# Patient Record
Sex: FEMALE | Race: ASIAN | NOT HISPANIC OR LATINO
[De-identification: names, ages, dates, MRNs, and addresses within clinical notes are randomized per-mention and may not be internally consistent; named-entity substitution may affect disease eponyms.]

---

## 2020-01-03 ENCOUNTER — LABORATORY RESULT (OUTPATIENT)
Age: 65
End: 2020-01-03

## 2020-01-03 ENCOUNTER — APPOINTMENT (OUTPATIENT)
Dept: INTERNAL MEDICINE | Facility: CLINIC | Age: 65
End: 2020-01-03
Payer: MEDICAID

## 2020-01-03 VITALS
DIASTOLIC BLOOD PRESSURE: 60 MMHG | TEMPERATURE: 97.5 F | SYSTOLIC BLOOD PRESSURE: 110 MMHG | WEIGHT: 119 LBS | HEIGHT: 57 IN | BODY MASS INDEX: 25.67 KG/M2

## 2020-01-03 DIAGNOSIS — Z82.49 FAMILY HISTORY OF ISCHEMIC HEART DISEASE AND OTHER DISEASES OF THE CIRCULATORY SYSTEM: ICD-10-CM

## 2020-01-03 DIAGNOSIS — Z83.3 FAMILY HISTORY OF DIABETES MELLITUS: ICD-10-CM

## 2020-01-03 DIAGNOSIS — Z78.9 OTHER SPECIFIED HEALTH STATUS: ICD-10-CM

## 2020-01-03 PROCEDURE — 93000 ELECTROCARDIOGRAM COMPLETE: CPT

## 2020-01-03 PROCEDURE — 36415 COLL VENOUS BLD VENIPUNCTURE: CPT

## 2020-01-03 PROCEDURE — 99386 PREV VISIT NEW AGE 40-64: CPT | Mod: 25

## 2020-01-04 ENCOUNTER — RESULT REVIEW (OUTPATIENT)
Age: 65
End: 2020-01-04

## 2020-01-04 LAB
ALBUMIN SERPL ELPH-MCNC: 4.3 G/DL
ALP BLD-CCNC: 88 U/L
ALT SERPL-CCNC: 15 U/L
ANION GAP SERPL CALC-SCNC: 13 MMOL/L
APPEARANCE: CLEAR
AST SERPL-CCNC: 15 U/L
BASOPHILS # BLD AUTO: 0.03 K/UL
BASOPHILS NFR BLD AUTO: 0.7 %
BILIRUB SERPL-MCNC: 0.2 MG/DL
BILIRUBIN URINE: NEGATIVE
BLOOD URINE: NEGATIVE
BUN SERPL-MCNC: 10 MG/DL
CALCIUM SERPL-MCNC: 10.8 MG/DL
CHLORIDE SERPL-SCNC: 101 MMOL/L
CHOLEST SERPL-MCNC: 160 MG/DL
CHOLEST/HDLC SERPL: 4 RATIO
CO2 SERPL-SCNC: 25 MMOL/L
COLOR: YELLOW
CREAT SERPL-MCNC: 0.58 MG/DL
EOSINOPHIL # BLD AUTO: 0.09 K/UL
EOSINOPHIL NFR BLD AUTO: 2 %
ESTIMATED AVERAGE GLUCOSE: 263 MG/DL
GLUCOSE QUALITATIVE U: NEGATIVE
GLUCOSE SERPL-MCNC: 209 MG/DL
HBA1C MFR BLD HPLC: 10.8 %
HCT VFR BLD CALC: 43.6 %
HDLC SERPL-MCNC: 40 MG/DL
HGB BLD-MCNC: 13.6 G/DL
IMM GRANULOCYTES NFR BLD AUTO: 0.2 %
KETONES URINE: NEGATIVE
LDLC SERPL CALC-MCNC: 104 MG/DL
LEUKOCYTE ESTERASE URINE: ABNORMAL
LYMPHOCYTES # BLD AUTO: 1.63 K/UL
LYMPHOCYTES NFR BLD AUTO: 35.4 %
MAN DIFF?: NORMAL
MCHC RBC-ENTMCNC: 26.6 PG
MCHC RBC-ENTMCNC: 31.2 GM/DL
MCV RBC AUTO: 85.3 FL
MONOCYTES # BLD AUTO: 0.37 K/UL
MONOCYTES NFR BLD AUTO: 8 %
NEUTROPHILS # BLD AUTO: 2.48 K/UL
NEUTROPHILS NFR BLD AUTO: 53.7 %
NITRITE URINE: NEGATIVE
PH URINE: 6
PLATELET # BLD AUTO: 282 K/UL
POTASSIUM SERPL-SCNC: 4.2 MMOL/L
PROT SERPL-MCNC: 6.9 G/DL
PROTEIN URINE: NEGATIVE
RBC # BLD: 5.11 M/UL
RBC # FLD: 12.3 %
SAVE SPECIMEN: NORMAL
SODIUM SERPL-SCNC: 139 MMOL/L
SPECIFIC GRAVITY URINE: 1.02
TRIGL SERPL-MCNC: 80 MG/DL
TROPONIN I SERPL-MCNC: <0.01 NG/ML
TSH SERPL-ACNC: 1.45 UIU/ML
UROBILINOGEN URINE: NORMAL
WBC # FLD AUTO: 4.61 K/UL

## 2020-01-06 LAB
CALCIUM SERPL-MCNC: 10.6 MG/DL
PARATHYROID HORMONE INTACT: 60 PG/ML

## 2020-01-10 ENCOUNTER — APPOINTMENT (OUTPATIENT)
Dept: INTERNAL MEDICINE | Facility: CLINIC | Age: 65
End: 2020-01-10
Payer: MEDICAID

## 2020-01-10 ENCOUNTER — INPATIENT (INPATIENT)
Facility: HOSPITAL | Age: 65
LOS: 2 days | Discharge: HOME | DRG: 311 | End: 2020-01-13
Attending: HOSPITALIST | Admitting: STUDENT IN AN ORGANIZED HEALTH CARE EDUCATION/TRAINING PROGRAM
Payer: MEDICAID

## 2020-01-10 VITALS
WEIGHT: 121 LBS | TEMPERATURE: 97 F | HEIGHT: 57 IN | SYSTOLIC BLOOD PRESSURE: 118 MMHG | HEART RATE: 60 BPM | DIASTOLIC BLOOD PRESSURE: 70 MMHG | BODY MASS INDEX: 26.1 KG/M2 | OXYGEN SATURATION: 98 %

## 2020-01-10 VITALS
SYSTOLIC BLOOD PRESSURE: 163 MMHG | DIASTOLIC BLOOD PRESSURE: 87 MMHG | WEIGHT: 134.92 LBS | TEMPERATURE: 98 F | RESPIRATION RATE: 20 BRPM | OXYGEN SATURATION: 98 % | HEART RATE: 67 BPM

## 2020-01-10 DIAGNOSIS — R87.619 UNSPECIFIED ABNORMAL CYTOLOGICAL FINDINGS IN SPECIMENS FROM CERVIX UTERI: ICD-10-CM

## 2020-01-10 DIAGNOSIS — I20.9 ANGINA PECTORIS, UNSPECIFIED: ICD-10-CM

## 2020-01-10 LAB
ALBUMIN SERPL ELPH-MCNC: 4.4 G/DL — SIGNIFICANT CHANGE UP (ref 3.3–5)
ALP SERPL-CCNC: 97 U/L — SIGNIFICANT CHANGE UP (ref 40–120)
ALT FLD-CCNC: 19 U/L — SIGNIFICANT CHANGE UP (ref 10–45)
ANION GAP SERPL CALC-SCNC: 13 MMOL/L — SIGNIFICANT CHANGE UP (ref 5–17)
APTT BLD: 27.5 SEC — SIGNIFICANT CHANGE UP (ref 27.5–36.3)
AST SERPL-CCNC: 14 U/L — SIGNIFICANT CHANGE UP (ref 10–40)
BASE EXCESS BLDV CALC-SCNC: 3 MMOL/L — HIGH (ref -2–2)
BASOPHILS # BLD AUTO: 0.03 K/UL — SIGNIFICANT CHANGE UP (ref 0–0.2)
BASOPHILS NFR BLD AUTO: 0.4 % — SIGNIFICANT CHANGE UP (ref 0–2)
BILIRUB SERPL-MCNC: 0.2 MG/DL — SIGNIFICANT CHANGE UP (ref 0.2–1.2)
BUN SERPL-MCNC: 12 MG/DL — SIGNIFICANT CHANGE UP (ref 7–23)
CA-I SERPL-SCNC: 1.31 MMOL/L — HIGH (ref 1.12–1.3)
CALCIUM SERPL-MCNC: 10.6 MG/DL — HIGH (ref 8.4–10.5)
CHLORIDE BLDV-SCNC: 103 MMOL/L — SIGNIFICANT CHANGE UP (ref 96–108)
CHLORIDE SERPL-SCNC: 97 MMOL/L — SIGNIFICANT CHANGE UP (ref 96–108)
CO2 BLDV-SCNC: 30 MMOL/L — SIGNIFICANT CHANGE UP (ref 22–30)
CO2 SERPL-SCNC: 25 MMOL/L — SIGNIFICANT CHANGE UP (ref 22–31)
CREAT SERPL-MCNC: 0.58 MG/DL — SIGNIFICANT CHANGE UP (ref 0.5–1.3)
EOSINOPHIL # BLD AUTO: 0.08 K/UL — SIGNIFICANT CHANGE UP (ref 0–0.5)
EOSINOPHIL NFR BLD AUTO: 1.1 % — SIGNIFICANT CHANGE UP (ref 0–6)
GAS PNL BLDV: 133 MMOL/L — LOW (ref 135–145)
GAS PNL BLDV: SIGNIFICANT CHANGE UP
GLUCOSE BLDV-MCNC: 267 MG/DL — HIGH (ref 70–99)
GLUCOSE SERPL-MCNC: 278 MG/DL — HIGH (ref 70–99)
HCO3 BLDV-SCNC: 28 MMOL/L — SIGNIFICANT CHANGE UP (ref 21–29)
HCT VFR BLD CALC: 42.7 % — SIGNIFICANT CHANGE UP (ref 34.5–45)
HCT VFR BLDA CALC: 43 % — SIGNIFICANT CHANGE UP (ref 39–50)
HGB BLD CALC-MCNC: 14.1 G/DL — SIGNIFICANT CHANGE UP (ref 11.5–15.5)
HGB BLD-MCNC: 13.7 G/DL — SIGNIFICANT CHANGE UP (ref 11.5–15.5)
IMM GRANULOCYTES NFR BLD AUTO: 0.3 % — SIGNIFICANT CHANGE UP (ref 0–1.5)
INR BLD: 0.89 RATIO — SIGNIFICANT CHANGE UP (ref 0.88–1.16)
LACTATE BLDV-MCNC: 1.7 MMOL/L — SIGNIFICANT CHANGE UP (ref 0.7–2)
LYMPHOCYTES # BLD AUTO: 2.63 K/UL — SIGNIFICANT CHANGE UP (ref 1–3.3)
LYMPHOCYTES # BLD AUTO: 34.9 % — SIGNIFICANT CHANGE UP (ref 13–44)
MAGNESIUM SERPL-MCNC: 2.2 MG/DL — SIGNIFICANT CHANGE UP (ref 1.6–2.6)
MCHC RBC-ENTMCNC: 26.8 PG — LOW (ref 27–34)
MCHC RBC-ENTMCNC: 32.1 GM/DL — SIGNIFICANT CHANGE UP (ref 32–36)
MCV RBC AUTO: 83.6 FL — SIGNIFICANT CHANGE UP (ref 80–100)
MONOCYTES # BLD AUTO: 0.49 K/UL — SIGNIFICANT CHANGE UP (ref 0–0.9)
MONOCYTES NFR BLD AUTO: 6.5 % — SIGNIFICANT CHANGE UP (ref 2–14)
NEUTROPHILS # BLD AUTO: 4.29 K/UL — SIGNIFICANT CHANGE UP (ref 1.8–7.4)
NEUTROPHILS NFR BLD AUTO: 56.8 % — SIGNIFICANT CHANGE UP (ref 43–77)
NRBC # BLD: 0 /100 WBCS — SIGNIFICANT CHANGE UP (ref 0–0)
NT-PROBNP SERPL-SCNC: 17 PG/ML — SIGNIFICANT CHANGE UP (ref 0–300)
PCO2 BLDV: 48 MMHG — SIGNIFICANT CHANGE UP (ref 35–50)
PH BLDV: 7.38 — SIGNIFICANT CHANGE UP (ref 7.35–7.45)
PLATELET # BLD AUTO: 304 K/UL — SIGNIFICANT CHANGE UP (ref 150–400)
PO2 BLDV: 29 MMHG — SIGNIFICANT CHANGE UP (ref 25–45)
POTASSIUM BLDV-SCNC: 3.6 MMOL/L — SIGNIFICANT CHANGE UP (ref 3.5–5.3)
POTASSIUM SERPL-MCNC: 3.9 MMOL/L — SIGNIFICANT CHANGE UP (ref 3.5–5.3)
POTASSIUM SERPL-SCNC: 3.9 MMOL/L — SIGNIFICANT CHANGE UP (ref 3.5–5.3)
PROT SERPL-MCNC: 7.2 G/DL — SIGNIFICANT CHANGE UP (ref 6–8.3)
PROTHROM AB SERPL-ACNC: 10.2 SEC — SIGNIFICANT CHANGE UP (ref 10–12.9)
RBC # BLD: 5.11 M/UL — SIGNIFICANT CHANGE UP (ref 3.8–5.2)
RBC # FLD: 12.3 % — SIGNIFICANT CHANGE UP (ref 10.3–14.5)
SAO2 % BLDV: 50 % — LOW (ref 67–88)
SODIUM SERPL-SCNC: 135 MMOL/L — SIGNIFICANT CHANGE UP (ref 135–145)
TROPONIN T, HIGH SENSITIVITY RESULT: <6 NG/L — SIGNIFICANT CHANGE UP (ref 0–51)
WBC # BLD: 7.54 K/UL — SIGNIFICANT CHANGE UP (ref 3.8–10.5)
WBC # FLD AUTO: 7.54 K/UL — SIGNIFICANT CHANGE UP (ref 3.8–10.5)

## 2020-01-10 PROCEDURE — 99285 EMERGENCY DEPT VISIT HI MDM: CPT

## 2020-01-10 PROCEDURE — 93010 ELECTROCARDIOGRAM REPORT: CPT

## 2020-01-10 PROCEDURE — 99214 OFFICE O/P EST MOD 30 MIN: CPT | Mod: 25

## 2020-01-10 PROCEDURE — 99223 1ST HOSP IP/OBS HIGH 75: CPT

## 2020-01-10 PROCEDURE — 71046 X-RAY EXAM CHEST 2 VIEWS: CPT | Mod: 26

## 2020-01-10 RX ORDER — SODIUM CHLORIDE 9 MG/ML
1000 INJECTION INTRAMUSCULAR; INTRAVENOUS; SUBCUTANEOUS ONCE
Refills: 0 | Status: COMPLETED | OUTPATIENT
Start: 2020-01-10 | End: 2020-01-10

## 2020-01-10 RX ORDER — ASPIRIN/CALCIUM CARB/MAGNESIUM 324 MG
243 TABLET ORAL ONCE
Refills: 0 | Status: COMPLETED | OUTPATIENT
Start: 2020-01-10 | End: 2020-01-10

## 2020-01-10 RX ADMIN — Medication 243 MILLIGRAM(S): at 22:52

## 2020-01-10 RX ADMIN — SODIUM CHLORIDE 1000 MILLILITER(S): 9 INJECTION INTRAMUSCULAR; INTRAVENOUS; SUBCUTANEOUS at 22:40

## 2020-01-10 NOTE — H&P ADULT - PROBLEM SELECTOR PLAN 2
check A1c  home regimen insulin 70/30 40u in AM (last received this morning)  will start patient on 15u lantus and 5u TIDAC for now   needs endocrinology appointment

## 2020-01-10 NOTE — H&P ADULT - ATTENDING COMMENTS
Patient assigned to me by night hospitalist in charge for management and care for patient for this evening only. Care to be resumed by day hospitalist in the morning and thereafter.     Patient's care rendered on 1/10/202 at 11:40PM.      I was physically present for the key portions of the evaluation and management (E/M) service provided.  I agree with the above history, physical, and plan which I have reviewed and edited where appropriate.     Plan discussed with Patient, RN, daughter.

## 2020-01-10 NOTE — ED ADULT NURSE NOTE - OBJECTIVE STATEMENT
65 y/o female PMH diabetes and HTN presents to ED reporting SOB and CP. Per family member pt has had SOB and CP for the last few days. On exam, AOx3, speaking in complete sentences. Lung sounds CTA, NAD, O2 sat 98%. Pt denies n/v/d, fever/chills at this time. CM in place sinus bradycardia HR 57. EKG completed and given to attending MD. Heplock placed, labs sent. Awaiting evaluation by MD.

## 2020-01-10 NOTE — H&P ADULT - NSHPREVIEWOFSYSTEMS_GEN_ALL_CORE
CONSTITUTIONAL: No weakness, fevers or chills  EYES/ENT: No visual changes;  No dysphagia  NECK: No pain or stiffness  RESPIRATORY: No cough, wheezing, hemoptysis; dyspnea on exertion   CARDIOVASCULAR: No chest pain or palpitations; No lower extremity edema  EXTREMITIES: no le edema, cyanosis, clubbing  MUSCULOSKELETAL: right knee pain (hx of arthritis)  GASTROINTESTINAL: No abdominal or epigastric pain. No nausea, vomiting, or hematemesis; No diarrhea or constipation. No melena or hematochezia.  BACK: No back pain  GENITOURINARY: No dysuria, frequency or hematuria  NEUROLOGICAL: No numbness or weakness  SKIN: No itching, burning, rashes, or lesions   PSYCH: no agitation  All other review of systems is negative unless indicated above.

## 2020-01-10 NOTE — ED PROVIDER NOTE - NS ED ROS FT
Gen: No fever, normal appetite  Eyes: No eye irritation or discharge  ENT: No earpain, congestion, sore throat  Resp: +SOB  Cardiovascular: +CP  Gastroenteric: No nausea/vomiting, diarrhea, constipation  : No dysuria  MS: No joint or muscle pain  Skin: No rashes  Neuro: No headache  Remainder negative, except as per the HPI

## 2020-01-10 NOTE — H&P ADULT - NSICDXPASTMEDICALHX_GEN_ALL_CORE_FT
PAST MEDICAL HISTORY:  Arthritis     CAD (coronary artery disease)     Cataract     Diabetes mellitus     HLD (hyperlipidemia)     HTN (hypertension)

## 2020-01-10 NOTE — ED PROVIDER NOTE - OBJECTIVE STATEMENT
65yo F h/o uncontrolled DM, HTN, HLD p/w chest pressure x 2 wks, worse with exertion. also complaining of sob worse with exertion x 2 wks. aspirin helps with the pain. denies f/c URI symptoms. reports a heart problem, but does not know exactly what the heart problem is. travel from [facility] last year. does not take any hormonal therapy. no calf swelling, no family history or h/o dvt.

## 2020-01-10 NOTE — ED ADULT NURSE NOTE - NSIMPLEMENTINTERV_GEN_ALL_ED
Implemented All Universal Safety Interventions:  [place] to call system. Call bell, personal items and telephone within reach. Instruct patient to call for assistance. Room bathroom lighting operational. Non-slip footwear when patient is off stretcher. Physically safe environment: no spills, clutter or unnecessary equipment. Stretcher in lowest position, wheels locked, appropriate side rails in place.

## 2020-01-10 NOTE — ED PROVIDER NOTE - RAPID ASSESSMENT
pt seen as Rapid Access in waiting room prior to ED team evaluation -- pt w/ daughter (easily translating) c/o 2 weeks of gradually increasing SOB, dyspnea on exertion, fatigue, poorly controlled DM with increased thirst/urination, feels intermittent off balance/vertigo at times, no current chest pain/discomfort or sob/dyspnea, no recent fevers.  [place] Medications -->  Mixtard 30 mg, Nebicard 2.5 mg, Ecosprin 75mg, Prosan HZ 50 mg, Avaton 10 mg   - Jasmeet Arboleda MD

## 2020-01-10 NOTE — H&P ADULT - NSHPPHYSICALEXAM_GEN_ALL_CORE
PHYSICAL EXAM:  Vital Signs Last 24 Hrs  T(C): 36.7 (01-11-20 @ 01:40)  T(F): 98.1 (01-11-20 @ 01:40), Max: 98.2 (01-10-20 @ 19:34)  HR: 58 (01-11-20 @ 01:40) (57 - 67)  BP: 103/56 (01-11-20 @ 01:40)  BP(mean): 67 (01-11-20 @ 01:40) (67 - 67)  RR: 16 (01-11-20 @ 01:40) (16 - 20)  SpO2: 100% (01-11-20 @ 01:40) (98% - 100%)  Wt(kg): --    Constitutional: NAD, awake and alert  EYES: EOMI  ENT:  Normal Hearing, no tonsillar exudates   Neck: Soft and supple, No JVD  Lungs: Breath sounds are clear bilaterally, No wheezing, rales or rhonchi  Heart: S1 and S2, regular rate and rhythm, no Murmurs, gallops or rubs  Abdomen: Bowel Sounds present, soft, nontender, nondistended, no guarding, no rebound  Extremities: No cyanosis or clubbing; warm to touch  Vascular: 2+ peripheral pulses lower ex  Neurological: A/O x 3, no focal deficits  Musculoskeletal: 5/5 strength b/l upper and lower extremities  Skin: No rashes  Psych: no depression or anhedonia  HEME: no bruises, no nose bleeds

## 2020-01-10 NOTE — ED PROVIDER NOTE - PROGRESS NOTE DETAILS
Irasema Boston MD: pt with s/s consistent with angina. HEART 5. will admit for further w/u. cardiology consulted Irasema Boston MD: pt with s/s consistent with angina. HEART 5. will admit for further w/u. hosp requesting cards c/s Irasema Boston MD: attempted to contact unattached cardiologist Dr Awan - did not answer and mailbox full

## 2020-01-10 NOTE — H&P ADULT - PROBLEM SELECTOR PLAN 1
Patient with dyspnea on exertion likely 2/2 CHF vs. valvular disease, no fever, no leukocytosis, no cough to suggest pneumonia; no recent travel, unlikely to be PE  never had chest pain, unlikely to be ACS; hstrop <6  check echocardiogram  currently does not appear to be fluid overloaded  monitor on telemetry

## 2020-01-10 NOTE — ED PROVIDER NOTE - CLINICAL SUMMARY MEDICAL DECISION MAKING FREE TEXT BOX
Estefany: high risk diabetic with previous heart problems sent for chest pain and elevated glucose, no primary care doctor in this country.  Will check labs and could need admission due to high risk. will get labs and discuss.

## 2020-01-10 NOTE — H&P ADULT - HISTORY OF PRESENT ILLNESS
63 yo female with PMH of HTN, HLD, ?CAD, DM, cataract, arthritis, presents here with SOB.  Patient states that for last few months she has been having SOB with exertion, which is progressively getting worse.  She now gets SOB with walking up one flight of stairs, though she can walk on level ground without any issues.  She also has trouble lying flat due to SOB, but denies PND, LE swelling or weight gain.  She denies any chest pain or palpitation.  Denies any dizziness or lightheadedness.  [place] came to [place] ago from [facility].  She never had any chest pain in past, never had stress test or angiogram done.  She had echo in the past and was told everything was OK.  She came from [facility] one year ago and has not been able to see any doctor, also has been taking medication that she brought from her country,. 63 yo female with PMH of HTN, HLD, ?CAD, DM, cataract, arthritis, presents here with SOB.  Patient states that for last few months she has been having SOB with exertion, which is progressively getting worse.  She now gets SOB with walking up one flight of stairs, though she can walk on level ground without any issues.  She also has trouble lying flat due to SOB, but denies PND, LE swelling or weight gain.  She denies any chest pain or palpitation.  Denies any dizziness or lightheadedness.  She came to [place] ago from [facility].  She never had any chest pain in past, never had stress test or angiogram done.  She had echo in the past and was told everything was OK.  She came from [facility] one year ago and has not been able to see any doctor, also has been taking medication that she brought from her country,.      Home medication:  Mixtard 30 (insulin 70/30) 40u qAM  Nebicard (Nebivolol) 2.5mg daily  Ecosprin (aspirin) 75mg daily  Prosan Hz (losartan/HCTZ) 50mg daily  Avator 10mg bedtime

## 2020-01-10 NOTE — H&P ADULT - PROBLEM SELECTOR PLAN 7
lovenox 1.  Name of PCP: No PMD  2.  PCP Contacted on Admission: [ ] Y    [ ] N    3.  PCP contacted at Discharge: [ ] Y    [ ] N    [ ] N/A  4.  Post-Discharge Appointment Date and Location:  5.  Summary of Handoff given to PCP:

## 2020-01-10 NOTE — H&P ADULT - NSHPLABSRESULTS_GEN_ALL_CORE
Labs personally reviewed:                          13.7   7.54  )-----------( 304      ( 10 Jason 2020 22:14 )             42.7     01-10    135  |  97  |  12  ----------------------------<  278<H>  3.9   |  25  |  0.58    Ca    10.6<H>      10 Jason 2020 22:14  Mg     2.2     10    TPro  7.2  /  Alb  4.4  /  TBili  0.2  /  DBili  x   /  AST  14  /  ALT  19  /  AlkPhos  97  01-10        LIVER FUNCTIONS - ( 10 Jason 2020 22:14 )  Alb: 4.4 g/dL / Pro: 7.2 g/dL / ALK PHOS: 97 U/L / ALT: 19 U/L / AST: 14 U/L / GGT: x           PT/INR - ( 10 Jason 2020 22:14 )   PT: 10.2 sec;   INR: 0.89 ratio         PTT - ( 10 Jason 2020 22:14 )  PTT:27.5 sec  Urinalysis Basic - ( 2020 00:30 )    Color: Colorless / Appearance: Clear / S.004 / pH: x  Gluc: x / Ketone: Negative  / Bili: Negative / Urobili: Negative   Blood: x / Protein: Negative / Nitrite: Negative   Leuk Esterase: Large / RBC: 0 /hpf / WBC 51 /HPF   Sq Epi: x / Non Sq Epi: 1 /hpf / Bacteria: Negative      CAPILLARY BLOOD GLUCOSE      POCT Blood Glucose.: 132 mg/dL (2020 00:38)  POCT Blood Glucose.: 325 mg/dL (10 Jason 2020 19:40)      Imaging:  CXR personally reviewed: no focal opacity    EKG personally reviewed: incomplete RBBB, TWI III, AVF v3-v6

## 2020-01-10 NOTE — ED PROVIDER NOTE - PHYSICAL EXAMINATION
Vitals: HTN remainder wnl  Gen: laying comfortably in NAD  Head: NCAT  ENT: sclerae white, anicterus, moist mucous membranes. No exudates.   CV: RRR. Audible S1 and S2. No murmurs, rubs, gallops, S3, nor S4, 2+ radial and DP pulses   Pulm: Clear to auscultation bilaterally. No wheezes, rales, or rhonchi  Abd: soft, normoactive BS x4, NTND, no rebound, no guarding, no rashes  Musculoskeletal:  No peripheral edema  Skin: no lesions or scars noted  Neurologic: AAOx3  : no CVA tenderness  Psych: normal affect

## 2020-01-10 NOTE — ED ADULT NURSE REASSESSMENT NOTE - NS ED NURSE REASSESS COMMENT FT1
Pt admitted to medicine, telemetry diagnosis of chest pain. CM in place NSR HR 60s. Awaiting bed placement at this time. Pt provided food per MD Boston.

## 2020-01-11 DIAGNOSIS — R06.02 SHORTNESS OF BREATH: ICD-10-CM

## 2020-01-11 DIAGNOSIS — Z29.9 ENCOUNTER FOR PROPHYLACTIC MEASURES, UNSPECIFIED: ICD-10-CM

## 2020-01-11 DIAGNOSIS — Z90.49 ACQUIRED ABSENCE OF OTHER SPECIFIED PARTS OF DIGESTIVE TRACT: Chronic | ICD-10-CM

## 2020-01-11 DIAGNOSIS — Z90.710 ACQUIRED ABSENCE OF BOTH CERVIX AND UTERUS: Chronic | ICD-10-CM

## 2020-01-11 DIAGNOSIS — I25.10 ATHEROSCLEROTIC HEART DISEASE OF NATIVE CORONARY ARTERY WITHOUT ANGINA PECTORIS: ICD-10-CM

## 2020-01-11 DIAGNOSIS — R00.2 PALPITATIONS: ICD-10-CM

## 2020-01-11 DIAGNOSIS — E11.65 TYPE 2 DIABETES MELLITUS WITH HYPERGLYCEMIA: ICD-10-CM

## 2020-01-11 DIAGNOSIS — E78.5 HYPERLIPIDEMIA, UNSPECIFIED: ICD-10-CM

## 2020-01-11 DIAGNOSIS — Z02.9 ENCOUNTER FOR ADMINISTRATIVE EXAMINATIONS, UNSPECIFIED: ICD-10-CM

## 2020-01-11 DIAGNOSIS — R82.90 UNSPECIFIED ABNORMAL FINDINGS IN URINE: ICD-10-CM

## 2020-01-11 DIAGNOSIS — I10 ESSENTIAL (PRIMARY) HYPERTENSION: ICD-10-CM

## 2020-01-11 DIAGNOSIS — E11.9 TYPE 2 DIABETES MELLITUS WITHOUT COMPLICATIONS: ICD-10-CM

## 2020-01-11 DIAGNOSIS — E78.00 PURE HYPERCHOLESTEROLEMIA, UNSPECIFIED: ICD-10-CM

## 2020-01-11 LAB
ALBUMIN SERPL ELPH-MCNC: 3.8 G/DL — SIGNIFICANT CHANGE UP (ref 3.3–5)
ALP SERPL-CCNC: 73 U/L — SIGNIFICANT CHANGE UP (ref 40–120)
ALT FLD-CCNC: 16 U/L — SIGNIFICANT CHANGE UP (ref 10–45)
ANION GAP SERPL CALC-SCNC: 11 MMOL/L — SIGNIFICANT CHANGE UP (ref 5–17)
APPEARANCE UR: CLEAR — SIGNIFICANT CHANGE UP
AST SERPL-CCNC: 12 U/L — SIGNIFICANT CHANGE UP (ref 10–40)
BACTERIA # UR AUTO: NEGATIVE — SIGNIFICANT CHANGE UP
BILIRUB SERPL-MCNC: 0.2 MG/DL — SIGNIFICANT CHANGE UP (ref 0.2–1.2)
BILIRUB UR-MCNC: NEGATIVE — SIGNIFICANT CHANGE UP
BUN SERPL-MCNC: 12 MG/DL — SIGNIFICANT CHANGE UP (ref 7–23)
CALCIUM SERPL-MCNC: 9.6 MG/DL — SIGNIFICANT CHANGE UP (ref 8.4–10.5)
CHLORIDE SERPL-SCNC: 106 MMOL/L — SIGNIFICANT CHANGE UP (ref 96–108)
CHOLEST SERPL-MCNC: 169 MG/DL — SIGNIFICANT CHANGE UP (ref 10–199)
CO2 SERPL-SCNC: 26 MMOL/L — SIGNIFICANT CHANGE UP (ref 22–31)
COLOR SPEC: COLORLESS — SIGNIFICANT CHANGE UP
CREAT SERPL-MCNC: 0.59 MG/DL — SIGNIFICANT CHANGE UP (ref 0.5–1.3)
DIFF PNL FLD: NEGATIVE — SIGNIFICANT CHANGE UP
EPI CELLS # UR: 1 /HPF — SIGNIFICANT CHANGE UP
GLUCOSE BLDC GLUCOMTR-MCNC: 129 MG/DL — HIGH (ref 70–99)
GLUCOSE BLDC GLUCOMTR-MCNC: 132 MG/DL — HIGH (ref 70–99)
GLUCOSE BLDC GLUCOMTR-MCNC: 195 MG/DL — HIGH (ref 70–99)
GLUCOSE BLDC GLUCOMTR-MCNC: 203 MG/DL — HIGH (ref 70–99)
GLUCOSE BLDC GLUCOMTR-MCNC: 219 MG/DL — HIGH (ref 70–99)
GLUCOSE BLDC GLUCOMTR-MCNC: 353 MG/DL — HIGH (ref 70–99)
GLUCOSE BLDC GLUCOMTR-MCNC: 416 MG/DL — HIGH (ref 70–99)
GLUCOSE SERPL-MCNC: 142 MG/DL — HIGH (ref 70–99)
GLUCOSE UR QL: ABNORMAL
HBA1C BLD-MCNC: 11 % — HIGH (ref 4–5.6)
HCV AB S/CO SERPL IA: 0.07 S/CO — SIGNIFICANT CHANGE UP (ref 0–0.99)
HCV AB SERPL-IMP: SIGNIFICANT CHANGE UP
HDLC SERPL-MCNC: 42 MG/DL — LOW
HYALINE CASTS # UR AUTO: 2 /LPF — SIGNIFICANT CHANGE UP (ref 0–2)
KETONES UR-MCNC: NEGATIVE — SIGNIFICANT CHANGE UP
LEUKOCYTE ESTERASE UR-ACNC: ABNORMAL
LIPID PNL WITH DIRECT LDL SERPL: 113 MG/DL — HIGH
MAGNESIUM SERPL-MCNC: 2.1 MG/DL — SIGNIFICANT CHANGE UP (ref 1.6–2.6)
NITRITE UR-MCNC: NEGATIVE — SIGNIFICANT CHANGE UP
PH UR: 6 — SIGNIFICANT CHANGE UP (ref 5–8)
PHOSPHATE SERPL-MCNC: 3.6 MG/DL — SIGNIFICANT CHANGE UP (ref 2.5–4.5)
POTASSIUM SERPL-MCNC: 4.3 MMOL/L — SIGNIFICANT CHANGE UP (ref 3.5–5.3)
POTASSIUM SERPL-SCNC: 4.3 MMOL/L — SIGNIFICANT CHANGE UP (ref 3.5–5.3)
PROT SERPL-MCNC: 6.4 G/DL — SIGNIFICANT CHANGE UP (ref 6–8.3)
PROT UR-MCNC: NEGATIVE — SIGNIFICANT CHANGE UP
RBC CASTS # UR COMP ASSIST: 0 /HPF — SIGNIFICANT CHANGE UP (ref 0–4)
SODIUM SERPL-SCNC: 143 MMOL/L — SIGNIFICANT CHANGE UP (ref 135–145)
SP GR SPEC: 1 — LOW (ref 1.01–1.02)
TOTAL CHOLESTEROL/HDL RATIO MEASUREMENT: 4 RATIO — SIGNIFICANT CHANGE UP (ref 3.3–7.1)
TRIGL SERPL-MCNC: 71 MG/DL — SIGNIFICANT CHANGE UP (ref 10–149)
TSH SERPL-MCNC: 1.83 UIU/ML — SIGNIFICANT CHANGE UP (ref 0.27–4.2)
TSH SERPL-MCNC: 2.28 UIU/ML — SIGNIFICANT CHANGE UP (ref 0.27–4.2)
UROBILINOGEN FLD QL: NEGATIVE — SIGNIFICANT CHANGE UP
WBC UR QL: 51 /HPF — HIGH (ref 0–5)

## 2020-01-11 PROCEDURE — 99223 1ST HOSP IP/OBS HIGH 75: CPT

## 2020-01-11 PROCEDURE — 99233 SBSQ HOSP IP/OBS HIGH 50: CPT

## 2020-01-11 RX ORDER — INFLUENZA VIRUS VACCINE 15; 15; 15; 15 UG/.5ML; UG/.5ML; UG/.5ML; UG/.5ML
0.5 SUSPENSION INTRAMUSCULAR ONCE
Refills: 0 | Status: DISCONTINUED | OUTPATIENT
Start: 2020-01-11 | End: 2020-01-13

## 2020-01-11 RX ORDER — INSULIN LISPRO 100/ML
VIAL (ML) SUBCUTANEOUS AT BEDTIME
Refills: 0 | Status: DISCONTINUED | OUTPATIENT
Start: 2020-01-11 | End: 2020-01-12

## 2020-01-11 RX ORDER — DEXTROSE 50 % IN WATER 50 %
15 SYRINGE (ML) INTRAVENOUS ONCE
Refills: 0 | Status: DISCONTINUED | OUTPATIENT
Start: 2020-01-11 | End: 2020-01-13

## 2020-01-11 RX ORDER — GLUCAGON INJECTION, SOLUTION 0.5 MG/.1ML
1 INJECTION, SOLUTION SUBCUTANEOUS ONCE
Refills: 0 | Status: DISCONTINUED | OUTPATIENT
Start: 2020-01-11 | End: 2020-01-13

## 2020-01-11 RX ORDER — ATORVASTATIN CALCIUM 80 MG/1
20 TABLET, FILM COATED ORAL AT BEDTIME
Refills: 0 | Status: DISCONTINUED | OUTPATIENT
Start: 2020-01-11 | End: 2020-01-13

## 2020-01-11 RX ORDER — ASPIRIN/CALCIUM CARB/MAGNESIUM 324 MG
81 TABLET ORAL DAILY
Refills: 0 | Status: DISCONTINUED | OUTPATIENT
Start: 2020-01-11 | End: 2020-01-13

## 2020-01-11 RX ORDER — ENOXAPARIN SODIUM 100 MG/ML
40 INJECTION SUBCUTANEOUS DAILY
Refills: 0 | Status: DISCONTINUED | OUTPATIENT
Start: 2020-01-11 | End: 2020-01-13

## 2020-01-11 RX ORDER — INSULIN LISPRO 100/ML
VIAL (ML) SUBCUTANEOUS
Refills: 0 | Status: DISCONTINUED | OUTPATIENT
Start: 2020-01-11 | End: 2020-01-12

## 2020-01-11 RX ORDER — SODIUM CHLORIDE 9 MG/ML
1000 INJECTION, SOLUTION INTRAVENOUS
Refills: 0 | Status: DISCONTINUED | OUTPATIENT
Start: 2020-01-11 | End: 2020-01-13

## 2020-01-11 RX ORDER — INSULIN GLARGINE 100 [IU]/ML
15 INJECTION, SOLUTION SUBCUTANEOUS EVERY MORNING
Refills: 0 | Status: DISCONTINUED | OUTPATIENT
Start: 2020-01-11 | End: 2020-01-12

## 2020-01-11 RX ORDER — LOSARTAN POTASSIUM 100 MG/1
50 TABLET, FILM COATED ORAL DAILY
Refills: 0 | Status: DISCONTINUED | OUTPATIENT
Start: 2020-01-11 | End: 2020-01-13

## 2020-01-11 RX ORDER — DEXTROSE 50 % IN WATER 50 %
25 SYRINGE (ML) INTRAVENOUS ONCE
Refills: 0 | Status: DISCONTINUED | OUTPATIENT
Start: 2020-01-11 | End: 2020-01-13

## 2020-01-11 RX ORDER — INSULIN LISPRO 100/ML
5 VIAL (ML) SUBCUTANEOUS
Refills: 0 | Status: DISCONTINUED | OUTPATIENT
Start: 2020-01-11 | End: 2020-01-12

## 2020-01-11 RX ORDER — DEXTROSE 50 % IN WATER 50 %
12.5 SYRINGE (ML) INTRAVENOUS ONCE
Refills: 0 | Status: DISCONTINUED | OUTPATIENT
Start: 2020-01-11 | End: 2020-01-13

## 2020-01-11 RX ORDER — ATORVASTATIN CALCIUM 80 MG/1
10 TABLET, FILM COATED ORAL AT BEDTIME
Refills: 0 | Status: DISCONTINUED | OUTPATIENT
Start: 2020-01-11 | End: 2020-01-11

## 2020-01-11 RX ADMIN — ATORVASTATIN CALCIUM 20 MILLIGRAM(S): 80 TABLET, FILM COATED ORAL at 20:40

## 2020-01-11 RX ADMIN — Medication 3: at 22:17

## 2020-01-11 RX ADMIN — LOSARTAN POTASSIUM 50 MILLIGRAM(S): 100 TABLET, FILM COATED ORAL at 05:09

## 2020-01-11 RX ADMIN — Medication 5 UNIT(S): at 12:50

## 2020-01-11 RX ADMIN — Medication 81 MILLIGRAM(S): at 11:26

## 2020-01-11 RX ADMIN — Medication 5 UNIT(S): at 09:55

## 2020-01-11 RX ADMIN — Medication 1: at 17:57

## 2020-01-11 RX ADMIN — Medication 5 UNIT(S): at 17:57

## 2020-01-11 RX ADMIN — INSULIN GLARGINE 15 UNIT(S): 100 INJECTION, SOLUTION SUBCUTANEOUS at 09:55

## 2020-01-11 RX ADMIN — Medication 2: at 12:50

## 2020-01-11 NOTE — PROGRESS NOTE ADULT - SUBJECTIVE AND OBJECTIVE BOX
Patient is a 64y old  Female who presents with a chief complaint of shortness of breath (2020 10:44)      INTERVAL History of Present Illness/OVERNIGHT EVENTS: [place] no. [id number]  asymptomatic currently  report of intermittent palpitations ?exertional  not a good historian  does not have PCP or known cardiologist  reports uncontrolled DM and HLD    MEDICATIONS  (STANDING):  aspirin  chewable 81 milliGRAM(s) Oral daily  atorvastatin 10 milliGRAM(s) Oral at bedtime  dextrose 5%. 1000 milliLiter(s) (50 mL/Hr) IV Continuous <Continuous>  dextrose 50% Injectable 12.5 Gram(s) IV Push once  dextrose 50% Injectable 25 Gram(s) IV Push once  dextrose 50% Injectable 25 Gram(s) IV Push once  enoxaparin Injectable 40 milliGRAM(s) SubCutaneous daily  hydrochlorothiazide 12.5 milliGRAM(s) Oral daily  insulin glargine Injectable (LANTUS) 15 Unit(s) SubCutaneous every morning  insulin lispro (HumaLOG) corrective regimen sliding scale   SubCutaneous three times a day before meals  insulin lispro (HumaLOG) corrective regimen sliding scale   SubCutaneous at bedtime  insulin lispro Injectable (HumaLOG) 5 Unit(s) SubCutaneous three times a day before meals  losartan 50 milliGRAM(s) Oral daily    MEDICATIONS  (PRN):  dextrose 40% Gel 15 Gram(s) Oral once PRN Blood Glucose LESS THAN 70 milliGRAM(s)/deciliter  glucagon  Injectable 1 milliGRAM(s) IntraMuscular once PRN Glucose LESS THAN 70 milligrams/deciliter      Allergies    No Known Allergies    Intolerances        REVIEW OF SYSTEMS:  Negative unless otherwise specified above.    Vital Signs Last 24 Hrs  T(C): 36.6 (2020 07:19), Max: 36.8 (10 Jason 2020 19:34)  T(F): 97.8 (2020 07:19), Max: 98.2 (10 Jason 2020 19:34)  HR: 61 (2020 07:19) (57 - 67)  BP: 113/74 (2020 07:19) (103/56 - 163/87)  BP(mean): 67 (2020 01:40) (67 - 67)  RR: 18 (2020 07:19) (16 - 20)  SpO2: 97% (2020 07:19) (97% - 100%)      Weight (kg): 61.2 (01-10 @ 19:34)    PHYSICAL EXAM:  GENERAL: No apparent distress, appears stated age  HEAD:  Atraumatic, Normocephalic  EYES: Conjunctiva and sclera clear, no discharge  ENMT: Moist mucous membranes, no nasal discharge  NECK: Supple, no JVD  CHEST/LUNG: Clear to auscultation bilaterally, no wheeze or rales  HEART: Regular rate and rhythm, no murmurs, rubs or gallops  ABDOMEN: Soft, Nontender, Nondistended; Bowel sounds present  EXTREMITIES:  No clubbing, cyanosis or edema  SKIN: No rash or new discoloration  NERVOUS SYSTEM:  Alert & Oriented; Bilateral Lower extremity mobile, sensation to light touch intact      LABS:                        13.7   7.54  )-----------( 304      ( 10 Jason 2020 22:14 )             42.7     2020 05:37    143    |  106    |  12     ----------------------------<  142    4.3     |  26     |  0.59     Ca    9.6        2020 05:37  Phos  3.6       2020 05:37  Mg     2.1       2020 05:37    TPro  6.4    /  Alb  3.8    /  TBili  0.2    /  DBili  x      /  AST  12     /  ALT  16     /  AlkPhos  73     2020 05:37    PT/INR - ( 10 Jason 2020 22:14 )   PT: 10.2 sec;   INR: 0.89 ratio         PTT - ( 10 Jason 2020 22:14 )  PTT:27.5 sec  Urinalysis Basic - ( 2020 00:30 )    Color: Colorless / Appearance: Clear / S.004 / pH: x  Gluc: x / Ketone: Negative  / Bili: Negative / Urobili: Negative   Blood: x / Protein: Negative / Nitrite: Negative   Leuk Esterase: Large / RBC: 0 /hpf / WBC 51 /HPF   Sq Epi: x / Non Sq Epi: 1 /hpf / Bacteria: Negative      CAPILLARY BLOOD GLUCOSE      POCT Blood Glucose.: 219 mg/dL (2020 12:29)  POCT Blood Glucose.: 203 mg/dL (2020 09:49)  POCT Blood Glucose.: 129 mg/dL (2020 08:39)  POCT Blood Glucose.: 132 mg/dL (2020 00:38)  POCT Blood Glucose.: 325 mg/dL (10 Jason 2020 19:40)      RADIOLOGY & ADDITIONAL TESTS:    Images reviewed personally    Consultant Notes Reviewed and Care Discussed with relevant Consultants/Other Providers.

## 2020-01-11 NOTE — CONSULT NOTE ADULT - SUBJECTIVE AND OBJECTIVE BOX
CHIEF COMPLAINT:  Palpitations     HISTORY OF PRESENT ILLNESS:  63 yo female with PMH of HTN, HLD, ?CAD, DM, cataract, arthritis, presents here with SOB.  Patient states that for last few months she has been having SOB with exertion, which is progressively getting worse.  She now gets SOB with walking up one flight of stairs, though she can walk on level ground without any issues.  She also has trouble lying flat due to SOB, but denies PND, LE swelling or weight gain.  She denies any chest pain or palpitation.  Denies any dizziness or lightheadedness.  She came to [place] ago from [facility].  She never had any chest pain in past, never had stress test or angiogram done.  She had echo in the past and was told everything was OK.  She came from [facility] one year ago and has not been able to see any doctor, also has been taking medication that she brought from her country,.          PAST MEDICAL & SURGICAL HISTORY:  Arthritis  Cataract  Diabetes mellitus  CAD (coronary artery disease)  HLD (hyperlipidemia)  HTN (hypertension)  S/P hysterectomy  S/P cholecystectomy          MEDICATIONS:  aspirin  chewable 81 milliGRAM(s) Oral daily  enoxaparin Injectable 40 milliGRAM(s) SubCutaneous daily  hydrochlorothiazide 12.5 milliGRAM(s) Oral daily  losartan 50 milliGRAM(s) Oral daily            atorvastatin 20 milliGRAM(s) Oral at bedtime  dextrose 40% Gel 15 Gram(s) Oral once PRN  dextrose 50% Injectable 12.5 Gram(s) IV Push once  dextrose 50% Injectable 25 Gram(s) IV Push once  dextrose 50% Injectable 25 Gram(s) IV Push once  glucagon  Injectable 1 milliGRAM(s) IntraMuscular once PRN  insulin glargine Injectable (LANTUS) 15 Unit(s) SubCutaneous every morning  insulin lispro (HumaLOG) corrective regimen sliding scale   SubCutaneous three times a day before meals  insulin lispro (HumaLOG) corrective regimen sliding scale   SubCutaneous at bedtime  insulin lispro Injectable (HumaLOG) 5 Unit(s) SubCutaneous three times a day before meals    dextrose 5%. 1000 milliLiter(s) IV Continuous <Continuous>  influenza   Vaccine 0.5 milliLiter(s) IntraMuscular once      FAMILY HISTORY:  FH: diabetes mellitus      SOCIAL HISTORY:    [ ] Non-smoker  [ ] Smoker  [ ] Alcohol    Allergies    No Known Allergies    Intolerances    	    REVIEW OF SYSTEMS:  CONSTITUTIONAL: No fever, weight loss, or fatigue  EYES: No eye pain, visual disturbances, or discharge  ENMT:  No difficulty hearing, tinnitus, vertigo; No sinus or throat pain  NECK: No pain or stiffness  RESPIRATORY: No cough, wheezing, chills or hemoptysis; No Shortness of Breath  CARDIOVASCULAR: No chest pain, palpitations, passing out, dizziness, or leg swelling  GASTROINTESTINAL: No abdominal or epigastric pain. No nausea, vomiting, or hematemesis; No diarrhea or constipation. No melena or hematochezia.  GENITOURINARY: No dysuria, frequency, hematuria, or incontinence  NEUROLOGICAL: No headaches, memory loss, loss of strength, numbness, or tremors  SKIN: No itching, burning, rashes, or lesions   LYMPH Nodes: No enlarged glands  ENDOCRINE: No heat or cold intolerance; No hair loss  MUSCULOSKELETAL: No joint pain or swelling; No muscle, back, or extremity pain  PSYCHIATRIC: No depression, anxiety, mood swings, or difficulty sleeping  HEME/LYMPH: No easy bruising, or bleeding gums  ALLERY AND IMMUNOLOGIC: No hives or eczema	    [ ] All others negative	  [ ] Unable to obtain    PHYSICAL EXAM:  T(C): 36.7 (01-11-20 @ 16:46), Max: 37 (01-11-20 @ 15:49)  HR: 64 (01-11-20 @ 16:46) (57 - 64)  BP: 146/77 (01-11-20 @ 16:46) (103/56 - 152/80)  RR: 16 (01-11-20 @ 16:46) (16 - 19)  SpO2: 97% (01-11-20 @ 16:46) (97% - 100%)  Wt(kg): --  I&O's Summary      Appearance: NAD  HEENT:   Normal oral mucosa, PERRL, EOMI	  Lymphatic: No lymphadenopathy  Cardiovascular: Normal S1 S2, No JVD, No murmurs, No edema  Respiratory: Lungs clear to auscultation	  Psychiatry: A & O x 3, Mood & affect appropriate  Gastrointestinal:  Soft, Non-tender, + BS	  Skin: No rashes, No ecchymoses, No cyanosis	  Neurologic: Non-focal  Extremities: Normal range of motion, No clubbing, cyanosis or edema  Vascular: Peripheral pulses palpable 2+ bilaterally    TELEMETRY: 	SR    ECG:  	  RADIOLOGY:  < from: Xray Chest 2 Views PA/Lat (01.10.20 @ 23:13) >    EXAM:  XR CHEST PA LAT 2V                            PROCEDURE DATE:  01/10/2020            INTERPRETATION:  CLINICAL INFORMATION: Shortness of breath.    EXAM: PA and lateral chest    COMPARISON: None    FINDINGS:    Clear lungs.   There is no pleural effusion or pneumothorax.   The heart is normal in size.    IMPRESSION:    Clear lungs.                JAY BENAVIDEZ M.D., RADIOLOGIST RESIDENT  This document has been electronically signed.  JENNI JOLLY M.D., ATTENDING RADIOLOGIST  This document has been electronically signed. Jan 11 2020  8:32AM                < end of copied text >    OTHER: 	  	  LABS:	 	    CARDIAC MARKERS:        Serum Pro-Brain Natriuretic Peptide (01.10.20 @ 22:14)    Serum Pro-Brain Natriuretic Peptide: 17 pg/mL      Troponin T, High Sensitivity (01.10.20 @ 22:14)    Troponin T, High Sensitivity Result: <6: Rapid upward or downward changes in high-sensitivity troponin levels  suggest acute myocardial injury. Renal impairment may cause sustained  troponin elevations.  Normal: <6 - 14 ng/L  Indeterminate: 15-51 ng/L  Elevated: > 51 ng/L  See http://labs/test/[email or web address] on the [facility] for more  information ng/L                            13.7   7.54  )-----------( 304      ( 10 Jason 2020 22:14 )             42.7     01-11    143  |  106  |  12  ----------------------------<  142<H>  4.3   |  26  |  0.59    Ca    9.6      11 Jan 2020 05:37  Phos  3.6     01-11  Mg     2.1     01-11    TPro  6.4  /  Alb  3.8  /  TBili  0.2  /  DBili  x   /  AST  12  /  ALT  16  /  AlkPhos  73  01-11    proBNP: Serum Pro-Brain Natriuretic Peptide: 17 pg/mL (01-10 @ 22:14)    Lipid Profile:   HgA1c: Hemoglobin A1C, Whole Blood: 11.0 % (01-11 @ 02:33)    TSH: Thyroid Stimulating Hormone, Serum: 2.28 uIU/mL (01-11 @ 10:52)  Thyroid Stimulating Hormone, Serum: 1.83 uIU/mL (01-11 @ 03:12)

## 2020-01-11 NOTE — CONSULT NOTE ADULT - SUBJECTIVE AND OBJECTIVE BOX
HPI:  65 yo female with PMH of HTN, HLD, ?CAD, DM, cataract, arthritis, presents here with SOB.  Patient states that for last few months she has been having SOB with exertion, which is progressively getting worse.  She now gets SOB with walking up one flight of stairs, though she can walk on level ground without any issues.  She also has trouble lying flat due to SOB, but denies PND, LE swelling or weight gain.  She denies any chest pain or palpitation.  Denies any dizziness or lightheadedness.  [place] came [place] ago from [facility].  She never had any chest pain in past, never had stress test or angiogram done.  She had echo in the past and was told everything was OK.  She came from [facility] one year ago and has not been able to see any doctor, also has been taking medication [place].     11.0%  >100    Home medication:  Mixtard 30 (insulin 70/30) 40u qAM  Nebicard (Nebivolol) 2.5mg daily  Ecosprin (aspirin) 75mg daily  Prosan Hz (losartan/HCTZ) 50mg daily  Avator 10mg bedtime (10 Jason 2020 23:59)      PAST MEDICAL & SURGICAL HISTORY:  Arthritis  Cataract  Diabetes mellitus  CAD (coronary artery disease)  HLD (hyperlipidemia)  HTN (hypertension)  S/P hysterectomy  S/P cholecystectomy      FAMILY HISTORY:  FH: diabetes mellitus      Social History:    Home Medications:        MEDICATIONS  (STANDING):  atorvastatin 10 milliGRAM(s) Oral at bedtime  dextrose 5%. 1000 milliLiter(s) (50 mL/Hr) IV Continuous <Continuous>  dextrose 50% Injectable 12.5 Gram(s) IV Push once  dextrose 50% Injectable 25 Gram(s) IV Push once  dextrose 50% Injectable 25 Gram(s) IV Push once  enoxaparin Injectable 40 milliGRAM(s) SubCutaneous daily  hydrochlorothiazide 12.5 milliGRAM(s) Oral daily  insulin glargine Injectable (LANTUS) 15 Unit(s) SubCutaneous every morning  insulin lispro (HumaLOG) corrective regimen sliding scale   SubCutaneous three times a day before meals  insulin lispro (HumaLOG) corrective regimen sliding scale   SubCutaneous at bedtime  insulin lispro Injectable (HumaLOG) 5 Unit(s) SubCutaneous three times a day before meals  losartan 50 milliGRAM(s) Oral daily    MEDICATIONS  (PRN):  dextrose 40% Gel 15 Gram(s) Oral once PRN Blood Glucose LESS THAN 70 milliGRAM(s)/deciliter  glucagon  Injectable 1 milliGRAM(s) IntraMuscular once PRN Glucose LESS THAN 70 milligrams/deciliter      Allergies    No Known Allergies    Intolerances      Review of Systems:  Constitutional: No fever  Eyes: No blurry vision  Neuro: No tremors  HEENT: No pain  Cardiovascular: No chest pain, palpitations  Respiratory: No SOB, no cough  GI: No nausea, vomiting, abdominal pain  : No dysuria  Skin: no rash  Psych: no depression  Endocrine: no polyuria, polydipsia  Hem/lymph: no swelling  Osteoporosis: no fractures    ALL OTHER SYSTEMS REVIEWED AND NEGATIVE    UNABLE TO OBTAIN    PHYSICAL EXAM:  -----------------------------  VITALS: T(C): 36.6 (01-11-20 @ 07:19)  T(F): 97.8 (01-11-20 @ 07:19), Max: 98.2 (01-10-20 @ 19:34)  HR: 61 (01-11-20 @ 07:19) (57 - 67)  BP: 113/74 (01-11-20 @ 07:19) (103/56 - 163/87)  RR:  (16 - 20)  SpO2:  (97% - 100%)  Wt(kg): --  GENERAL: NAD, well-groomed, well-developed  EYES: No proptosis, no lid lag, anicteric  HEENT:  Atraumatic, Normocephalic, moist mucous membranes  THYROID: Normal size, no palpable nodules  RESPIRATORY: Clear to auscultation bilaterally; No rales, rhonchi, wheezing, or rubs  CARDIOVASCULAR: Regular rate and rhythm; No murmurs; no peripheral edema  GI: Soft, nontender, non distended, normal bowel sounds  SKIN: Dry, intact, No rashes or lesions  MUSCULOSKELETAL: Full range of motion, normal strength  NEURO: sensation intact, extraocular movements intact, no tremor, normal reflexes  PSYCH: Alert and oriented x 3, normal affect, normal mood  CUSHING'S SIGNS: no striae    POCT Blood Glucose.: 203 mg/dL (01-11-20 @ 09:49)  POCT Blood Glucose.: 129 mg/dL (01-11-20 @ 08:39)  POCT Blood Glucose.: 132 mg/dL (01-11-20 @ 00:38)  POCT Blood Glucose.: 325 mg/dL (01-10-20 @ 19:40)                        13.7   7.54  )-----------( 304      ( 10 Jason 2020 22:14 )             42.7     01-11    143  |  106  |  12  ----------------------------<  142<H>  4.3   |  26  |  0.59    EGFR if : 114  EGFR if non : 98    Ca    9.6      01-11  Mg     2.1     01-11  Phos  3.6     01-11    TPro  6.4  /  Alb  3.8  /  TBili  0.2  /  DBili  x   /  AST  12  /  ALT  16  /  AlkPhos  73  01-11      Thyroid Function Tests:  01-11 @ 03:12 TSH 1.83 FreeT4 -- T3 -- Anti TPO -- Anti Thyroglobulin Ab -- TSI --      Hemoglobin A1C, Whole Blood: 11.0 % <H> [4.0 - 5.6] (01-11-20 @ 02:33)    Radiology:   ------------------------    < from: Xray Chest 2 Views PA/Lat (01.10.20 @ 23:13) >    EXAM:  XR CHEST PA LAT 2V                            PROCEDURE DATE:  01/10/2020            INTERPRETATION:  CLINICAL INFORMATION: Shortness of breath.    EXAM: PA and lateral chest    COMPARISON: None    FINDINGS:    Clear lungs.   There is no pleural effusion or pneumothorax.   The heart is normal in size.    IMPRESSION:    Clear lungs.      < end of copied text > HPI:  63 yo female with PMH of HTN, HLD, ?CAD, DM, cataract, arthritis, presents here with SOB.  Patient states that for last few months she has been having SOB with exertion, which is progressively getting worse.  She now gets SOB with walking up one flight of stairs, though she can walk on level ground without any issues.  She also has trouble lying flat due to SOB, but denies PND, LE swelling or weight gain.  She denies any chest pain or palpitation.  Denies any dizziness or lightheadedness.  She came to [place] 1 [place] ago from [facility].  She never had any chest pain in past, never had stress test or angiogram done.  She had echo in the past and was told everything was OK.  She came from [facility] one year ago and has not been able to see any doctor, also has been taking medication that she brought from [place].     Endocrine history obtain via [facility]  #[id number]  Patient was diagnosed with Type 2 DM for more than 25 years.  She takes her insulin which is Mixtard (NPH/Regular insulin) 70/30 at 40 units in the morning.  She does not take any oral regimen and does not recall being on oral regimens in the past.  She states that she's adherent with her medication.  She takes the insulin that she got from [facility] and hasn't not establish primary care doctor care here in the [place].  [place]  She states that she checks her glucose about 1-2 times a week.  Ranges from 6-12 mmol/L (which is about  mg/dl) range.  She had not seen an eye doctor for a while but was told she had cataract in the past.  She denies any neuropathy.  She denies any nephropathy  EGFR >100 during this admission.  She denies any history of CAD, CHF or CVA.     In regards to diet, admits to high Carb food including rotti for breakfast and dinner and rice.      She denies polyuria, polydipsia or blurry vision.     Regarding HTN, patient takes Porsan, which is Losartan 50mg daily     Regarding HLD, she takes simvastatin 10mg (Avator 10mg). Denies myopathy.      PAST MEDICAL & SURGICAL HISTORY:  Arthritis  Cataract  Diabetes mellitus  CAD (coronary artery disease)  HLD (hyperlipidemia)  HTN (hypertension)  S/P hysterectomy  S/P cholecystectomy      FAMILY HISTORY:  Mother - DM, hypertension.    Father - DM     Social History:  No smoking  No alcohol  No drugs    Home Medications:  Mixtard 30 (insulin 70/30) 40u qAM  Nebicard (Nebivolol) 2.5mg daily  Ecosprin (aspirin) 75mg daily  Prosan Hz (losartan/HCTZ) 50mg daily  Avator 10mg bedtime (10 Jason 2020 23:59)      MEDICATIONS  (STANDING):  atorvastatin 10 milliGRAM(s) Oral at bedtime  dextrose 5%. 1000 milliLiter(s) (50 mL/Hr) IV Continuous <Continuous>  dextrose 50% Injectable 12.5 Gram(s) IV Push once  dextrose 50% Injectable 25 Gram(s) IV Push once  dextrose 50% Injectable 25 Gram(s) IV Push once  enoxaparin Injectable 40 milliGRAM(s) SubCutaneous daily  hydrochlorothiazide 12.5 milliGRAM(s) Oral daily  insulin glargine Injectable (LANTUS) 15 Unit(s) SubCutaneous every morning  insulin lispro (HumaLOG) corrective regimen sliding scale   SubCutaneous three times a day before meals  insulin lispro (HumaLOG) corrective regimen sliding scale   SubCutaneous at bedtime  insulin lispro Injectable (HumaLOG) 5 Unit(s) SubCutaneous three times a day before meals  losartan 50 milliGRAM(s) Oral daily    MEDICATIONS  (PRN):  dextrose 40% Gel 15 Gram(s) Oral once PRN Blood Glucose LESS THAN 70 milliGRAM(s)/deciliter  glucagon  Injectable 1 milliGRAM(s) IntraMuscular once PRN Glucose LESS THAN 70 milligrams/deciliter      Allergies    No Known Allergies      Review of Systems:  Constitutional: No fever  Eyes: No blurry vision  Neuro: No tremors  HEENT: No pain  Cardiovascular: No chest pain, palpitations  Respiratory: No SOB, no cough  GI: No nausea, vomiting, abdominal pain  : No dysuria  Skin: no rash  MSK: Knee pain on right side   Psych: no depression  Endocrine: no polyuria, polydipsia  Hem/lymph: no swelling  Osteoporosis: no fractures    ALL OTHER SYSTEMS REVIEWED AND NEGATIVE    PHYSICAL EXAM:  -----------------------------  VITALS: T(C): 36.6 (01-11-20 @ 07:19)  T(F): 97.8 (01-11-20 @ 07:19), Max: 98.2 (01-10-20 @ 19:34)  HR: 61 (01-11-20 @ 07:19) (57 - 67)  BP: 113/74 (01-11-20 @ 07:19) (103/56 - 163/87)  RR:  (16 - 20)  SpO2:  (97% - 100%)  Wt(kg): --  GENERAL: NAD, well-groomed, well-developed  EYES: No proptosis, no lid lag, anicteric  HEENT:  Atraumatic, Normocephalic, moist mucous membranes  THYROID: Normal size, no palpable nodules  RESPIRATORY: Clear to auscultation bilaterally; No rales, rhonchi, wheezing, or rubs  CARDIOVASCULAR: Regular rate and rhythm; No murmurs; no peripheral edema  GI: Soft, nontender, non distended, normal bowel sounds  SKIN: Dry, intact, No rashes or lesions  MUSCULOSKELETAL: Full range of motion, normal strength  NEURO: sensation intact, extraocular movements intact, no tremor, normal reflexes  PSYCH: Alert and oriented x 3, normal affect, normal mood  CUSHING'S SIGNS: no striae    POCT Blood Glucose.: 203 mg/dL (01-11-20 @ 09:49)  POCT Blood Glucose.: 129 mg/dL (01-11-20 @ 08:39)  POCT Blood Glucose.: 132 mg/dL (01-11-20 @ 00:38)  POCT Blood Glucose.: 325 mg/dL (01-10-20 @ 19:40)                        13.7   7.54  )-----------( 304      ( 10 Jason 2020 22:14 )             42.7     01-11    143  |  106  |  12  ----------------------------<  142<H>  4.3   |  26  |  0.59    EGFR if : 114  EGFR if non : 98    Ca    9.6      01-11  Mg     2.1     01-11  Phos  3.6     01-11    TPro  6.4  /  Alb  3.8  /  TBili  0.2  /  DBili  x   /  AST  12  /  ALT  16  /  AlkPhos  73  01-11      Thyroid Function Tests:  01-11 @ 03:12 TSH 1.83 FreeT4 -- T3 -- Anti TPO -- Anti Thyroglobulin Ab -- TSI --      Hemoglobin A1C, Whole Blood: 11.0 % <H> [4.0 - 5.6] (01-11-20 @ 02:33)    Radiology:   ------------------------    < from: Xray Chest 2 Views PA/Lat (01.10.20 @ 23:13) >    EXAM:  XR CHEST PA LAT 2V                            PROCEDURE DATE:  01/10/2020        INTERPRETATION:  CLINICAL INFORMATION: Shortness of breath.    EXAM: PA and lateral chest    COMPARISON: None    FINDINGS:    Clear lungs.   There is no pleural effusion or pneumothorax.   The heart is normal in size.    IMPRESSION:    Clear lungs.      < end of copied text >

## 2020-01-11 NOTE — CONSULT NOTE ADULT - ASSESSMENT
DAVID FOSTER is a 63 YO F with history of uncontrolled type 2 DM, A1C 11.1% during this admission, HTN, HLD, presenting with chest pain, pending cardiac workup; endocrine consulted for hyperglycemia and uncontrolled diabetes mellitus.     #1 Type 2 DM, uncontrolled.   -Discussed with patient the importance of Carb consistent diet and exercise as tolerated.    -Recommend nutritional consultation as she has poor understanding of DM diet.    -Recommend DM education through RN staff (see physical to RN order)  -Discussed glycemic goal of a1c <7% to prevent microvascular complications of diabetes mellitus and reduce the risk of macrovascular complications.   -Recommend annual podiatry and ophthalmology follow up.   -Glucose goal of 80-180mg/dl while in patient.   -Recommend Lantus 15 at bedtime  -Recommend Humalog  5 tid with meals  -Recommend LDSS (1:50 above 150mg/dl) and night time low does sliding scale for hyperglycemia corrections    Dispo Plan:  Discussed with patient like likely basal bolus would be better in achieving glycemic control which she is agreeable with.   Can also consider adding metformin if no GI contradictions  She does not recall being on med or no.   She has [employer], If patient wishes to follow up with [facility]     [facility]  [address], [address], [place] [address]  [phone]   Please call to schedule appointment with CDE/Nutritionist and MD appointment next available     #2 HTN  BP goal 130/80, it is currently within goal   Recommend to continue with Losartan 50mg daily and HCTZ 12.5mg daily     #3 HLD  Agree with atorvastatin 10mg daily   Check fasting lipid panel  LDL goal <70 mg/dl.
63 yo female with HTN, DM and ? CAD admitted with shortness of breath and palpitations.

## 2020-01-12 LAB
ANION GAP SERPL CALC-SCNC: 11 MMOL/L — SIGNIFICANT CHANGE UP (ref 5–17)
BUN SERPL-MCNC: 13 MG/DL — SIGNIFICANT CHANGE UP (ref 7–23)
CALCIUM SERPL-MCNC: 10.2 MG/DL — SIGNIFICANT CHANGE UP (ref 8.4–10.5)
CHLORIDE SERPL-SCNC: 102 MMOL/L — SIGNIFICANT CHANGE UP (ref 96–108)
CO2 SERPL-SCNC: 26 MMOL/L — SIGNIFICANT CHANGE UP (ref 22–31)
CREAT SERPL-MCNC: 0.51 MG/DL — SIGNIFICANT CHANGE UP (ref 0.5–1.3)
CULTURE RESULTS: NO GROWTH — SIGNIFICANT CHANGE UP
D DIMER BLD IA.RAPID-MCNC: <150 NG/ML DDU — SIGNIFICANT CHANGE UP
GLUCOSE BLDC GLUCOMTR-MCNC: 205 MG/DL — HIGH (ref 70–99)
GLUCOSE BLDC GLUCOMTR-MCNC: 207 MG/DL — HIGH (ref 70–99)
GLUCOSE BLDC GLUCOMTR-MCNC: 261 MG/DL — HIGH (ref 70–99)
GLUCOSE BLDC GLUCOMTR-MCNC: 273 MG/DL — HIGH (ref 70–99)
GLUCOSE SERPL-MCNC: 201 MG/DL — HIGH (ref 70–99)
HBA1C BLD-MCNC: 11 % — HIGH (ref 4–5.6)
HCT VFR BLD CALC: 40.2 % — SIGNIFICANT CHANGE UP (ref 34.5–45)
HGB BLD-MCNC: 12.9 G/DL — SIGNIFICANT CHANGE UP (ref 11.5–15.5)
MCHC RBC-ENTMCNC: 26.9 PG — LOW (ref 27–34)
MCHC RBC-ENTMCNC: 32.1 GM/DL — SIGNIFICANT CHANGE UP (ref 32–36)
MCV RBC AUTO: 83.8 FL — SIGNIFICANT CHANGE UP (ref 80–100)
NRBC # BLD: 0 /100 WBCS — SIGNIFICANT CHANGE UP (ref 0–0)
PLATELET # BLD AUTO: 265 K/UL — SIGNIFICANT CHANGE UP (ref 150–400)
POTASSIUM SERPL-MCNC: 3.6 MMOL/L — SIGNIFICANT CHANGE UP (ref 3.5–5.3)
POTASSIUM SERPL-SCNC: 3.6 MMOL/L — SIGNIFICANT CHANGE UP (ref 3.5–5.3)
RBC # BLD: 4.8 M/UL — SIGNIFICANT CHANGE UP (ref 3.8–5.2)
RBC # FLD: 12.4 % — SIGNIFICANT CHANGE UP (ref 10.3–14.5)
SODIUM SERPL-SCNC: 139 MMOL/L — SIGNIFICANT CHANGE UP (ref 135–145)
SPECIMEN SOURCE: SIGNIFICANT CHANGE UP
WBC # BLD: 5.86 K/UL — SIGNIFICANT CHANGE UP (ref 3.8–10.5)
WBC # FLD AUTO: 5.86 K/UL — SIGNIFICANT CHANGE UP (ref 3.8–10.5)

## 2020-01-12 PROCEDURE — 99233 SBSQ HOSP IP/OBS HIGH 50: CPT

## 2020-01-12 RX ORDER — INSULIN LISPRO 100/ML
VIAL (ML) SUBCUTANEOUS
Refills: 0 | Status: DISCONTINUED | OUTPATIENT
Start: 2020-01-12 | End: 2020-01-13

## 2020-01-12 RX ORDER — INSULIN GLARGINE 100 [IU]/ML
20 INJECTION, SOLUTION SUBCUTANEOUS EVERY MORNING
Refills: 0 | Status: DISCONTINUED | OUTPATIENT
Start: 2020-01-13 | End: 2020-01-13

## 2020-01-12 RX ORDER — INSULIN LISPRO 100/ML
8 VIAL (ML) SUBCUTANEOUS
Refills: 0 | Status: DISCONTINUED | OUTPATIENT
Start: 2020-01-12 | End: 2020-01-13

## 2020-01-12 RX ADMIN — Medication 5 UNIT(S): at 09:05

## 2020-01-12 RX ADMIN — LOSARTAN POTASSIUM 50 MILLIGRAM(S): 100 TABLET, FILM COATED ORAL at 05:17

## 2020-01-12 RX ADMIN — Medication 8 UNIT(S): at 13:27

## 2020-01-12 RX ADMIN — ATORVASTATIN CALCIUM 20 MILLIGRAM(S): 80 TABLET, FILM COATED ORAL at 19:56

## 2020-01-12 RX ADMIN — INSULIN GLARGINE 15 UNIT(S): 100 INJECTION, SOLUTION SUBCUTANEOUS at 09:06

## 2020-01-12 RX ADMIN — Medication 3: at 13:26

## 2020-01-12 RX ADMIN — Medication 81 MILLIGRAM(S): at 09:07

## 2020-01-12 RX ADMIN — Medication 3: at 18:01

## 2020-01-12 RX ADMIN — ENOXAPARIN SODIUM 40 MILLIGRAM(S): 100 INJECTION SUBCUTANEOUS at 09:07

## 2020-01-12 RX ADMIN — Medication 2: at 09:05

## 2020-01-12 RX ADMIN — Medication 2: at 21:35

## 2020-01-12 RX ADMIN — Medication 8 UNIT(S): at 18:02

## 2020-01-12 NOTE — PROGRESS NOTE ADULT - SUBJECTIVE AND OBJECTIVE BOX
Subjective: Patient seen and examined. No new events except as noted.   no cp or sob   spoke with daughter on phone who translated       REVIEW OF SYSTEMS:    CONSTITUTIONAL:+ weakness, fevers or chills  EYES/ENT: No visual changes;  No vertigo or throat pain   NECK: No pain or stiffness  RESPIRATORY: No cough, wheezing, hemoptysis; No shortness of breath  CARDIOVASCULAR: No chest pain or palpitations  GASTROINTESTINAL: No abdominal or epigastric pain. No nausea, vomiting, or hematemesis; No diarrhea or constipation. No melena or hematochezia.  GENITOURINARY: No dysuria, frequency or hematuria  NEUROLOGICAL: No numbness or weakness  SKIN: No itching, burning, rashes, or lesions   All other review of systems is negative unless indicated above.    MEDICATIONS:  MEDICATIONS  (STANDING):  aspirin  chewable 81 milliGRAM(s) Oral daily  atorvastatin 20 milliGRAM(s) Oral at bedtime  dextrose 5%. 1000 milliLiter(s) (50 mL/Hr) IV Continuous <Continuous>  dextrose 50% Injectable 12.5 Gram(s) IV Push once  dextrose 50% Injectable 25 Gram(s) IV Push once  dextrose 50% Injectable 25 Gram(s) IV Push once  enoxaparin Injectable 40 milliGRAM(s) SubCutaneous daily  hydrochlorothiazide 12.5 milliGRAM(s) Oral daily  influenza   Vaccine 0.5 milliLiter(s) IntraMuscular once  insulin lispro (HumaLOG) corrective regimen sliding scale   SubCutaneous Before meals and at bedtime  insulin lispro Injectable (HumaLOG) 8 Unit(s) SubCutaneous three times a day before meals  losartan 50 milliGRAM(s) Oral daily      PHYSICAL EXAM:  T(C): 36.4 (01-12-20 @ 04:55), Max: 37 (01-11-20 @ 15:49)  HR: 55 (01-12-20 @ 04:55) (55 - 75)  BP: 123/72 (01-12-20 @ 04:55) (112/65 - 146/77)  RR: 18 (01-12-20 @ 04:55) (16 - 18)  SpO2: 99% (01-12-20 @ 04:55) (97% - 99%)  Wt(kg): --  I&O's Summary    11 Jan 2020 07:01  -  12 Jan 2020 07:00  --------------------------------------------------------  IN: 440 mL / OUT: 0 mL / NET: 440 mL      Height (cm): 152.4 (01-11 @ 20:46)    Appearance: Normal	  HEENT:   Normal oral mucosa, PERRL, EOMI	  Lymphatic: No lymphadenopathy , no edema  Cardiovascular: Normal S1 S2, No JVD, No murmurs , Peripheral pulses palpable 2+ bilaterally  Respiratory: Lungs clear to auscultation, normal effort 	  Gastrointestinal:  Soft, Non-tender, + BS	  Skin: No rashes, No ecchymoses, No cyanosis, warm to touch  Musculoskeletal: Normal range of motion, normal strength  Psychiatry:  Mood & affect appropriate  Ext: No edema      LABS:    CARDIAC MARKERS:                                12.9   5.86  )-----------( 265      ( 12 Jan 2020 06:30 )             40.2     01-12    139  |  102  |  13  ----------------------------<  201<H>  3.6   |  26  |  0.51    Ca    10.2      12 Jan 2020 06:30  Phos  3.6     01-11  Mg     2.1     01-11    TPro  6.4  /  Alb  3.8  /  TBili  0.2  /  DBili  x   /  AST  12  /  ALT  16  /  AlkPhos  73  01-11    proBNP:   Lipid Profile:   HgA1c: Hemoglobin A1C, Whole Blood: 11.0 % (01-12 @ 09:44)    TSH:     2          TELEMETRY: SR	    ECG:  	  RADIOLOGY:   DIAGNOSTIC TESTING:  [ ] Echocardiogram:  [ ]  Catheterization:  [ ] Stress Test:    OTHER:

## 2020-01-12 NOTE — PROGRESS NOTE ADULT - SUBJECTIVE AND OBJECTIVE BOX
Patient is a 64y old  Female who presents with a chief complaint of shortness of breath (2020 11:25)      INTERVAL History of Present Illness/OVERNIGHT EVENTS: seen by cardio - no inpatient ischemia work up planned  no CP or sob  unable to reach daughter over phone currently    MEDICATIONS  (STANDING):  aspirin  chewable 81 milliGRAM(s) Oral daily  atorvastatin 20 milliGRAM(s) Oral at bedtime  dextrose 5%. 1000 milliLiter(s) (50 mL/Hr) IV Continuous <Continuous>  dextrose 50% Injectable 12.5 Gram(s) IV Push once  dextrose 50% Injectable 25 Gram(s) IV Push once  dextrose 50% Injectable 25 Gram(s) IV Push once  enoxaparin Injectable 40 milliGRAM(s) SubCutaneous daily  hydrochlorothiazide 12.5 milliGRAM(s) Oral daily  influenza   Vaccine 0.5 milliLiter(s) IntraMuscular once  insulin lispro (HumaLOG) corrective regimen sliding scale   SubCutaneous Before meals and at bedtime  insulin lispro Injectable (HumaLOG) 8 Unit(s) SubCutaneous three times a day before meals  losartan 50 milliGRAM(s) Oral daily    MEDICATIONS  (PRN):  dextrose 40% Gel 15 Gram(s) Oral once PRN Blood Glucose LESS THAN 70 milliGRAM(s)/deciliter  glucagon  Injectable 1 milliGRAM(s) IntraMuscular once PRN Glucose LESS THAN 70 milligrams/deciliter      Allergies    No Known Allergies    Intolerances        REVIEW OF SYSTEMS:  Negative unless otherwise specified above.    Vital Signs Last 24 Hrs  T(C): 36.6 (2020 12:44), Max: 37 (2020 15:49)  T(F): 97.8 (2020 12:44), Max: 98.6 (2020 15:49)  HR: 59 (2020 12:44) (55 - 75)  BP: 116/58 (2020 12:44) (112/65 - 146/77)  BP(mean): --  RR: 18 (2020 12:44) (16 - 18)  SpO2: 97% (2020 12:44) (97% - 99%)    Height (cm): 152.4 (-11 @ 20:46)    PHYSICAL EXAM:  GENERAL: No apparent distress, appears stated age  HEAD:  Atraumatic, Normocephalic  EYES: Conjunctiva and sclera clear, no discharge  ENMT: Moist mucous membranes, no nasal discharge  NECK: Supple, no JVD  CHEST/LUNG: Clear to auscultation bilaterally, no wheeze or rales  HEART: Regular rate and rhythm, no murmurs, rubs or gallops  ABDOMEN: Soft, Nontender, Nondistended; Bowel sounds present  EXTREMITIES:  No clubbing, cyanosis or edema  SKIN: No rash or new discoloration  NERVOUS SYSTEM:  Alert & Oriented; Bilateral Lower extremity mobile, sensation to light touch intact      LABS:                        12.9   5.86  )-----------( 265      ( 2020 06:30 )             40.2     2020 06:30    139    |  102    |  13     ----------------------------<  201    3.6     |  26     |  0.51     Ca    10.2       2020 06:30      PT/INR - ( 10 Jason 2020 22:14 )   PT: 10.2 sec;   INR: 0.89 ratio         PTT - ( 10 Jason 2020 22:14 )  PTT:27.5 sec  Urinalysis Basic - ( 2020 00:30 )    Color: Colorless / Appearance: Clear / S.004 / pH: x  Gluc: x / Ketone: Negative  / Bili: Negative / Urobili: Negative   Blood: x / Protein: Negative / Nitrite: Negative   Leuk Esterase: Large / RBC: 0 /hpf / WBC 51 /HPF   Sq Epi: x / Non Sq Epi: 1 /hpf / Bacteria: Negative      CAPILLARY BLOOD GLUCOSE      POCT Blood Glucose.: 273 mg/dL (2020 12:31)  POCT Blood Glucose.: 205 mg/dL (2020 09:04)  POCT Blood Glucose.: 261 mg/dL (2020 00:38)  POCT Blood Glucose.: 353 mg/dL (2020 21:44)  POCT Blood Glucose.: 416 mg/dL (2020 21:42)  POCT Blood Glucose.: 195 mg/dL (2020 17:26)      RADIOLOGY & ADDITIONAL TESTS:    Images reviewed personally    Consultant Notes Reviewed and Care Discussed with relevant Consultants/Other Providers.

## 2020-01-12 NOTE — PROVIDER CONTACT NOTE (OTHER) - ASSESSMENT
Patient ,REPEAT 353,Received Lantus 15 units in the morning.Received Humalog 5 units premeal and ss coverage at dinnertime.A1c-11.0.Administered 3 units Humalog as per HS sliding scale.Patient asymptomatic.

## 2020-01-13 ENCOUNTER — TRANSCRIPTION ENCOUNTER (OUTPATIENT)
Age: 65
End: 2020-01-13

## 2020-01-13 VITALS
SYSTOLIC BLOOD PRESSURE: 138 MMHG | TEMPERATURE: 97 F | HEART RATE: 71 BPM | OXYGEN SATURATION: 97 % | DIASTOLIC BLOOD PRESSURE: 72 MMHG | RESPIRATION RATE: 18 BRPM

## 2020-01-13 LAB
ANION GAP SERPL CALC-SCNC: 12 MMOL/L — SIGNIFICANT CHANGE UP (ref 5–17)
BASOPHILS # BLD AUTO: 0.04 K/UL — SIGNIFICANT CHANGE UP (ref 0–0.2)
BASOPHILS NFR BLD AUTO: 0.6 % — SIGNIFICANT CHANGE UP (ref 0–2)
BUN SERPL-MCNC: 19 MG/DL — SIGNIFICANT CHANGE UP (ref 7–23)
CALCIUM SERPL-MCNC: 9.9 MG/DL — SIGNIFICANT CHANGE UP (ref 8.4–10.5)
CHLORIDE SERPL-SCNC: 100 MMOL/L — SIGNIFICANT CHANGE UP (ref 96–108)
CO2 SERPL-SCNC: 24 MMOL/L — SIGNIFICANT CHANGE UP (ref 22–31)
CREAT SERPL-MCNC: 0.52 MG/DL — SIGNIFICANT CHANGE UP (ref 0.5–1.3)
EOSINOPHIL # BLD AUTO: 0.12 K/UL — SIGNIFICANT CHANGE UP (ref 0–0.5)
EOSINOPHIL NFR BLD AUTO: 1.9 % — SIGNIFICANT CHANGE UP (ref 0–6)
GLUCOSE BLDC GLUCOMTR-MCNC: 239 MG/DL — HIGH (ref 70–99)
GLUCOSE BLDC GLUCOMTR-MCNC: 268 MG/DL — HIGH (ref 70–99)
GLUCOSE BLDC GLUCOMTR-MCNC: 302 MG/DL — HIGH (ref 70–99)
GLUCOSE SERPL-MCNC: 266 MG/DL — HIGH (ref 70–99)
HCT VFR BLD CALC: 46 % — HIGH (ref 34.5–45)
HGB BLD-MCNC: 13 G/DL — SIGNIFICANT CHANGE UP (ref 11.5–15.5)
IMM GRANULOCYTES NFR BLD AUTO: 0.6 % — SIGNIFICANT CHANGE UP (ref 0–1.5)
LYMPHOCYTES # BLD AUTO: 2.95 K/UL — SIGNIFICANT CHANGE UP (ref 1–3.3)
LYMPHOCYTES # BLD AUTO: 45.5 % — HIGH (ref 13–44)
MANUAL SMEAR VERIFICATION: SIGNIFICANT CHANGE UP
MCHC RBC-ENTMCNC: 27.1 PG — SIGNIFICANT CHANGE UP (ref 27–34)
MCHC RBC-ENTMCNC: 28.3 GM/DL — LOW (ref 32–36)
MCV RBC AUTO: 95.8 FL — SIGNIFICANT CHANGE UP (ref 80–100)
MONOCYTES # BLD AUTO: 0.37 K/UL — SIGNIFICANT CHANGE UP (ref 0–0.9)
MONOCYTES NFR BLD AUTO: 5.7 % — SIGNIFICANT CHANGE UP (ref 2–14)
NEUTROPHILS # BLD AUTO: 2.96 K/UL — SIGNIFICANT CHANGE UP (ref 1.8–7.4)
NEUTROPHILS NFR BLD AUTO: 45.7 % — SIGNIFICANT CHANGE UP (ref 43–77)
PLAT MORPH BLD: NORMAL — SIGNIFICANT CHANGE UP
PLATELET # BLD AUTO: 276 K/UL — SIGNIFICANT CHANGE UP (ref 150–400)
POTASSIUM SERPL-MCNC: 3.9 MMOL/L — SIGNIFICANT CHANGE UP (ref 3.5–5.3)
POTASSIUM SERPL-SCNC: 3.9 MMOL/L — SIGNIFICANT CHANGE UP (ref 3.5–5.3)
RBC # BLD: 4.8 M/UL — SIGNIFICANT CHANGE UP (ref 3.8–5.2)
RBC # FLD: 13.3 % — SIGNIFICANT CHANGE UP (ref 10.3–14.5)
RBC BLD AUTO: NORMAL — SIGNIFICANT CHANGE UP
SODIUM SERPL-SCNC: 136 MMOL/L — SIGNIFICANT CHANGE UP (ref 135–145)
WBC # BLD: 6.48 K/UL — SIGNIFICANT CHANGE UP (ref 3.8–10.5)
WBC # FLD AUTO: 6.48 K/UL — SIGNIFICANT CHANGE UP (ref 3.8–10.5)

## 2020-01-13 PROCEDURE — 99233 SBSQ HOSP IP/OBS HIGH 50: CPT

## 2020-01-13 PROCEDURE — G0378: CPT

## 2020-01-13 PROCEDURE — 84100 ASSAY OF PHOSPHORUS: CPT

## 2020-01-13 PROCEDURE — 83036 HEMOGLOBIN GLYCOSYLATED A1C: CPT

## 2020-01-13 PROCEDURE — 82435 ASSAY OF BLOOD CHLORIDE: CPT

## 2020-01-13 PROCEDURE — 85027 COMPLETE CBC AUTOMATED: CPT

## 2020-01-13 PROCEDURE — 99285 EMERGENCY DEPT VISIT HI MDM: CPT

## 2020-01-13 PROCEDURE — 84443 ASSAY THYROID STIM HORMONE: CPT

## 2020-01-13 PROCEDURE — 71046 X-RAY EXAM CHEST 2 VIEWS: CPT

## 2020-01-13 PROCEDURE — 80048 BASIC METABOLIC PNL TOTAL CA: CPT

## 2020-01-13 PROCEDURE — 83880 ASSAY OF NATRIURETIC PEPTIDE: CPT

## 2020-01-13 PROCEDURE — 93005 ELECTROCARDIOGRAM TRACING: CPT

## 2020-01-13 PROCEDURE — 85610 PROTHROMBIN TIME: CPT

## 2020-01-13 PROCEDURE — 80053 COMPREHEN METABOLIC PANEL: CPT

## 2020-01-13 PROCEDURE — 82330 ASSAY OF CALCIUM: CPT

## 2020-01-13 PROCEDURE — 84295 ASSAY OF SERUM SODIUM: CPT

## 2020-01-13 PROCEDURE — 86803 HEPATITIS C AB TEST: CPT

## 2020-01-13 PROCEDURE — 83605 ASSAY OF LACTIC ACID: CPT

## 2020-01-13 PROCEDURE — 85379 FIBRIN DEGRADATION QUANT: CPT

## 2020-01-13 PROCEDURE — 82947 ASSAY GLUCOSE BLOOD QUANT: CPT

## 2020-01-13 PROCEDURE — 84484 ASSAY OF TROPONIN QUANT: CPT

## 2020-01-13 PROCEDURE — 84132 ASSAY OF SERUM POTASSIUM: CPT

## 2020-01-13 PROCEDURE — 80061 LIPID PANEL: CPT

## 2020-01-13 PROCEDURE — 87086 URINE CULTURE/COLONY COUNT: CPT

## 2020-01-13 PROCEDURE — 85730 THROMBOPLASTIN TIME PARTIAL: CPT

## 2020-01-13 PROCEDURE — 82803 BLOOD GASES ANY COMBINATION: CPT

## 2020-01-13 PROCEDURE — 85014 HEMATOCRIT: CPT

## 2020-01-13 PROCEDURE — 99239 HOSP IP/OBS DSCHRG MGMT >30: CPT

## 2020-01-13 PROCEDURE — 81001 URINALYSIS AUTO W/SCOPE: CPT

## 2020-01-13 PROCEDURE — 82962 GLUCOSE BLOOD TEST: CPT

## 2020-01-13 PROCEDURE — 93306 TTE W/DOPPLER COMPLETE: CPT | Mod: 26

## 2020-01-13 PROCEDURE — 83735 ASSAY OF MAGNESIUM: CPT

## 2020-01-13 PROCEDURE — 93306 TTE W/DOPPLER COMPLETE: CPT

## 2020-01-13 RX ORDER — ATORVASTATIN CALCIUM 80 MG/1
1 TABLET, FILM COATED ORAL
Qty: 0 | Refills: 0 | DISCHARGE
Start: 2020-01-13

## 2020-01-13 RX ORDER — LOSARTAN/HYDROCHLOROTHIAZIDE 100MG-25MG
1 TABLET ORAL
Qty: 30 | Refills: 0
Start: 2020-01-13 | End: 2020-02-11

## 2020-01-13 RX ORDER — INSULIN GLARGINE 100 [IU]/ML
18 INJECTION, SOLUTION SUBCUTANEOUS
Qty: 2 | Refills: 0
Start: 2020-01-13 | End: 2020-02-08

## 2020-01-13 RX ORDER — METFORMIN HYDROCHLORIDE 850 MG/1
1 TABLET ORAL
Qty: 60 | Refills: 0
Start: 2020-01-13 | End: 2020-02-11

## 2020-01-13 RX ORDER — ASPIRIN/CALCIUM CARB/MAGNESIUM 324 MG
1 TABLET ORAL
Qty: 30 | Refills: 0
Start: 2020-01-13 | End: 2020-02-11

## 2020-01-13 RX ORDER — METFORMIN HYDROCHLORIDE 850 MG/1
500 TABLET ORAL
Refills: 0 | Status: DISCONTINUED | OUTPATIENT
Start: 2020-01-13 | End: 2020-01-13

## 2020-01-13 RX ORDER — INSULIN LISPRO 100/ML
12 VIAL (ML) SUBCUTANEOUS
Qty: 4 | Refills: 0
Start: 2020-01-13 | End: 2020-02-09

## 2020-01-13 RX ORDER — INSULIN GLARGINE 100 [IU]/ML
18 INJECTION, SOLUTION SUBCUTANEOUS
Qty: 1 | Refills: 0
Start: 2020-01-13 | End: 2020-02-11

## 2020-01-13 RX ORDER — INSULIN ASPART 100 [IU]/ML
12 INJECTION, SOLUTION SUBCUTANEOUS
Qty: 4 | Refills: 0
Start: 2020-01-13 | End: 2020-02-08

## 2020-01-13 RX ORDER — ASPIRIN/CALCIUM CARB/MAGNESIUM 324 MG
1 TABLET ORAL
Qty: 0 | Refills: 0 | DISCHARGE
Start: 2020-01-13

## 2020-01-13 RX ORDER — INSULIN LISPRO 100/ML
12 VIAL (ML) SUBCUTANEOUS
Qty: 1 | Refills: 0
Start: 2020-01-13 | End: 2020-02-11

## 2020-01-13 RX ORDER — INSULIN LISPRO 100/ML
10 VIAL (ML) SUBCUTANEOUS
Refills: 0 | Status: DISCONTINUED | OUTPATIENT
Start: 2020-01-13 | End: 2020-01-13

## 2020-01-13 RX ORDER — ATORVASTATIN CALCIUM 80 MG/1
1 TABLET, FILM COATED ORAL
Qty: 30 | Refills: 0
Start: 2020-01-13 | End: 2020-02-11

## 2020-01-13 RX ADMIN — Medication 10 UNIT(S): at 17:52

## 2020-01-13 RX ADMIN — Medication 81 MILLIGRAM(S): at 12:14

## 2020-01-13 RX ADMIN — METFORMIN HYDROCHLORIDE 500 MILLIGRAM(S): 850 TABLET ORAL at 17:53

## 2020-01-13 RX ADMIN — ENOXAPARIN SODIUM 40 MILLIGRAM(S): 100 INJECTION SUBCUTANEOUS at 17:53

## 2020-01-13 RX ADMIN — Medication 4: at 13:06

## 2020-01-13 RX ADMIN — Medication 10 UNIT(S): at 09:31

## 2020-01-13 RX ADMIN — INSULIN GLARGINE 20 UNIT(S): 100 INJECTION, SOLUTION SUBCUTANEOUS at 12:13

## 2020-01-13 RX ADMIN — Medication 10 UNIT(S): at 13:06

## 2020-01-13 RX ADMIN — Medication 2: at 09:31

## 2020-01-13 RX ADMIN — Medication 3: at 17:53

## 2020-01-13 RX ADMIN — LOSARTAN POTASSIUM 50 MILLIGRAM(S): 100 TABLET, FILM COATED ORAL at 05:49

## 2020-01-13 NOTE — PROGRESS NOTE ADULT - SUBJECTIVE AND OBJECTIVE BOX
Patient is a 64y old  Female who presents with a chief complaint of shortness of breath (13 Jan 2020 12:03)      INTERVAL History of Present Illness/OVERNIGHT EVENTS: daughter at bedside.  current plan of care and discharge plan d/w her in detail.  no acute medical issues currently    MEDICATIONS  (STANDING):  aspirin  chewable 81 milliGRAM(s) Oral daily  atorvastatin 20 milliGRAM(s) Oral at bedtime  dextrose 5%. 1000 milliLiter(s) (50 mL/Hr) IV Continuous <Continuous>  dextrose 50% Injectable 12.5 Gram(s) IV Push once  dextrose 50% Injectable 25 Gram(s) IV Push once  dextrose 50% Injectable 25 Gram(s) IV Push once  enoxaparin Injectable 40 milliGRAM(s) SubCutaneous daily  hydrochlorothiazide 12.5 milliGRAM(s) Oral daily  influenza   Vaccine 0.5 milliLiter(s) IntraMuscular once  insulin glargine Injectable (LANTUS) 20 Unit(s) SubCutaneous every morning  insulin lispro (HumaLOG) corrective regimen sliding scale   SubCutaneous Before meals and at bedtime  insulin lispro Injectable (HumaLOG) 10 Unit(s) SubCutaneous three times a day before meals  losartan 50 milliGRAM(s) Oral daily  metFORMIN 500 milliGRAM(s) Oral two times a day    MEDICATIONS  (PRN):  dextrose 40% Gel 15 Gram(s) Oral once PRN Blood Glucose LESS THAN 70 milliGRAM(s)/deciliter  glucagon  Injectable 1 milliGRAM(s) IntraMuscular once PRN Glucose LESS THAN 70 milligrams/deciliter      Allergies    No Known Allergies    Intolerances        REVIEW OF SYSTEMS:  Negative unless otherwise specified above.    Vital Signs Last 24 Hrs  T(C): 36.3 (13 Jan 2020 12:30), Max: 36.8 (12 Jan 2020 20:34)  T(F): 97.4 (13 Jan 2020 12:30), Max: 98.3 (12 Jan 2020 20:34)  HR: 71 (13 Jan 2020 12:30) (63 - 71)  BP: 138/72 (13 Jan 2020 12:30) (121/64 - 138/72)  BP(mean): --  RR: 18 (13 Jan 2020 12:30) (18 - 18)  SpO2: 97% (13 Jan 2020 12:30) (97% - 99%)        PHYSICAL EXAM:  GENERAL: No apparent distress, appears stated age  HEAD:  Atraumatic, Normocephalic  EYES: Conjunctiva and sclera clear, no discharge  ENMT: Moist mucous membranes, no nasal discharge  NECK: Supple, no JVD  CHEST/LUNG: Clear to auscultation bilaterally, no wheeze or rales  HEART: Regular rate and rhythm, no murmurs, rubs or gallops  ABDOMEN: Soft, Nontender, Nondistended; Bowel sounds present  EXTREMITIES:  No clubbing, cyanosis or edema  SKIN: No rash or new discoloration  NERVOUS SYSTEM:  Alert & Oriented; Bilateral Lower extremity mobile, sensation to light touch intact      LABS:                        13.0   6.48  )-----------( 276      ( 13 Jan 2020 09:01 )             46.0     13 Jan 2020 06:04    136    |  100    |  19     ----------------------------<  266    3.9     |  24     |  0.52     Ca    9.9        13 Jan 2020 06:04          CAPILLARY BLOOD GLUCOSE      POCT Blood Glucose.: 302 mg/dL (13 Jan 2020 12:11)  POCT Blood Glucose.: 239 mg/dL (13 Jan 2020 09:27)  POCT Blood Glucose.: 207 mg/dL (12 Jan 2020 21:27)  POCT Blood Glucose.: 273 mg/dL (12 Jan 2020 17:23)      RADIOLOGY & ADDITIONAL TESTS:    Images reviewed personally    Consultant Notes Reviewed and Care Discussed with relevant Consultants/Other Providers.

## 2020-01-13 NOTE — PROGRESS NOTE ADULT - ATTENDING COMMENTS
plan of care discussed with medicine NP     outpt PCP/cardio/endo f/up within 1-2 weeks
plan of care discussed with medicine NP     Hudson Claros MD, MHA, FACP, [facility], [facility]  Pager: [phone]
plan of care discussed with medicine NP   diabetic teaching bedside.  possible discharge tomorrow

## 2020-01-13 NOTE — DISCHARGE NOTE PROVIDER - NSDCMRMEDTOKEN_GEN_ALL_CORE_FT
aspirin: 75 milligram(s) orally once a day  losartan-hydrochlorothiazide 50mg-12.5mg oral tablet: 1 tab(s) orally once a day  nebivolol 2.5 mg oral tablet: 1 tab(s) orally once a day alcohol pads : 3 times a day   aspirin 81 mg oral tablet, chewable: 1 tab(s) orally once a day  atorvastatin 20 mg oral tablet: 1 tab(s) orally once a day (at bedtime)  BD Queenie needles: 4 times a day   glucometer: 3 times a day (before meals)   HumaLOG KwikPen 100 units/mL injectable solution: 12 unit(s) injectable 3 times a day (before meals)   lancets: 3 times a day   Lantus Solostar Pen 100 units/mL subcutaneous solution: 18  subcutaneous once a day (at bedtime)   losartan-hydrochlorothiazide 50mg-12.5mg oral tablet: 1 tab(s) orally once a day  test strips: 3 times a day alcohol pads : 3 times a day   aspirin 81 mg oral tablet, chewable: 1 tab(s) orally once a day  atorvastatin 20 mg oral tablet: 1 tab(s) orally once a day (at bedtime)  BD Queenie needles: 4 times a day   glucometer: 3 times a day (before meals)   HumaLOG KwikPen 100 units/mL injectable solution: 12 unit(s) injectable 3 times a day (before meals)   lancets: 3 times a day   Lantus Solostar Pen 100 units/mL subcutaneous solution: 5 unit(s) subcutaneous X1 tonight   Lantus Solostar Pen 100 units/mL subcutaneous solution: 18  subcutaneous once a day (at bedtime)   losartan-hydrochlorothiazide 50mg-12.5mg oral tablet: 1 tab(s) orally once a day  metFORMIN 500 mg oral tablet: 1 tab(s) orally 2 times a day  test strips: 3 times a day alcohol pads : 3 times a day MDD:3  aspirin 81 mg oral tablet, chewable: 1 tab(s) orally once a day  atorvastatin 20 mg oral tablet: 1 tab(s) orally once a day (at bedtime)  Basaglar KwikPen 100 units/mL subcutaneous solution: 18 unit(s) subcutaneous once a day (at bedtime) MDD:18 units a day  BD Queenie needles: 4 times a day   HumaLOG KwikPen 100 units/mL injectable solution: 12 unit(s) injectable 3 times a day (with meals) MDD:36 units (MAXIMUM DAILY DOSAGE)  lancets: 1 application subcutaneously 4 times a day MDD:4  losartan-hydrochlorothiazide 50mg-12.5mg oral tablet: 1 tab(s) orally once a day  metFORMIN 500 mg oral tablet: 1 tab(s) orally 2 times a day  test strips (per patient&#x27;s insurance): 1 application subcutaneously 4 times a day. ** Compatible with patient&#x27;s glucometer ** MDD:4 alcohol pads : 3 times a day MDD:3  aspirin 81 mg oral tablet, chewable: 1 tab(s) orally once a day  atorvastatin 20 mg oral tablet: 1 tab(s) orally once a day (at bedtime)  BD Queenie needles: 4 times a day   glucometer: 1   3 times a day (before meals) MDD:1  lancets: 1 application subcutaneously 4 times a day MDD:4   losartan-hydrochlorothiazide 50mg-12.5mg oral tablet: 1 tab(s) orally once a day  metFORMIN 500 mg oral tablet: 1 tab(s) orally 2 times a day  test strips (per patient&#x27;s insurance): 1 application subcutaneously 4 times a day . ** Compatible with patient&#x27;s glucometer ** MDD:4

## 2020-01-13 NOTE — CHART NOTE - NSCHARTNOTEFT_GEN_A_CORE
Pt seen as per NP/consult to be educated on diabetes diet ed prior to discharge. plan for pt to be discharged today. RD contacted daughter who was used to translate for pt. [facility]  not available via  phone. RD attempted x 3. RD provided CHO counting, diabetes label reading tips.

## 2020-01-13 NOTE — PROGRESS NOTE ADULT - SUBJECTIVE AND OBJECTIVE BOX
Subjective: Patient seen and examined. No new events except as noted.     REVIEW OF SYSTEMS:    CONSTITUTIONAL: No weakness, fevers or chills  EYES/ENT: No visual changes;  No vertigo or throat pain   NECK: No pain or stiffness  RESPIRATORY: No cough, wheezing, hemoptysis; No shortness of breath  CARDIOVASCULAR: No chest pain or palpitations  GASTROINTESTINAL: No abdominal or epigastric pain. No nausea, vomiting, or hematemesis; No diarrhea or constipation. No melena or hematochezia.  GENITOURINARY: No dysuria, frequency or hematuria  NEUROLOGICAL: No numbness or weakness  SKIN: No itching, burning, rashes, or lesions   All other review of systems is negative unless indicated above.    MEDICATIONS:  MEDICATIONS  (STANDING):  aspirin  chewable 81 milliGRAM(s) Oral daily  atorvastatin 20 milliGRAM(s) Oral at bedtime  dextrose 5%. 1000 milliLiter(s) (50 mL/Hr) IV Continuous <Continuous>  dextrose 50% Injectable 12.5 Gram(s) IV Push once  dextrose 50% Injectable 25 Gram(s) IV Push once  dextrose 50% Injectable 25 Gram(s) IV Push once  enoxaparin Injectable 40 milliGRAM(s) SubCutaneous daily  hydrochlorothiazide 12.5 milliGRAM(s) Oral daily  influenza   Vaccine 0.5 milliLiter(s) IntraMuscular once  insulin glargine Injectable (LANTUS) 20 Unit(s) SubCutaneous every morning  insulin lispro (HumaLOG) corrective regimen sliding scale   SubCutaneous Before meals and at bedtime  insulin lispro Injectable (HumaLOG) 8 Unit(s) SubCutaneous three times a day before meals  losartan 50 milliGRAM(s) Oral daily      PHYSICAL EXAM:  T(C): 36.4 (01-13-20 @ 05:24), Max: 36.8 (01-12-20 @ 20:34)  HR: 63 (01-13-20 @ 05:24) (59 - 69)  BP: 125/79 (01-13-20 @ 05:24) (116/58 - 125/79)  RR: 18 (01-13-20 @ 05:24) (18 - 18)  SpO2: 99% (01-13-20 @ 05:24) (97% - 99%)  Wt(kg): --  I&O's Summary    12 Jan 2020 07:01  -  13 Jan 2020 07:00  --------------------------------------------------------  IN: 880 mL / OUT: 0 mL / NET: 880 mL          Appearance: Normal	  HEENT:   Normal oral mucosa, PERRL, EOMI	  Lymphatic: No lymphadenopathy , no edema  Cardiovascular: Normal S1 S2, No JVD, No murmurs , Peripheral pulses palpable 2+ bilaterally  Respiratory: Lungs clear to auscultation, normal effort 	  Gastrointestinal:  Soft, Non-tender, + BS	  Skin: No rashes, No ecchymoses, No cyanosis, warm to touch  Musculoskeletal: Normal range of motion, normal strength  Psychiatry:  Mood & affect appropriate  Ext: No edema      LABS:    CARDIAC MARKERS:                                12.9   5.86  )-----------( 265      ( 12 Jan 2020 06:30 )             40.2     01-13    136  |  100  |  19  ----------------------------<  266<H>  3.9   |  24  |  0.52    Ca    9.9      13 Jan 2020 06:04      proBNP:   Lipid Profile:   HgA1c: Hemoglobin A1C, Whole Blood: 11.0 % (01-12 @ 09:44)    TSH:     2          TELEMETRY: 	SR    ECG:  	  RADIOLOGY:   DIAGNOSTIC TESTING:  [ ] Echocardiogram:  [ ]  Catheterization:  [ ] Stress Test:    OTHER:

## 2020-01-13 NOTE — PROGRESS NOTE ADULT - PROBLEM SELECTOR PLAN 4
Blood pressure meds with hold parameters

## 2020-01-13 NOTE — PROGRESS NOTE ADULT - PROBLEM SELECTOR PLAN 2
RODGER  Endocrine consulted
RODGER  Endocrine consulted
never had stress or angiogram   aspirin and atorvastatin  outpt cardio f/up
never had stress or angiogram   aspirin and atorvastatin  outpt cardio f/up  no cp or sob since admission or even prior
never had stress or angiogram  will start patient on aspirin and atorvastatin  f/up cardio recs - would benefit from stress test ?outpatient

## 2020-01-13 NOTE — DISCHARGE NOTE PROVIDER - CARE PROVIDER_API CALL
Elaine Ramirez  pcp  Phone: (   )    -  Fax: (   )    -  Follow Up Time:     Giovanni Mack (DO)  Cardiology; Internal Medicine  [address] [facility] [address], [address]  [place] [address]  Phone: [phone]  Fax: [phone]  Follow Up Time: Giovanni Mack (DO)  Cardiology; Internal Medicine  [address] [facility], [address]  [place] [address]  Phone: [phone]  Fax: [phone]  Follow Up Time:     Elaine Ramirez  pcp  Phone: (   )    -  Fax: (   )    -  Follow Up Time:     Stacia Oliva (DO)  EndocrinologyMetabDiabetes; Internal Medicine  [address] [facility], [address]  [place] [address]  Phone: [phone]  Fax: [phone]  Follow Up Time:

## 2020-01-13 NOTE — PROGRESS NOTE ADULT - PROBLEM SELECTOR PLAN 6
no symptoms  hold off antibiotics

## 2020-01-13 NOTE — DISCHARGE NOTE PROVIDER - HOSPITAL COURSE
65 yo female with PMH of HTN, HLD, ?CAD, DM, cataract, arthritis, presents here with SOB. 65 yo female with PMH of HTN, HLD, ?CAD, DM, cataract, arthritis, presents here with intermittent palpitations.    seen and cleared by cardio for outpatient follow up.    No arrhythmias detected.    Patient/family requesting good outpatient follow up.

## 2020-01-13 NOTE — DISCHARGE NOTE PROVIDER - PROVIDER TOKENS
FREE:[LAST:[DR Cavazos],FIRST:[Elaine],PHONE:[(   )    -],FAX:[(   )    -],ADDRESS:[pcp]],PROVIDER:[TOKEN:[[id number]]] PROVIDER:[TOKEN:[[id number]]],FREE:[LAST:[DR Cavazos],FIRST:[Elaine],PHONE:[(   )    -],FAX:[(   )    -],ADDRESS:[pcp]],PROVIDER:[TOKEN:[[id number]]]

## 2020-01-13 NOTE — PROGRESS NOTE ADULT - ASSESSMENT
63 yo female with HTN, DM and ? CAD admitted with shortness of breath and palpitations.
63 yo female with PMH of HTN, HLD, ?CAD, DM, cataract, arthritis, presents here with vague history of intermittent palpitations and to establish care?
65 yo female with HTN, DM and ? CAD admitted with shortness of breath and palpitations.
65y/o F from [place] and Malay speaking with history of uncontrolled T2DM (A1C 11.1%). On 70/30 insulin once a day PTA. Also h/o HTN, HLD, arthritis and cataract. Here with CP/SOB> now resolved and going home with further follow up as out pt.  Tolerating POs with glycemic control above goal while on present insulin doses. Will need to adjust insulin up to BG goal 100s to 180s.  Received AM Lantus dose past 12noon today. Going home today and needs to learn use of insulin pen.  Met with patient and daughter and reviewed the following:    -A1c LEVEL: Present and goal  -Blood glucose goals: 100s to 150s as out pt  -Glucose monitoring frequency: ac and hs  -Hypoglycemia prevention, detection and treatment  -Healthy eating and portion control. Awaiting RD eval  -Insulin(s) action, time of administration and side effects. Basal/bolus  -Importance of follow up care. See f/u apts  Left pt and daughter watching educational video regarding insulin pen use and prevention/tx of hypoglycemia.  Pt and daughter able to verbalized understanding and give teach back regarding frequency of insulin administration and basal/bolus insulin action.   Spent over 30 minutes providing face to face education and working on discharge plan.
63 yo female with PMH of HTN, HLD, ?CAD, DM, cataract, arthritis, presents here with vague history of intermittent palpitations and to establish care?
65 yo female with PMH of HTN, HLD, ?CAD, DM, cataract, arthritis, presents here with vague history of intermittent palpitations and to establish care?

## 2020-01-13 NOTE — PROGRESS NOTE ADULT - PROBLEM SELECTOR PLAN 3
Awaiting TTE
Awaiting TTE
A1c 11  adjusting insulin dose daily as appropriate based on glucose levels.
A1c 11  adjusting insulin dose daily as appropriate based on glucose levels.  consider metformin 500 twice daily
A1c 11  adjust insulin dose daily as appropriate based on glucose levels.

## 2020-01-13 NOTE — PROGRESS NOTE ADULT - PROBLEM SELECTOR PROBLEM 2
Uncontrolled type 2 diabetes mellitus with hyperglycemia
Uncontrolled type 2 diabetes mellitus with hyperglycemia
CAD (coronary artery disease)

## 2020-01-13 NOTE — DISCHARGE NOTE PROVIDER - NSDCFUADDAPPT_GEN_ALL_CORE_FT
please follow up with medicine clinic  call for appointment at [phone]  please call  Diabetes clinic for appointment at   please follow up with DR Mack  cardiologist for further work up please follow up with medicine clinic  call for appointment at [phone]  You have an apportionment with DR Oliva  on 04/2/2020 at 0345   You have  an aapoitment with  Diabetes  Educator Soledad Westbrook on 01/30/2020 at 0230 at [address]  [place]  call at   please follow up with DR Mack  cardiologist for further work up

## 2020-01-13 NOTE — DISCHARGE NOTE PROVIDER - CARE PROVIDERS DIRECT ADDRESSES
,DirectAddress_Unknown,DirectAddress_Unknown ,DirectAddress_Unknown,DirectAddress_Unknown,[email or web address]

## 2020-01-13 NOTE — DISCHARGE NOTE NURSING/CASE MANAGEMENT/SOCIAL WORK - PATIENT PORTAL LINK FT
You can access the FollowMyHealth Patient Portal offered by [facility] by registering at the following website: [email or web address]. By joining [facility]’s FollowMyHealth portal, you will also be able to view your health information using other applications (apps) compatible with our system.

## 2020-01-13 NOTE — PROGRESS NOTE ADULT - PROBLEM SELECTOR PLAN 1
DISCHARGE:  -Test BG ac and hs  -Start Lantus 5 units x1 tonight to bridge to bedtime administration tomorrow night.  -Start Lantus 18 units qhs tomorrow  1/14/20  -Increase Humalog dose to 12 units ac meals.  -Discontinue Humalog correction scales upon discharge  -Add Metformin 500mg bid  -Please write Rxs for: Solo Star Insulin pen/Humalog Kwik insulin pen/Queenie insulin pen needles/glucose meter/strips/lancets and glucose gel # 3 use as directed  -Spoke to RN to teach pt/daughter use of insulin pen/glucose meter prior discharge.  -RD to see pt prior discharge. Ordered RD consult yesterday and today.  -Spoke to  to send home care to review use of insulin pen since pt is new to this as well as basal/bolus insulin therapy  -Made f/u apts for 1/30 at 2:30 with Soledad BEARD and 4/2 at 3:45 with Dr. Oliva. [address] [place] [address], [address], [place] [address]  [phone]   -Plan discussed with pt/daughter.RN/ and team.  Contact info: [phone] (24/7). pager [phone]
No events on tele   awaiting TTE
No events on tele   awaiting TTE
poor historian  telemonitor shows no events  f/up TTE but can be done as outpt
poor historian  telemonitor shows no events  f/up TTE but can be done as outpt  cleared by cardio for discharge
poor historian  telemonitor  f/up cardio recs

## 2020-01-13 NOTE — DISCHARGE NOTE NURSING/CASE MANAGEMENT/SOCIAL WORK - NSDCFUADDAPPT_GEN_ALL_CORE_FT
please follow up with medicine clinic  call for appointment at [phone]  You have an apportionment with DR Oliva  on 04/2/2020 at 0345   You have  an aapoitment with  Diabetes  Educator Soledad Westbrook on 01/30/2020 at 0230 at [address]  [place]  call at   please follow up with DR Mack  cardiologist for further work up

## 2020-01-13 NOTE — PROGRESS NOTE ADULT - PROBLEM SELECTOR PROBLEM 1
Palpitations
Palpitations
Uncontrolled type 2 diabetes mellitus with hyperglycemia
Palpitations

## 2020-01-13 NOTE — PROGRESS NOTE ADULT - SUBJECTIVE AND OBJECTIVE BOX
DIABETES FOLLOW UP NOTE: Saw pt earlier today  INTERVAL HX: 63y/o F from [place] and Welsh speaking with history of uncontrolled T2DM (A1C 11.1%). On 70/30 insulin once a day PTA. Also h/o HTN, HLD, arthritis and cataract. Here with CP/SOB> now resolved and going home today with further follow up as out pt.  Met with pt and daughter. Pt refused to use official Welsh  and requesting daughter to translate.  Pt reports tolerating POs with no further SOB/CP. Has not learned how to use an insulin pen yet and doesn't have a glucose meter/strips/lancets per report. Glycemic control above goal with BG 200s to 300s in last 24 hours. Pt didn't received AM Lantus dose  until past 12noon today. Also remains with prandial hyperglycemia while on present insulin doses. Pt reports not eating extra food or at night while in hospital. Pt willing to do basal/bolus insulin at home with the assistance of daughter. Primary team added Metformin to regimen today      Review of Systems:  General: As above  Cardiovascular: No chest pain, palpitations  Respiratory: No SOB, no cough  GI: No nausea, vomiting, abdominal pain  Endocrine: no polyuria, polydipsia or S&Sx of hypoglycemia    Allergies    No Known Allergies    Intolerances      MEDICATIONS:  atorvastatin 20 milliGRAM(s) Oral at bedtime  insulin glargine Injectable (LANTUS) 20 Unit(s) SubCutaneous every morning  insulin lispro (HumaLOG) corrective regimen sliding scale   SubCutaneous Before meals and at bedtime  insulin lispro Injectable (HumaLOG) 10 Unit(s) SubCutaneous three times a day before meals  metFORMIN 500 milliGRAM(s) Oral two times a day      PHYSICAL EXAM:  VITALS: T(C): 36.3 (01-13-20 @ 12:30)  T(F): 97.4 (01-13-20 @ 12:30), Max: 98.3 (01-12-20 @ 20:34)  HR: 71 (01-13-20 @ 12:30) (63 - 71)  BP: 138/72 (01-13-20 @ 12:30) (121/64 - 138/72)  RR:  (18 - 18)  SpO2:  (97% - 99%)  Wt(kg): --  GENERAL: Female laying in bed in NAD. Daughter at bedside  Abdomen: Soft, nontender, non distended, central adiposity  Extremities: Warm, no edema in all 4 exts  NEURO: A&O X3. Bo lucas    LABS:  POCT Blood Glucose.: 302 mg/dL (01-13-20 @ 12:11)  POCT Blood Glucose.: 239 mg/dL (01-13-20 @ 09:27)  POCT Blood Glucose.: 207 mg/dL (01-12-20 @ 21:27)  POCT Blood Glucose.: 273 mg/dL (01-12-20 @ 17:23)  POCT Blood Glucose.: 273 mg/dL (01-12-20 @ 12:31)  POCT Blood Glucose.: 205 mg/dL (01-12-20 @ 09:04)  POCT Blood Glucose.: 261 mg/dL (01-12-20 @ 00:38)  POCT Blood Glucose.: 353 mg/dL (01-11-20 @ 21:44)  POCT Blood Glucose.: 416 mg/dL (01-11-20 @ 21:42)  POCT Blood Glucose.: 195 mg/dL (01-11-20 @ 17:26)  POCT Blood Glucose.: 219 mg/dL (01-11-20 @ 12:29)  POCT Blood Glucose.: 203 mg/dL (01-11-20 @ 09:49)  POCT Blood Glucose.: 129 mg/dL (01-11-20 @ 08:39)  POCT Blood Glucose.: 132 mg/dL (01-11-20 @ 00:38)  POCT Blood Glucose.: 325 mg/dL (01-10-20 @ 19:40)                            13.0   6.48  )-----------( 276      ( 13 Jan 2020 09:01 )             46.0       01-13    136  |  100  |  19  ----------------------------<  266<H>  3.9   |  24  |  0.52    EGFR if : 119  EGFR if non : 102    Ca    9.9      01-13  Mg     2.1     01-11  Phos  3.6     01-11    TPro  6.4  /  Alb  3.8  /  TBili  0.2  /  DBili  x   /  AST  12  /  ALT  16  /  AlkPhos  73  01-11      Thyroid Function Tests:  01-11 @ 10:52 TSH 2.28 FreeT4 -- T3 -- Anti TPO -- Anti Thyroglobulin Ab -- TSI --  01-11 @ 03:12 TSH 1.83 FreeT4 -- T3 -- Anti TPO -- Anti Thyroglobulin Ab -- TSI --      Hemoglobin A1C, Whole Blood: 11.0 % <H> [4.0 - 5.6] (01-12-20 @ 09:44)  Hemoglobin A1C, Whole Blood: 11.0 % <H> [4.0 - 5.6] (01-11-20 @ 02:33)      01-11 Chol 169 <H> HDL 42<L> Trig 71

## 2020-01-13 NOTE — PROGRESS NOTE ADULT - PROBLEM SELECTOR PLAN 7
1.  Name of PCP: No PMD  2.  PCP Contacted on Admission: [ ] Y    [ ] N    3.  PCP contacted at Discharge: [ ] Y    [ ] N    [ ] N/A  4.  Post-Discharge Appointment Date and Location:  5.  Summary of Handoff given to PCP:

## 2020-01-13 NOTE — DISCHARGE NOTE PROVIDER - NSDCCPCAREPLAN_GEN_ALL_CORE_FT
PRINCIPAL DISCHARGE DIAGNOSIS  Diagnosis: Anginal pain  Assessment and Plan of Treatment:   HOME CARE INSTRUCTIONS  For the next few days, avoid physical activities that bring on chest pain. Continue physical activities as directed.  Do not smoke.  Avoid drinking alcohol.   Only take over-the-counter or prescription medicine for pain, discomfort, or fever as directed by your caregiver.  Follow your caregiver's suggestions for further testing if your chest pain does not go away.  Keep any follow-up appointments you made. If you do not go to an appointment, you could develop lasting (chronic) problems with pain. If there is any problem keeping an appointment, you must call to reschedule.   SEEK MEDICAL CARE IF:  You think you are having problems from the medicine you are taking. Read your medicine instructions carefully.  Your chest pain does not go away, even after treatment.  You develop a rash with blisters on your chest.  SEEK IMMEDIATE MEDICAL CARE IF:  You have increased chest pain or pain that spreads to your arm, neck, jaw, back, or abdomen.   You develop shortness of breath, an increasing cough, or you are coughing up blood.  You have severe back or abdominal pain, feel nauseous, or vomit.  You develop severe weakness, fainting, or chills.  You have a fever.  THIS IS AN EMERGENCY. Do not wait to see if the pain will go away. Get medical help at once. Call your local emergency services (_____________________). Do not drive yourself to the hospital.        SECONDARY DISCHARGE DIAGNOSES  Diagnosis: Hypertension, unspecified type  Assessment and Plan of Treatment: Follow up with your medical doctor to establish long term blood pressure treatment goals.      Diagnosis: Uncontrolled type 2 diabetes mellitus with hyperglycemia  Assessment and Plan of Treatment: HgA1C this admission 11.0  Make sure you get your HgA1c checked every three months.  If you take oral diabetes medications, check your blood glucose two times a day.  If you take insulin, check your blood glucose before meals and at bedtime.  It's important not to skip any meals.  Keep a log of your blood glucose results and always take it with you to your doctor appointments.  Keep a list of your current medications including injectables and over the counter medications and bring this medication list with you to all your doctor appointments.  If you have not seen your opthalmologist this year call for appointment.  Check your feet daily for redness, sores, or openings. Do not self treat. If no improvement in two days call your primary care physician for an appointment.  Low blood sugar (hypoglycemia) is a blood sugar below 70mg/dl. Check your blood sugar if you feel signs/symptoms of hypoglycemia. If your blood sugar is below 70 take 15 grams of carbohydrates (ex 4 oz of apple juice, 3-4 glucosr tablets, or 4-6 oz of regular soda) wait 15 minutes and repeat blood sugar to make sure it comes up above 70.  If your blood sugar is above 70 and you are due for a meal, have a meal.  If you are not due for a meal have a snack.  This snack helps keeps your blood sugar at a safe range.

## 2020-01-14 NOTE — CHART NOTE - NSCHARTNOTEFT_GEN_A_CORE
Chart/Event Note  [address]  DAVID FOSTER, 64y, Female  [id number]    Reason for Notification:   Notified by NEGRO Howell that above patient's daughter was calling unit due to prescription issue post discharge.     Events:  Patient discharged with insulin prescriptions as specified by Endocrinology team:         1)  2)  3)  4)   5)     Bull Pimentel NP  Department of Medicine   # Chart/Event Note  [address]  DAVID FOSTER, 64y, Female  [id number]    Reason for Notification:   Notified by [facility] NEGRO Howell that above patient's daughter was calling unit due to prescription issue post discharge.     Events:  Patient discharged by day medicine team with insulin prescriptions as specified by Endocrinology: Lantus SoloStar and Humalog KwikiPen. Patient's outpatient pharmacy reported both medications require prior authorization due to patient's insurance. Called and reviewed with outpatient [facility] Pharmacist, no prior authorization in place, able to re-prescribe Lantus SoloStar as Basaglar KwikiPen, however no therapeutic interchange was available for Humalog KwikiPen.   Called patient's insurance, successfully obtained prior authorization for Humalog Kwikipen for 12 months. Auth provided to [facility]. Re-prescribed both Basaglar KwikiPen and Humalog KwikiPen.     Above was discussed and reviewed with patient's daughter who will  medications.    Bull Pimentel NP  Department of Medicine   #[id number]

## 2020-01-16 LAB — CYTOLOGY CVX/VAG DOC THIN PREP: NORMAL

## 2020-01-16 NOTE — DISCUSSION/SUMMARY
[Home] : patient was discharged to home [FreeTextEntry1] : Spoke with pt f/u s/p hospitalization, pt has f/u visit appoint on 1/27/2020 with ELIANA Cote

## 2020-01-22 ENCOUNTER — APPOINTMENT (OUTPATIENT)
Dept: INTERNAL MEDICINE | Facility: CLINIC | Age: 65
End: 2020-01-22
Payer: MEDICAID

## 2020-01-22 PROCEDURE — 93306 TTE W/DOPPLER COMPLETE: CPT | Mod: 26

## 2020-01-22 PROCEDURE — 93306 TTE W/DOPPLER COMPLETE: CPT | Mod: TC

## 2020-01-27 ENCOUNTER — APPOINTMENT (OUTPATIENT)
Dept: INTERNAL MEDICINE | Facility: CLINIC | Age: 65
End: 2020-01-27

## 2020-01-27 ENCOUNTER — APPOINTMENT (OUTPATIENT)
Dept: INTERNAL MEDICINE | Facility: CLINIC | Age: 65
End: 2020-01-27
Payer: MEDICAID

## 2020-01-27 VITALS
BODY MASS INDEX: 26.62 KG/M2 | HEART RATE: 82 BPM | SYSTOLIC BLOOD PRESSURE: 140 MMHG | OXYGEN SATURATION: 98 % | DIASTOLIC BLOOD PRESSURE: 70 MMHG | WEIGHT: 123 LBS | TEMPERATURE: 98.5 F

## 2020-01-27 VITALS — DIASTOLIC BLOOD PRESSURE: 70 MMHG | SYSTOLIC BLOOD PRESSURE: 130 MMHG

## 2020-01-27 DIAGNOSIS — Z83.438 FAMILY HISTORY OF OTHER DISORDER OF LIPOPROTEIN METABOLISM AND OTHER LIPIDEMIA: ICD-10-CM

## 2020-01-27 DIAGNOSIS — Z01.419 ENCOUNTER FOR GYNECOLOGICAL EXAMINATION (GENERAL) (ROUTINE) W/OUT ABNORMAL FINDINGS: ICD-10-CM

## 2020-01-27 DIAGNOSIS — H26.9 UNSPECIFIED CATARACT: ICD-10-CM

## 2020-01-27 PROCEDURE — 94010 BREATHING CAPACITY TEST: CPT

## 2020-01-27 PROCEDURE — 36415 COLL VENOUS BLD VENIPUNCTURE: CPT

## 2020-01-27 PROCEDURE — 93000 ELECTROCARDIOGRAM COMPLETE: CPT

## 2020-01-27 PROCEDURE — 99214 OFFICE O/P EST MOD 30 MIN: CPT | Mod: 25

## 2020-01-27 NOTE — REVIEW OF SYSTEMS
[Fever] : no fever [Sore Throat] : no sore throat [Chest Pain] : no chest pain [Palpitations] : no palpitations [Lower Ext Edema] : no lower extremity edema [Shortness Of Breath] : no shortness of breath [Cough] : no cough [Dyspnea on Exertion] : no dyspnea on exertion [Abdominal Pain] : no abdominal pain [Nausea] : no nausea [Diarrhea] : diarrhea [Vomiting] : no vomiting [Dysuria] : no dysuria [Dizziness] : no dizziness

## 2020-01-27 NOTE — HISTORY OF PRESENT ILLNESS
[No Pertinent Cardiac History] : no history of aortic stenosis, atrial fibrillation, coronary artery disease, recent myocardial infarction, or implantable device/pacemaker [Asthma] : asthma [No Adverse Anesthesia Reaction] : no adverse anesthesia reaction in self or family member [Family Member] : family member [Patient Declined  Services] : - None: Patient declined  services [FreeTextEntry1] : cataract [FreeTextEntry3] : DR. Socrates Mccall [FreeTextEntry4] : Use of ASA\par steriod use within the past 6 mo- no\par hx of bleeding disorders-no\par hx of blood clots-no\par hx of anesthesia reaction-no\par snoring.  has daytime sleepiness after lunch- epworth scale 5\par Fhx: sudden death-no\par         anesthesia reaction-no\par         clotting disorder-no\par         bleeding disorder-no\par \par walking up a 3 flight of stairs- CP or SOB. pt had gone to ER for dyspnea on exertion- pt was started on anti- HTN meds, DM meds\par walking 30 minutes- no CP or SOB\par DM- pt was chg to metformin, insulin 12 units before meal.  18 units of long acting insulin.  sometime hypoglycemia at bedtime\par asthma- last exaccerbation for 1 yr\par  [FreeTextEntry7] : 1/22/20- ECHO- EF 71%  tr MR, TR [FreeTextEntry2] : Sandra ( daughter) [TWNoteComboBox1] : Bo

## 2020-01-27 NOTE — PHYSICAL EXAM
[No Acute Distress] : no acute distress [Well Nourished] : well nourished [Well Developed] : well developed [Well-Appearing] : well-appearing [Normal Sclera/Conjunctiva] : normal sclera/conjunctiva [PERRL] : pupils equal round and reactive to light [Normal Outer Ear/Nose] : the outer ears and nose were normal in appearance [Normal Oropharynx] : the oropharynx was normal [Normal TMs] : both tympanic membranes were normal [No Lymphadenopathy] : no lymphadenopathy [Supple] : supple [Thyroid Normal, No Nodules] : the thyroid was normal and there were no nodules present [No Respiratory Distress] : no respiratory distress  [No Accessory Muscle Use] : no accessory muscle use [Clear to Auscultation] : lungs were clear to auscultation bilaterally [Normal Rate] : normal rate  [Regular Rhythm] : with a regular rhythm [Normal S1, S2] : normal S1 and S2 [No Murmur] : no murmur heard [No Carotid Bruits] : no carotid bruits [No Edema] : there was no peripheral edema [Soft] : abdomen soft [Non Tender] : non-tender [Non-distended] : non-distended [No Masses] : no abdominal mass palpated [Normal Bowel Sounds] : normal bowel sounds [Normal Posterior Cervical Nodes] : no posterior cervical lymphadenopathy [Normal Anterior Cervical Nodes] : no anterior cervical lymphadenopathy [Grossly Normal Strength/Tone] : grossly normal strength/tone [No Focal Deficits] : no focal deficits [Normal Gait] : normal gait [Deep Tendon Reflexes (DTR)] : deep tendon reflexes were 2+ and symmetric [Normal Affect] : the affect was normal [Normal Insight/Judgement] : insight and judgment were intact

## 2020-01-27 NOTE — PLAN
[FreeTextEntry1] : pt will decr pre dinner insulin to 10 units.  dec long acting insulin to 18- pt's daughter will call back w name of insulin\par EKG- NSR 83.  poss septal infarct.  inferior/ lat T wave chg- no chg c/o 1/3/20\par spirometry- nl FEV1/ FVC

## 2020-01-29 ENCOUNTER — RESULT REVIEW (OUTPATIENT)
Age: 65
End: 2020-01-29

## 2020-01-29 DIAGNOSIS — E55.9 VITAMIN D DEFICIENCY, UNSPECIFIED: ICD-10-CM

## 2020-01-29 LAB
25(OH)D3 SERPL-MCNC: 21.3 NG/ML
ALBUMIN SERPL ELPH-MCNC: 4.8 G/DL
ALP BLD-CCNC: 80 U/L
ALT SERPL-CCNC: 18 U/L
ANION GAP SERPL CALC-SCNC: 13 MMOL/L
AST SERPL-CCNC: 21 U/L
BASOPHILS # BLD AUTO: 0.03 K/UL
BASOPHILS NFR BLD AUTO: 0.4 %
BILIRUB SERPL-MCNC: 0.3 MG/DL
BUN SERPL-MCNC: 12 MG/DL
CALCIUM SERPL-MCNC: 11.2 MG/DL
CALCIUM SERPL-MCNC: 11.5 MG/DL
CHLORIDE SERPL-SCNC: 101 MMOL/L
CO2 SERPL-SCNC: 24 MMOL/L
CREAT SERPL-MCNC: 0.47 MG/DL
EOSINOPHIL # BLD AUTO: 0.05 K/UL
EOSINOPHIL NFR BLD AUTO: 0.7 %
ESTIMATED AVERAGE GLUCOSE: 237 MG/DL
GLUCOSE SERPL-MCNC: 117 MG/DL
HBA1C MFR BLD HPLC: 9.9 %
HCT VFR BLD CALC: 45.3 %
HGB BLD-MCNC: 13.9 G/DL
IMM GRANULOCYTES NFR BLD AUTO: 0.3 %
LYMPHOCYTES # BLD AUTO: 2.05 K/UL
LYMPHOCYTES NFR BLD AUTO: 26.8 %
MAN DIFF?: NORMAL
MCHC RBC-ENTMCNC: 26.6 PG
MCHC RBC-ENTMCNC: 30.7 GM/DL
MCV RBC AUTO: 86.6 FL
MONOCYTES # BLD AUTO: 0.51 K/UL
MONOCYTES NFR BLD AUTO: 6.7 %
NEUTROPHILS # BLD AUTO: 4.99 K/UL
NEUTROPHILS NFR BLD AUTO: 65.1 %
PARATHYROID HORMONE INTACT: 49 PG/ML
PHOSPHATE SERPL-MCNC: 3.7 MG/DL
PLATELET # BLD AUTO: 348 K/UL
POTASSIUM SERPL-SCNC: 4.4 MMOL/L
PROT SERPL-MCNC: 7.7 G/DL
RBC # BLD: 5.23 M/UL
RBC # FLD: 12.6 %
SAVE SPECIMEN: NORMAL
SODIUM SERPL-SCNC: 138 MMOL/L
TSH SERPL-ACNC: 1 UIU/ML
WBC # FLD AUTO: 7.65 K/UL

## 2020-01-29 RX ORDER — LOSARTAN POTASSIUM AND HYDROCHLOROTHIAZIDE 12.5; 5 MG/1; MG/1
50-12.5 TABLET ORAL
Refills: 0 | Status: DISCONTINUED | COMMUNITY
End: 2020-01-29

## 2020-01-30 ENCOUNTER — CLINICAL ADVICE (OUTPATIENT)
Age: 65
End: 2020-01-30

## 2020-01-30 ENCOUNTER — APPOINTMENT (OUTPATIENT)
Dept: ENDOCRINOLOGY | Facility: CLINIC | Age: 65
End: 2020-01-30
Payer: MEDICAID

## 2020-01-30 PROCEDURE — G0108 DIAB MANAGE TRN  PER INDIV: CPT

## 2020-01-30 PROCEDURE — 95250 CONT GLUC MNTR PHYS/QHP EQP: CPT

## 2020-01-30 PROCEDURE — 95251 CONT GLUC MNTR ANALYSIS I&R: CPT

## 2020-02-05 RX ORDER — ISOPROPYL ALCOHOL 0.7 ML/ML
SWAB TOPICAL
Qty: 90 | Refills: 11 | Status: ACTIVE | COMMUNITY
Start: 1900-01-01 | End: 1900-01-01

## 2020-02-06 DIAGNOSIS — E83.52 HYPERCALCEMIA: ICD-10-CM

## 2020-02-06 LAB
ANION GAP SERPL CALC-SCNC: 14 MMOL/L
BUN SERPL-MCNC: 12 MG/DL
C PEPTIDE SERPL-MCNC: 0.8 NG/ML
CALCIUM SERPL-MCNC: 11.1 MG/DL
CHLORIDE SERPL-SCNC: 104 MMOL/L
CO2 SERPL-SCNC: 24 MMOL/L
CREAT SERPL-MCNC: 0.5 MG/DL
FRUCTOSAMINE SERPL-MCNC: 308 UMOL/L
GLUCOSE SERPL-MCNC: 69 MG/DL
POTASSIUM SERPL-SCNC: 4.5 MMOL/L
SODIUM SERPL-SCNC: 142 MMOL/L

## 2020-02-10 RX ORDER — LANCETS
EACH MISCELLANEOUS
Qty: 100 | Refills: 11 | Status: DISCONTINUED | COMMUNITY
End: 2020-02-10

## 2020-02-10 RX ORDER — BLOOD-GLUCOSE METER
KIT MISCELLANEOUS
Qty: 100 | Refills: 11 | Status: ACTIVE | COMMUNITY
Start: 1900-01-01 | End: 1900-01-01

## 2020-02-11 RX ORDER — LANCETS 33 GAUGE
EACH MISCELLANEOUS
Qty: 2 | Refills: 3 | Status: ACTIVE | COMMUNITY
Start: 2020-02-11 | End: 1900-01-01

## 2020-02-20 ENCOUNTER — APPOINTMENT (OUTPATIENT)
Dept: INTERNAL MEDICINE | Facility: CLINIC | Age: 65
End: 2020-02-20

## 2020-03-03 ENCOUNTER — APPOINTMENT (OUTPATIENT)
Dept: CARDIOLOGY | Facility: CLINIC | Age: 65
End: 2020-03-03
Payer: MEDICAID

## 2020-03-03 PROCEDURE — 93320 DOPPLER ECHO COMPLETE: CPT

## 2020-03-03 PROCEDURE — 93351 STRESS TTE COMPLETE: CPT

## 2020-03-03 PROCEDURE — 93325 DOPPLER ECHO COLOR FLOW MAPG: CPT

## 2020-03-06 ENCOUNTER — APPOINTMENT (OUTPATIENT)
Dept: INTERNAL MEDICINE | Facility: CLINIC | Age: 65
End: 2020-03-06
Payer: MEDICAID

## 2020-03-06 VITALS
WEIGHT: 121 LBS | BODY MASS INDEX: 26.18 KG/M2 | TEMPERATURE: 98.1 F | SYSTOLIC BLOOD PRESSURE: 144 MMHG | DIASTOLIC BLOOD PRESSURE: 72 MMHG

## 2020-03-06 DIAGNOSIS — R07.89 OTHER CHEST PAIN: ICD-10-CM

## 2020-03-06 DIAGNOSIS — Z86.39 PERSONAL HISTORY OF OTHER ENDOCRINE, NUTRITIONAL AND METABOLIC DISEASE: ICD-10-CM

## 2020-03-06 DIAGNOSIS — H26.9 UNSPECIFIED CATARACT: ICD-10-CM

## 2020-03-06 DIAGNOSIS — Z01.818 ENCOUNTER FOR OTHER PREPROCEDURAL EXAMINATION: ICD-10-CM

## 2020-03-06 PROCEDURE — 99213 OFFICE O/P EST LOW 20 MIN: CPT

## 2020-03-26 RX ORDER — BLOOD SUGAR DIAGNOSTIC
STRIP MISCELLANEOUS 4 TIMES DAILY
Qty: 4 | Refills: 3 | Status: DISCONTINUED | COMMUNITY
Start: 2020-02-11 | End: 2020-03-26

## 2020-03-26 RX ORDER — BLOOD-GLUCOSE METER
W/DEVICE EACH MISCELLANEOUS
Qty: 1 | Refills: 0 | Status: DISCONTINUED | COMMUNITY
Start: 2020-02-11 | End: 2020-03-26

## 2020-04-02 ENCOUNTER — APPOINTMENT (OUTPATIENT)
Dept: ENDOCRINOLOGY | Facility: CLINIC | Age: 65
End: 2020-04-02

## 2020-05-06 ENCOUNTER — APPOINTMENT (OUTPATIENT)
Dept: ENDOCRINOLOGY | Facility: CLINIC | Age: 65
End: 2020-05-06

## 2020-05-06 PROBLEM — Z00.00 ENCOUNTER FOR PREVENTIVE HEALTH EXAMINATION: Status: ACTIVE | Noted: 2020-05-06

## 2020-06-12 ENCOUNTER — RX RENEWAL (OUTPATIENT)
Age: 65
End: 2020-06-12

## 2020-07-06 RX ORDER — BLOOD SUGAR DIAGNOSTIC
STRIP MISCELLANEOUS 4 TIMES DAILY
Qty: 100 | Refills: 11 | Status: ACTIVE | COMMUNITY
Start: 1900-01-01 | End: 1900-01-01

## 2020-07-14 NOTE — ED PROVIDER NOTE - NS ED MD EM SELECTION
Pt prepped for procedure. No complaints at this time will continue to monitor.    [id number] Comprehensive

## 2020-07-15 NOTE — PATIENT PROFILE ADULT - INFORMATION PROVIDED TO:
Left detailed message (per Snapshot) regarding below. Clinic number given for any further questions. patient/support person

## 2020-07-20 ENCOUNTER — RX RENEWAL (OUTPATIENT)
Age: 65
End: 2020-07-20

## 2020-07-27 ENCOUNTER — RX RENEWAL (OUTPATIENT)
Age: 65
End: 2020-07-27

## 2020-08-07 ENCOUNTER — RX RENEWAL (OUTPATIENT)
Age: 65
End: 2020-08-07

## 2020-08-23 ENCOUNTER — RX RENEWAL (OUTPATIENT)
Age: 65
End: 2020-08-23

## 2020-09-02 ENCOUNTER — RX RENEWAL (OUTPATIENT)
Age: 65
End: 2020-09-02

## 2020-11-04 ENCOUNTER — RX RENEWAL (OUTPATIENT)
Age: 65
End: 2020-11-04

## 2020-12-01 ENCOUNTER — APPOINTMENT (OUTPATIENT)
Dept: INTERNAL MEDICINE | Facility: CLINIC | Age: 65
End: 2020-12-01

## 2021-01-21 ENCOUNTER — APPOINTMENT (OUTPATIENT)
Dept: INTERNAL MEDICINE | Facility: CLINIC | Age: 66
End: 2021-01-21
Payer: MEDICAID

## 2021-01-21 PROCEDURE — 99072 ADDL SUPL MATRL&STAF TM PHE: CPT

## 2021-01-25 LAB
25(OH)D3 SERPL-MCNC: 32.7 NG/ML
ALBUMIN SERPL ELPH-MCNC: 4.3 G/DL
ALP BLD-CCNC: 102 U/L
ALT SERPL-CCNC: 15 U/L
ANION GAP SERPL CALC-SCNC: 10 MMOL/L
AST SERPL-CCNC: 16 U/L
BASOPHILS # BLD AUTO: 0.03 K/UL
BASOPHILS NFR BLD AUTO: 0.6 %
BILIRUB SERPL-MCNC: 0.4 MG/DL
BUN SERPL-MCNC: 9 MG/DL
CALCIUM SERPL-MCNC: 9.8 MG/DL
CHLORIDE SERPL-SCNC: 105 MMOL/L
CHOLEST SERPL-MCNC: 154 MG/DL
CO2 SERPL-SCNC: 25 MMOL/L
CREAT SERPL-MCNC: 0.51 MG/DL
CREAT SPEC-SCNC: 87 MG/DL
EOSINOPHIL # BLD AUTO: 0.05 K/UL
EOSINOPHIL NFR BLD AUTO: 1 %
ESTIMATED AVERAGE GLUCOSE: 258 MG/DL
GLUCOSE SERPL-MCNC: 230 MG/DL
HBA1C MFR BLD HPLC: 10.6 %
HCT VFR BLD CALC: 43.9 %
HDLC SERPL-MCNC: 53 MG/DL
HGB BLD-MCNC: 13.8 G/DL
IMM GRANULOCYTES NFR BLD AUTO: 0.2 %
LDLC SERPL CALC-MCNC: 90 MG/DL
LYMPHOCYTES # BLD AUTO: 1.5 K/UL
LYMPHOCYTES NFR BLD AUTO: 28.8 %
MAN DIFF?: NORMAL
MCHC RBC-ENTMCNC: 27.3 PG
MCHC RBC-ENTMCNC: 31.4 GM/DL
MCV RBC AUTO: 86.9 FL
MICROALBUMIN 24H UR DL<=1MG/L-MCNC: 2 MG/DL
MICROALBUMIN/CREAT 24H UR-RTO: 23 MG/G
MONOCYTES # BLD AUTO: 0.34 K/UL
MONOCYTES NFR BLD AUTO: 6.5 %
NEUTROPHILS # BLD AUTO: 3.27 K/UL
NEUTROPHILS NFR BLD AUTO: 62.9 %
NONHDLC SERPL-MCNC: 101 MG/DL
PLATELET # BLD AUTO: 295 K/UL
POTASSIUM SERPL-SCNC: 4.4 MMOL/L
PROT SERPL-MCNC: 7.2 G/DL
RBC # BLD: 5.05 M/UL
RBC # FLD: 11.9 %
SAVE SPECIMEN: NORMAL
SODIUM SERPL-SCNC: 140 MMOL/L
T4 FREE SERPL-MCNC: 1.3 NG/DL
TRIGL SERPL-MCNC: 55 MG/DL
TSH SERPL-ACNC: 1.23 UIU/ML
WBC # FLD AUTO: 5.2 K/UL

## 2021-01-26 RX ORDER — INSULIN ASPART 100 [IU]/ML
100 INJECTION, SOLUTION INTRAVENOUS; SUBCUTANEOUS
Qty: 1 | Refills: 0 | Status: DISCONTINUED | COMMUNITY
Start: 2021-01-22 | End: 2021-01-26

## 2021-01-28 ENCOUNTER — NON-APPOINTMENT (OUTPATIENT)
Age: 66
End: 2021-01-28

## 2021-01-28 ENCOUNTER — APPOINTMENT (OUTPATIENT)
Dept: INTERNAL MEDICINE | Facility: CLINIC | Age: 66
End: 2021-01-28
Payer: MEDICAID

## 2021-01-28 VITALS
SYSTOLIC BLOOD PRESSURE: 156 MMHG | HEIGHT: 57 IN | OXYGEN SATURATION: 98 % | BODY MASS INDEX: 25.03 KG/M2 | WEIGHT: 116 LBS | HEART RATE: 84 BPM | DIASTOLIC BLOOD PRESSURE: 80 MMHG | TEMPERATURE: 98 F

## 2021-01-28 DIAGNOSIS — Z12.31 ENCOUNTER FOR SCREENING MAMMOGRAM FOR MALIGNANT NEOPLASM OF BREAST: ICD-10-CM

## 2021-01-28 DIAGNOSIS — Z23 ENCOUNTER FOR IMMUNIZATION: ICD-10-CM

## 2021-01-28 DIAGNOSIS — J45.20 MILD INTERMITTENT ASTHMA, UNCOMPLICATED: ICD-10-CM

## 2021-01-28 PROCEDURE — 90715 TDAP VACCINE 7 YRS/> IM: CPT

## 2021-01-28 PROCEDURE — 99396 PREV VISIT EST AGE 40-64: CPT | Mod: 25

## 2021-01-28 PROCEDURE — 90732 PPSV23 VACC 2 YRS+ SUBQ/IM: CPT

## 2021-01-28 PROCEDURE — 99072 ADDL SUPL MATRL&STAF TM PHE: CPT

## 2021-01-28 PROCEDURE — G0009: CPT

## 2021-01-28 PROCEDURE — 90472 IMMUNIZATION ADMIN EACH ADD: CPT

## 2021-01-28 PROCEDURE — 93000 ELECTROCARDIOGRAM COMPLETE: CPT

## 2021-01-28 RX ORDER — LOSARTAN POTASSIUM 100 MG/1
100 TABLET, FILM COATED ORAL DAILY
Qty: 30 | Refills: 0 | Status: DISCONTINUED | COMMUNITY
Start: 2020-01-29 | End: 2021-01-28

## 2021-01-28 RX ORDER — METFORMIN HYDROCHLORIDE 500 MG/1
500 TABLET, COATED ORAL
Qty: 1 | Refills: 2 | Status: DISCONTINUED | COMMUNITY
End: 2021-01-28

## 2021-01-28 NOTE — HISTORY OF PRESENT ILLNESS
[FreeTextEntry1] : CPE [de-identified] : pt accompanied by her Sandra hoyos, who was historian as well \par nl stress ECHO 3/2020. , WECHO 1/2020\par vaccine- rec tdap , pneum 23 - pt never had these vaccine as per her daughter\par diet- non compliant w diet\par exercise- walking\par DM- non complinant\par CAD- no CP or SOB\par HTN- not taking her BP meds\par swelling ankle\par hearing prob/ tinnitus\par GERD-no . no abd pain\par lunp in neck- no \par no swelling of ankles\par lipid- compliant w meds\par reviewed labs 1/21/21 labs. - hgA1c 10.6\par

## 2021-01-28 NOTE — PLAN
[FreeTextEntry1] : DM- discussed DM diet w pt.  pt to incr novolog 14 u, basalgar 18 units\par tdap , pneum 23 given\par pt will go to pharmacy for flu vac\par wait 2 wk prior to getting COVID vac\par EKG- JSNR 75 .  diffuse T wave inversion- no chg c/o 1/27/20

## 2021-01-28 NOTE — PHYSICAL EXAM
[No Acute Distress] : no acute distress [Well Nourished] : well nourished [Well Developed] : well developed [Well-Appearing] : well-appearing [Normal Sclera/Conjunctiva] : normal sclera/conjunctiva [PERRL] : pupils equal round and reactive to light [Normal Outer Ear/Nose] : the outer ears and nose were normal in appearance complains of pain/discomfort [Normal Oropharynx] : the oropharynx was normal [No Lymphadenopathy] : no lymphadenopathy [Supple] : supple [Thyroid Normal, No Nodules] : the thyroid was normal and there were no nodules present [No Respiratory Distress] : no respiratory distress  [No Accessory Muscle Use] : no accessory muscle use [Clear to Auscultation] : lungs were clear to auscultation bilaterally [Normal Rate] : normal rate  [Regular Rhythm] : with a regular rhythm [Normal S1, S2] : normal S1 and S2 [No Murmur] : no murmur heard [No Carotid Bruits] : no carotid bruits [Pedal Pulses Present] : the pedal pulses are present [No Edema] : there was no peripheral edema [Non Tender] : non-tender [Soft] : abdomen soft [Non-distended] : non-distended [Normal Bowel Sounds] : normal bowel sounds [Normal Supraclavicular Nodes] : no supraclavicular lymphadenopathy [Normal Axillary Nodes] : no axillary lymphadenopathy [Normal Posterior Cervical Nodes] : no posterior cervical lymphadenopathy [Normal Anterior Cervical Nodes] : no anterior cervical lymphadenopathy [Normal Inguinal Nodes] : no inguinal lymphadenopathy [Grossly Normal Strength/Tone] : grossly normal strength/tone [No Focal Deficits] : no focal deficits [Normal Gait] : normal gait [Normal Affect] : the affect was normal [Normal Insight/Judgement] : insight and judgment were intact [Normal Appearance] : normal in appearance [No Masses] : no palpable masses [No Axillary Lymphadenopathy] : no axillary lymphadenopathy [Right Foot Was Examined] : Right foot ~C was examined [Left Foot Was Examined] : left foot ~C was examined [None] : no ulcers in either foot were found [] : normal [TWNoteComboBox6] : +2 [TWNoteComboBox5] : +2

## 2021-01-28 NOTE — HEALTH RISK ASSESSMENT
[Never (0 pts)] : Never (0 points) [No] : In the past 12 months have you used drugs other than those required for medical reasons? No [0] : 2) Feeling down, depressed, or hopeless: Not at all (0) [HIV test declined] : HIV test declined [Hepatitis C test declined] : Hepatitis C test declined [With Patient/Caregiver] : With Patient/Caregiver [Designated Healthcare Proxy] : Designated healthcare proxy [Relationship: ___] : Relationship: [unfilled] [] : No [Audit-CScore] : 0 [MammogramDate] : 01/20 [MammogramComments] : nl as pe rpt's daughter [ColonoscopyComments] : never had colonoscopy [AdvancecareDate] : 01/21

## 2021-01-31 ENCOUNTER — RX RENEWAL (OUTPATIENT)
Age: 66
End: 2021-01-31

## 2021-02-02 ENCOUNTER — RX RENEWAL (OUTPATIENT)
Age: 66
End: 2021-02-02

## 2021-02-13 ENCOUNTER — APPOINTMENT (OUTPATIENT)
Dept: INTERNAL MEDICINE | Facility: CLINIC | Age: 66
End: 2021-02-13
Payer: MEDICAID

## 2021-02-13 VITALS
WEIGHT: 118 LBS | OXYGEN SATURATION: 99 % | BODY MASS INDEX: 25.46 KG/M2 | DIASTOLIC BLOOD PRESSURE: 90 MMHG | HEIGHT: 57 IN | SYSTOLIC BLOOD PRESSURE: 142 MMHG | TEMPERATURE: 97.1 F | HEART RATE: 70 BPM

## 2021-02-13 DIAGNOSIS — L03.119 CELLULITIS OF UNSPECIFIED PART OF LIMB: ICD-10-CM

## 2021-02-13 DIAGNOSIS — M79.673 PAIN IN UNSPECIFIED FOOT: ICD-10-CM

## 2021-02-13 PROCEDURE — 99072 ADDL SUPL MATRL&STAF TM PHE: CPT

## 2021-02-13 PROCEDURE — 99213 OFFICE O/P EST LOW 20 MIN: CPT

## 2021-02-13 NOTE — PHYSICAL EXAM
[No Acute Distress] : no acute distress [Well Nourished] : well nourished [Well Developed] : well developed [Pedal Pulses Present] : the pedal pulses are present [Normal Mood] : the mood was normal [de-identified] : right foot plantar external mild cellulitis

## 2021-02-13 NOTE — HISTORY OF PRESENT ILLNESS
[FreeTextEntry8] : A Pts daughter c/o right foot pain ,redness and swelling for few days. No fever,chills.Denies numbness or tingling sensation. [Family Member] : family member

## 2021-02-25 ENCOUNTER — APPOINTMENT (OUTPATIENT)
Dept: ENDOCRINOLOGY | Facility: CLINIC | Age: 66
End: 2021-02-25

## 2021-03-11 ENCOUNTER — RX RENEWAL (OUTPATIENT)
Age: 66
End: 2021-03-11

## 2021-03-14 ENCOUNTER — RX RENEWAL (OUTPATIENT)
Age: 66
End: 2021-03-14

## 2021-03-14 RX ORDER — PEN NEEDLE, DIABETIC 29 G X1/2"
32G X 4 MM NEEDLE, DISPOSABLE MISCELLANEOUS
Qty: 100 | Refills: 11 | Status: ACTIVE | COMMUNITY
Start: 2021-03-14 | End: 1900-01-01

## 2021-05-10 ENCOUNTER — RX RENEWAL (OUTPATIENT)
Age: 66
End: 2021-05-10

## 2021-07-11 ENCOUNTER — RX RENEWAL (OUTPATIENT)
Age: 66
End: 2021-07-11

## 2021-08-06 ENCOUNTER — RX RENEWAL (OUTPATIENT)
Age: 66
End: 2021-08-06

## 2021-08-17 LAB
ALBUMIN SERPL ELPH-MCNC: 4.2 G/DL
ALP BLD-CCNC: 91 U/L
ALT SERPL-CCNC: 14 U/L
ANION GAP SERPL CALC-SCNC: 11 MMOL/L
AST SERPL-CCNC: 17 U/L
BILIRUB SERPL-MCNC: 0.5 MG/DL
BUN SERPL-MCNC: 13 MG/DL
CALCIUM SERPL-MCNC: 10.1 MG/DL
CHLORIDE SERPL-SCNC: 108 MMOL/L
CHOLEST SERPL-MCNC: 149 MG/DL
CO2 SERPL-SCNC: 24 MMOL/L
CREAT SERPL-MCNC: 0.65 MG/DL
ESTIMATED AVERAGE GLUCOSE: 160 MG/DL
GLUCOSE SERPL-MCNC: 146 MG/DL
HBA1C MFR BLD HPLC: 7.2 %
HDLC SERPL-MCNC: 57 MG/DL
LDLC SERPL CALC-MCNC: 79 MG/DL
LDLC SERPL DIRECT ASSAY-MCNC: 76 MG/DL
NONHDLC SERPL-MCNC: 92 MG/DL
POTASSIUM SERPL-SCNC: 4.4 MMOL/L
PROT SERPL-MCNC: 6.6 G/DL
SODIUM SERPL-SCNC: 143 MMOL/L
TRIGL SERPL-MCNC: 65 MG/DL

## 2021-10-05 ENCOUNTER — APPOINTMENT (OUTPATIENT)
Dept: INTERNAL MEDICINE | Facility: CLINIC | Age: 66
End: 2021-10-05

## 2021-10-20 ENCOUNTER — RX RENEWAL (OUTPATIENT)
Age: 66
End: 2021-10-20

## 2021-10-31 ENCOUNTER — RX RENEWAL (OUTPATIENT)
Age: 66
End: 2021-10-31

## 2021-10-31 RX ORDER — LANCETS
EACH MISCELLANEOUS
Qty: 100 | Refills: 11 | Status: ACTIVE | COMMUNITY
Start: 2021-10-31 | End: 1900-01-01

## 2021-11-01 RX ORDER — LANCETS 33 GAUGE
EACH MISCELLANEOUS
Qty: 100 | Refills: 11 | Status: ACTIVE | COMMUNITY
Start: 2020-02-10 | End: 1900-01-01

## 2021-11-08 ENCOUNTER — RX RENEWAL (OUTPATIENT)
Age: 66
End: 2021-11-08

## 2021-11-11 ENCOUNTER — APPOINTMENT (OUTPATIENT)
Dept: INTERNAL MEDICINE | Facility: CLINIC | Age: 66
End: 2021-11-11

## 2021-12-02 ENCOUNTER — NON-APPOINTMENT (OUTPATIENT)
Age: 66
End: 2021-12-02

## 2022-01-08 ENCOUNTER — RX RENEWAL (OUTPATIENT)
Age: 67
End: 2022-01-08

## 2022-02-22 ENCOUNTER — RX RENEWAL (OUTPATIENT)
Age: 67
End: 2022-02-22

## 2022-02-25 ENCOUNTER — RX RENEWAL (OUTPATIENT)
Age: 67
End: 2022-02-25

## 2023-09-01 ENCOUNTER — APPOINTMENT (OUTPATIENT)
Age: 68
End: 2023-09-01
Payer: COMMERCIAL

## 2023-09-01 PROCEDURE — D4342: CPT

## 2023-10-31 RX ORDER — ASPIRIN/CALCIUM CARB/MAGNESIUM 324 MG
75 TABLET ORAL
Qty: 0 | Refills: 0 | DISCHARGE

## 2023-10-31 RX ORDER — LOSARTAN/HYDROCHLOROTHIAZIDE 100MG-25MG
1 TABLET ORAL
Qty: 0 | Refills: 0 | DISCHARGE

## 2023-10-31 RX ORDER — INSULIN GLARGINE 100 [IU]/ML
5 INJECTION, SOLUTION SUBCUTANEOUS
Qty: 0 | Refills: 0 | DISCHARGE

## 2023-10-31 RX ORDER — NEBIVOLOL HYDROCHLORIDE 5 MG/1
1 TABLET ORAL
Qty: 0 | Refills: 0 | DISCHARGE

## 2023-12-14 PROBLEM — H26.9 UNSPECIFIED CATARACT: Chronic | Status: ACTIVE | Noted: 2020-01-11

## 2023-12-14 PROBLEM — E11.9 TYPE 2 DIABETES MELLITUS WITHOUT COMPLICATIONS: Chronic | Status: ACTIVE | Noted: 2020-01-11

## 2023-12-14 PROBLEM — M19.90 UNSPECIFIED OSTEOARTHRITIS, UNSPECIFIED SITE: Chronic | Status: ACTIVE | Noted: 2020-01-11

## 2023-12-14 PROBLEM — I10 ESSENTIAL (PRIMARY) HYPERTENSION: Chronic | Status: ACTIVE | Noted: 2020-01-11

## 2023-12-14 PROBLEM — I25.10 ATHEROSCLEROTIC HEART DISEASE OF NATIVE CORONARY ARTERY WITHOUT ANGINA PECTORIS: Chronic | Status: ACTIVE | Noted: 2020-01-11

## 2023-12-14 PROBLEM — E78.5 HYPERLIPIDEMIA, UNSPECIFIED: Chronic | Status: ACTIVE | Noted: 2020-01-11

## 2023-12-31 PROBLEM — Z23 NEED FOR 23-POLYVALENT PNEUMOCOCCAL POLYSACCHARIDE VACCINE: Status: ACTIVE | Noted: 2021-01-28

## 2024-02-03 ENCOUNTER — INPATIENT (INPATIENT)
Facility: HOSPITAL | Age: 69
LOS: 3 days | Discharge: HOME | DRG: 989 | End: 2024-02-07
Attending: STUDENT IN AN ORGANIZED HEALTH CARE EDUCATION/TRAINING PROGRAM | Admitting: STUDENT IN AN ORGANIZED HEALTH CARE EDUCATION/TRAINING PROGRAM
Payer: MEDICAID

## 2024-02-03 VITALS
HEART RATE: 68 BPM | SYSTOLIC BLOOD PRESSURE: 202 MMHG | OXYGEN SATURATION: 99 % | WEIGHT: 139.99 LBS | TEMPERATURE: 98 F | HEIGHT: 60 IN | DIASTOLIC BLOOD PRESSURE: 117 MMHG | RESPIRATION RATE: 20 BRPM

## 2024-02-03 DIAGNOSIS — I25.10 ATHEROSCLEROTIC HEART DISEASE OF NATIVE CORONARY ARTERY WITHOUT ANGINA PECTORIS: ICD-10-CM

## 2024-02-03 DIAGNOSIS — Z90.49 ACQUIRED ABSENCE OF OTHER SPECIFIED PARTS OF DIGESTIVE TRACT: Chronic | ICD-10-CM

## 2024-02-03 DIAGNOSIS — I10 ESSENTIAL (PRIMARY) HYPERTENSION: ICD-10-CM

## 2024-02-03 DIAGNOSIS — Z29.9 ENCOUNTER FOR PROPHYLACTIC MEASURES, UNSPECIFIED: ICD-10-CM

## 2024-02-03 DIAGNOSIS — Z90.710 ACQUIRED ABSENCE OF BOTH CERVIX AND UTERUS: Chronic | ICD-10-CM

## 2024-02-03 DIAGNOSIS — R55 SYNCOPE AND COLLAPSE: ICD-10-CM

## 2024-02-03 DIAGNOSIS — N63.0 UNSPECIFIED LUMP IN UNSPECIFIED BREAST: ICD-10-CM

## 2024-02-03 DIAGNOSIS — E78.5 HYPERLIPIDEMIA, UNSPECIFIED: ICD-10-CM
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
## 2024-02-03 DIAGNOSIS — E11.9 TYPE 2 DIABETES MELLITUS WITHOUT COMPLICATIONS: ICD-10-CM

## 2024-02-03 LAB
ALBUMIN SERPL ELPH-MCNC: 4.3 G/DL — SIGNIFICANT CHANGE UP (ref 3.3–5)
ALP SERPL-CCNC: 113 U/L — SIGNIFICANT CHANGE UP (ref 40–120)
ALT FLD-CCNC: 17 U/L — SIGNIFICANT CHANGE UP (ref 10–45)
ANION GAP SERPL CALC-SCNC: 13 MMOL/L — SIGNIFICANT CHANGE UP (ref 5–17)
APTT BLD: 27.9 SEC — SIGNIFICANT CHANGE UP (ref 24.5–35.6)
AST SERPL-CCNC: 21 U/L — SIGNIFICANT CHANGE UP (ref 10–40)
BASE EXCESS BLDV CALC-SCNC: 1.2 MMOL/L — SIGNIFICANT CHANGE UP (ref -2–3)
BASOPHILS # BLD AUTO: 0.03 K/UL — SIGNIFICANT CHANGE UP (ref 0–0.2)
BASOPHILS NFR BLD AUTO: 0.3 % — SIGNIFICANT CHANGE UP (ref 0–2)
BILIRUB SERPL-MCNC: 0.5 MG/DL — SIGNIFICANT CHANGE UP (ref 0.2–1.2)
BUN SERPL-MCNC: 10 MG/DL — SIGNIFICANT CHANGE UP (ref 7–23)
CALCIUM SERPL-MCNC: 10.5 MG/DL — SIGNIFICANT CHANGE UP (ref 8.4–10.5)
CHLORIDE SERPL-SCNC: 100 MMOL/L — SIGNIFICANT CHANGE UP (ref 96–108)
CO2 BLDV-SCNC: 27 MMOL/L — HIGH (ref 22–26)
CO2 SERPL-SCNC: 23 MMOL/L — SIGNIFICANT CHANGE UP (ref 22–31)
CREAT SERPL-MCNC: 0.51 MG/DL — SIGNIFICANT CHANGE UP (ref 0.5–1.3)
EGFR: 103 ML/MIN/1.73M2 — SIGNIFICANT CHANGE UP
EOSINOPHIL # BLD AUTO: 0.09 K/UL — SIGNIFICANT CHANGE UP (ref 0–0.5)
EOSINOPHIL NFR BLD AUTO: 0.9 % — SIGNIFICANT CHANGE UP (ref 0–6)
ETHANOL SERPL-MCNC: <10 MG/DL — SIGNIFICANT CHANGE UP (ref 0–10)
GAS PNL BLDV: SIGNIFICANT CHANGE UP
GLUCOSE BLDC GLUCOMTR-MCNC: 124 MG/DL — HIGH (ref 70–99)
GLUCOSE SERPL-MCNC: 257 MG/DL — HIGH (ref 70–99)
HCO3 BLDV-SCNC: 26 MMOL/L — SIGNIFICANT CHANGE UP (ref 22–29)
HCT VFR BLD CALC: 44 % — SIGNIFICANT CHANGE UP (ref 34.5–45)
HGB BLD-MCNC: 14 G/DL — SIGNIFICANT CHANGE UP (ref 11.5–15.5)
IMM GRANULOCYTES NFR BLD AUTO: 0.4 % — SIGNIFICANT CHANGE UP (ref 0–0.9)
INR BLD: 1.01 RATIO — SIGNIFICANT CHANGE UP (ref 0.85–1.18)
LACTATE SERPL-SCNC: 1.9 MMOL/L — SIGNIFICANT CHANGE UP (ref 0.5–2)
LIDOCAIN IGE QN: 25 U/L — SIGNIFICANT CHANGE UP (ref 7–60)
LYMPHOCYTES # BLD AUTO: 1.28 K/UL — SIGNIFICANT CHANGE UP (ref 1–3.3)
LYMPHOCYTES # BLD AUTO: 13.3 % — SIGNIFICANT CHANGE UP (ref 13–44)
MCHC RBC-ENTMCNC: 26 PG — LOW (ref 27–34)
MCHC RBC-ENTMCNC: 31.8 GM/DL — LOW (ref 32–36)
MCV RBC AUTO: 81.6 FL — SIGNIFICANT CHANGE UP (ref 80–100)
MONOCYTES # BLD AUTO: 0.51 K/UL — SIGNIFICANT CHANGE UP (ref 0–0.9)
MONOCYTES NFR BLD AUTO: 5.3 % — SIGNIFICANT CHANGE UP (ref 2–14)
NEUTROPHILS # BLD AUTO: 7.66 K/UL — HIGH (ref 1.8–7.4)
NEUTROPHILS NFR BLD AUTO: 79.8 % — HIGH (ref 43–77)
NRBC # BLD: 0 /100 WBCS — SIGNIFICANT CHANGE UP (ref 0–0)
PCO2 BLDV: 41 MMHG — SIGNIFICANT CHANGE UP (ref 39–42)
PH BLDV: 7.41 — SIGNIFICANT CHANGE UP (ref 7.32–7.43)
PLATELET # BLD AUTO: 274 K/UL — SIGNIFICANT CHANGE UP (ref 150–400)
PO2 BLDV: 73 MMHG — HIGH (ref 25–45)
POTASSIUM SERPL-MCNC: 4 MMOL/L — SIGNIFICANT CHANGE UP (ref 3.5–5.3)
POTASSIUM SERPL-SCNC: 4 MMOL/L — SIGNIFICANT CHANGE UP (ref 3.5–5.3)
PROT SERPL-MCNC: 7.3 G/DL — SIGNIFICANT CHANGE UP (ref 6–8.3)
PROTHROM AB SERPL-ACNC: 11.1 SEC — SIGNIFICANT CHANGE UP (ref 9.5–13)
RBC # BLD: 5.39 M/UL — HIGH (ref 3.8–5.2)
RBC # FLD: 12.9 % — SIGNIFICANT CHANGE UP (ref 10.3–14.5)
SAO2 % BLDV: 94 % — HIGH (ref 67–88)
SODIUM SERPL-SCNC: 136 MMOL/L — SIGNIFICANT CHANGE UP (ref 135–145)
TROPONIN T, HIGH SENSITIVITY RESULT: 12 NG/L — SIGNIFICANT CHANGE UP (ref 0–51)
WBC # BLD: 9.61 K/UL — SIGNIFICANT CHANGE UP (ref 3.8–10.5)
WBC # FLD AUTO: 9.61 K/UL — SIGNIFICANT CHANGE UP (ref 3.8–10.5)

## 2024-02-03 PROCEDURE — 74174 CTA ABD&PLVS W/CONTRAST: CPT | Mod: 26,MA

## 2024-02-03 PROCEDURE — 72170 X-RAY EXAM OF PELVIS: CPT | Mod: 26

## 2024-02-03 PROCEDURE — 72125 CT NECK SPINE W/O DYE: CPT | Mod: 26,MA

## 2024-02-03 PROCEDURE — 70486 CT MAXILLOFACIAL W/O DYE: CPT | Mod: 26,MA

## 2024-02-03 PROCEDURE — 70496 CT ANGIOGRAPHY HEAD: CPT | Mod: 26,MA

## 2024-02-03 PROCEDURE — 73562 X-RAY EXAM OF KNEE 3: CPT | Mod: 26,RT

## 2024-02-03 PROCEDURE — 71275 CT ANGIOGRAPHY CHEST: CPT | Mod: 26,MA

## 2024-02-03 PROCEDURE — 71045 X-RAY EXAM CHEST 1 VIEW: CPT | Mod: 26

## 2024-02-03 PROCEDURE — 73110 X-RAY EXAM OF WRIST: CPT | Mod: 26,LT

## 2024-02-03 PROCEDURE — 70450 CT HEAD/BRAIN W/O DYE: CPT | Mod: 26,59,MA

## 2024-02-03 PROCEDURE — 99223 1ST HOSP IP/OBS HIGH 75: CPT | Mod: GC

## 2024-02-03 PROCEDURE — 12011 RPR F/E/E/N/L/M 2.5 CM/<: CPT

## 2024-02-03 PROCEDURE — 99285 EMERGENCY DEPT VISIT HI MDM: CPT | Mod: 25

## 2024-02-03 PROCEDURE — 70498 CT ANGIOGRAPHY NECK: CPT | Mod: 26,MA

## 2024-02-03 PROCEDURE — 73120 X-RAY EXAM OF HAND: CPT | Mod: 26,LT

## 2024-02-03 RX ORDER — ACETAMINOPHEN 500 MG
650 TABLET ORAL ONCE
Refills: 0 | Status: COMPLETED | OUTPATIENT
Start: 2024-02-03 | End: 2024-02-03

## 2024-02-03 RX ORDER — GLUCAGON INJECTION, SOLUTION 0.5 MG/.1ML
1 INJECTION, SOLUTION SUBCUTANEOUS ONCE
Refills: 0 | Status: DISCONTINUED | OUTPATIENT
Start: 2024-02-03 | End: 2024-02-07

## 2024-02-03 RX ORDER — LOSARTAN POTASSIUM 100 MG/1
50 TABLET, FILM COATED ORAL DAILY
Refills: 0 | Status: DISCONTINUED | OUTPATIENT
Start: 2024-02-03 | End: 2024-02-04

## 2024-02-03 RX ORDER — DEXTROSE 50 % IN WATER 50 %
25 SYRINGE (ML) INTRAVENOUS ONCE
Refills: 0 | Status: DISCONTINUED | OUTPATIENT
Start: 2024-02-03 | End: 2024-02-07

## 2024-02-03 RX ORDER — ATORVASTATIN CALCIUM 80 MG/1
20 TABLET, FILM COATED ORAL AT BEDTIME
Refills: 0 | Status: DISCONTINUED | OUTPATIENT
Start: 2024-02-03 | End: 2024-02-07

## 2024-02-03 RX ORDER — DEXTROSE 50 % IN WATER 50 %
15 SYRINGE (ML) INTRAVENOUS ONCE
Refills: 0 | Status: DISCONTINUED | OUTPATIENT
Start: 2024-02-03 | End: 2024-02-07

## 2024-02-03 RX ORDER — SODIUM CHLORIDE 9 MG/ML
250 INJECTION INTRAMUSCULAR; INTRAVENOUS; SUBCUTANEOUS ONCE
Refills: 0 | Status: COMPLETED | OUTPATIENT
Start: 2024-02-03 | End: 2024-02-03

## 2024-02-03 RX ORDER — SODIUM CHLORIDE 9 MG/ML
1000 INJECTION, SOLUTION INTRAVENOUS
Refills: 0 | Status: DISCONTINUED | OUTPATIENT
Start: 2024-02-03 | End: 2024-02-07

## 2024-02-03 RX ORDER — INSULIN GLARGINE 100 [IU]/ML
18 INJECTION, SOLUTION SUBCUTANEOUS
Refills: 0 | DISCHARGE

## 2024-02-03 RX ORDER — ACETAMINOPHEN 500 MG
975 TABLET ORAL ONCE
Refills: 0 | Status: COMPLETED | OUTPATIENT
Start: 2024-02-03 | End: 2024-02-03

## 2024-02-03 RX ORDER — TETANUS TOXOID, REDUCED DIPHTHERIA TOXOID AND ACELLULAR PERTUSSIS VACCINE, ADSORBED 5; 2.5; 8; 8; 2.5 [IU]/.5ML; [IU]/.5ML; UG/.5ML; UG/.5ML; UG/.5ML
0.5 SUSPENSION INTRAMUSCULAR ONCE
Refills: 0 | Status: DISCONTINUED | OUTPATIENT
Start: 2024-02-03 | End: 2024-02-03

## 2024-02-03 RX ORDER — INSULIN LISPRO 100/ML
12 VIAL (ML) SUBCUTANEOUS
Refills: 0 | Status: DISCONTINUED | OUTPATIENT
Start: 2024-02-03 | End: 2024-02-07

## 2024-02-03 RX ORDER — INSULIN LISPRO 100/ML
12 VIAL (ML) SUBCUTANEOUS
Refills: 0 | DISCHARGE

## 2024-02-03 RX ORDER — LIDOCAINE 4 G/100G
1 CREAM TOPICAL ONCE
Refills: 0 | Status: COMPLETED | OUTPATIENT
Start: 2024-02-03 | End: 2024-02-03

## 2024-02-03 RX ORDER — DEXTROSE 50 % IN WATER 50 %
12.5 SYRINGE (ML) INTRAVENOUS ONCE
Refills: 0 | Status: DISCONTINUED | OUTPATIENT
Start: 2024-02-03 | End: 2024-02-07

## 2024-02-03 RX ORDER — INSULIN GLARGINE 100 [IU]/ML
18 INJECTION, SOLUTION SUBCUTANEOUS AT BEDTIME
Refills: 0 | Status: DISCONTINUED | OUTPATIENT
Start: 2024-02-03 | End: 2024-02-07

## 2024-02-03 RX ORDER — INSULIN LISPRO 100/ML
VIAL (ML) SUBCUTANEOUS AT BEDTIME
Refills: 0 | Status: DISCONTINUED | OUTPATIENT
Start: 2024-02-03 | End: 2024-02-07

## 2024-02-03 RX ORDER — INSULIN LISPRO 100/ML
VIAL (ML) SUBCUTANEOUS
Refills: 0 | Status: DISCONTINUED | OUTPATIENT
Start: 2024-02-03 | End: 2024-02-07

## 2024-02-03 RX ADMIN — SODIUM CHLORIDE 250 MILLILITER(S): 9 INJECTION INTRAMUSCULAR; INTRAVENOUS; SUBCUTANEOUS at 12:43

## 2024-02-03 RX ADMIN — Medication 975 MILLIGRAM(S): at 14:59

## 2024-02-03 RX ADMIN — SODIUM CHLORIDE 250 MILLILITER(S): 9 INJECTION INTRAMUSCULAR; INTRAVENOUS; SUBCUTANEOUS at 13:40

## 2024-02-03 RX ADMIN — LIDOCAINE 1 APPLICATION(S): 4 CREAM TOPICAL at 18:12

## 2024-02-03 RX ADMIN — Medication 975 MILLIGRAM(S): at 12:43

## 2024-02-03 RX ADMIN — LOSARTAN POTASSIUM 50 MILLIGRAM(S): 100 TABLET, FILM COATED ORAL at 18:20

## 2024-02-03 NOTE — ED PROVIDER NOTE - CLINICAL SUMMARY MEDICAL DECISION MAKING FREE TEXT BOX
69 yo f HLD DM on insulin here for unwitnessed syncope, unknown LOC, not on AC, w preceding dizziness. injuries including abrasion above r eye, blood beneath nares w 1.5 cm laceration through and through does not cross vermilion border, L wrist pain and R knee abrasion. no focal deficits on exam but persistent dizziness, and given /80 w a systolic delta >20 on b/l UE aand syncope kristin get CT to r/o dissection/eval for carotid stenosis. bedside US limited due to habitus but no clear aortic flap or dilation of root, no pericardial effusion. will also give analgesia and recheck BP

## 2024-02-03 NOTE — H&P ADULT - PROBLEM SELECTOR PLAN 1
On admission, /117 at 12PM, 190/76 at 6PM.     Plan:   -c/w home Losartan 50mg daily  - Lower BP by no more than 25% of MAP within first few hours - EKG reviewed and showed ST and T-wave changes, troponin 12, less concern for ACS  - CT head showed multifocal atherosclerotic disease without evidence of acute infarcts, less likely contributing to fall  - neurology following,    Plan:   -Fall precautions   -Orthostatic vital signs  -Pending MRI brain   -Upon dc, followup with Dr. Garcia or Neurology resident clinic -Presented with fall on face, pt does not recall how she fall.  - EKG reviewed and showed ST and T-wave changes, troponin 12, less concern for ACS  - CT head showed multifocal atherosclerotic disease without evidence of acute infarcts, less likely contributing to fall  - neurology following,    Plan:   -Fall precautions   -Orthostatic vital signs  -Monitor on telemetry  -check TTE to r/o cardiac causes of syncope   -Pending MRI brain to r/o stroke  -Upon dc, followup with Dr. Garcia or Neurology resident clinic

## 2024-02-03 NOTE — H&P ADULT - NSHPSOCIALHISTORY_GEN_ALL_CORE
Lives at home with daughter, does not require assistance devices, climbs stairs.   Denies alcohol use, smoking, drug use.

## 2024-02-03 NOTE — ED PROCEDURE NOTE - PROCEDURE ADDITIONAL DETAILS
pt had through and through lip laceration, interior repaired w/ 4 absorbable sutures, exterior repaired w/ 1 absorbable suture

## 2024-02-03 NOTE — ED ADULT NURSE REASSESSMENT NOTE - STATUS
pt and daughter aware/awaiting bed, no change
pt and daughter made aware of admission and now waiting for inpt bed assignment/awaiting bed, no change

## 2024-02-03 NOTE — CONSULT NOTE ADULT - ATTENDING COMMENTS
I reviewed available diagnostic studies, examined pt, and reviewed images personally. I agree with resident & fellow 's history, exam, orders placed, and plan of care. I reviewed available diagnostic studies, examined pt, and reviewed images personally. I agree with resident & fellow 's history, exam, orders placed, and plan of care. Service provided on 2/4/2024.

## 2024-02-03 NOTE — ED PROCEDURE NOTE - NS_EDPROVIDERDISPOUSERTYPE_ED_A_ED
Attending Attestation (For Attendings USE Only)...
abdominal pain
Attending Attestation (For Attendings USE Only)...

## 2024-02-03 NOTE — ED ADULT NURSE REASSESSMENT NOTE - ANCILLARY STATUS
MRI- daughter filling out MRI form with pt/awaiting radiology
MRI forms completed and faxed to MRI by ed cristopher rep./awaiting radiology

## 2024-02-03 NOTE — H&P ADULT - PROBLEM SELECTOR PLAN 6
Diet: DASH/Diabetes Plan:  -c/w home Atorvastatin 20mg daily  -Check lipid panel in the AM  -Consider ASA 81 for stroke risk reduction

## 2024-02-03 NOTE — H&P ADULT - NSICDXPASTSURGICALHX_GEN_ALL_CORE_FT
Patient is currently doing good on alprazolam 0.5 mg daily, would recommend decreasing to alprazolam 0.25 mg daily as needed.   Will fill a prescription for 30 days at this dosing however if he feels that he needs to go back up or use 2 pills daily as neede PAST SURGICAL HISTORY:  S/P cholecystectomy     S/P hysterectomy

## 2024-02-03 NOTE — ED ADULT NURSE NOTE - OBJECTIVE STATEMENT
Pt presents with daughter who states "she was out on her daily walk by herself and fell. She was able to get herself home after. No-one was with her but she said that everything around her started spinning before she fell and passed out. pain to face, left wrist/hand and both knees. "

## 2024-02-03 NOTE — H&P ADULT - PROBLEM SELECTOR PLAN 3
Plan:  -c/w Lantus 18u qhs  -c/w Admelog 12u TID  -Low ISS  -A1c in the AM On admission, /117 at 12PM, 190/76 at 6PM.     Plan:   -c/w home Losartan 50mg daily  - Lower BP by no more than 25% of MAP within first few hours

## 2024-02-03 NOTE — ED PROVIDER NOTE - ATTENDING CONTRIBUTION TO CARE
Nickolas Dior DO: I have personally performed a face to face medical and diagnostic evaluation of the patient. I have discussed with and reviewed the Resident's and/or ACP's and/or Medical/PA/NP student's note and agree with the History, ROS, Physical Exam and MDM unless otherwise indicated. A brief summary of my personal evaluation and impression can be found below.     hpi above     CONSTITUTIONAL: nad  SKIN: Warm dry, normal skin turgor  HEAD: r forehad abrasion, abrasion under R nare  EYES: EOMI, PERRLA, no scleral icterus, conjunctiva pink  ENT: blood in pharynx from superior through and through lip laceration <1 cm anterior, no loose dentition, mild maxillary trauma, R orbital tenderness w/ no ecchymosis/proptosis  NECK: Supple; non tender. Full ROM.  CARD: RRR, no murmurs, wide pulse pressure  RESP: clear to ausculation b/l. No crackles or wheezing.  ABD: soft, non-tender, non-distended, no rebound or guarding.  MSK: LUE wrist pain, R knee abrasion, tenderness Full ROM, no pedal edema, no calf tenderness  NEURO: normal motor. normal sensory  PSYCH: Cooperative, appropriate.     68f hx dm on insulin, tn compliant w. meds p/w unwitnessed syncpe, still having lightheaded pressure 220.80 in RUE, 200/80 in LUE, pt's systoc bp out of proportion to level of pain, likely not aortic dissection but will evaluate w/ ct given persistent hypertension, reported neuro sxs. will also evaluate for vessel dz w/ unwitnessed syncope, eval ct head for ich, ct neck and max face givn pt's facial trauma, if no facial fx will repair lip laceration, check for infectious or metabolic precipitant of pt's symptoms, check ua, chest xr, check labs, pt was able to ambulate earlier. Pt had adacel in jan 2021, does not require another at this time. Pt has no cp, at this time, no sob, states her fall was preceded by dizziness. ekg abnormal but c/w prior which has diffuse t wave inversions, less likley primarily cardiac in origin but w/ preceding sxs check troponin. dispo pending imaging and symptom resolution. will xr lue and R knee which is giving her pain at htis time. seizure not likely given no tongue laceration, no seizure hx and no tongue laceration.

## 2024-02-03 NOTE — ED ADULT NURSE NOTE - NSFALLRISKINTERV_ED_ALL_ED

## 2024-02-03 NOTE — ED PROVIDER NOTE - PATIENT PORTAL LINK FT
You can access the FollowMyHealth Patient Portal offered by [facility] by registering at the following website: [email or web address]. By joining [facility]’s FollowMyHealth portal, you will also be able to view your health information using other applications (apps) compatible with our system.

## 2024-02-03 NOTE — ED PROVIDER NOTE - PHYSICAL EXAMINATION
GENERAL: well appearing in no acute distress, non-toxic appearing  HEAD: normocephalic, atraumatic  HEENT: no dental malocclusions, 1.5 cm laceration superior filtrum  through and through  CARDIAC: regular rate and rhythm, normal S1S2, no appreciable murmurs, 2+ pulses in UE/LE b/l  PULM: normal breath sounds, clear to ascultation bilaterally, no rales, rhonchi, wheezing  GI: abdomen nondistended, soft, nontender, no guarding, rebound tenderness  NEURO: no focal motor or sensory deficits pupils 2mm equal and reactive, CN2-12 intact, normal speech, AAOx3. neurovascularly intact bl UE LE  MSK: no c/t/l spinal tenderness no step off or deformity, no chest wall or rib tenderness, 5/5 strength b/l UE LE. pain L wrist and scaphoid no overlying deformity   SKIN: abrasion to R supraorbital region, 1.5 laceration and blood beneath nares, no septal hematoma, abrasion R knee

## 2024-02-03 NOTE — H&P ADULT - PROBLEM SELECTOR PLAN 7
CT chest showed: Rounded asymmetric LEFT breast density measuring 1.3 cm in diameter.     Plan:   -Mammography is recommended for further evaluation, f/u with outpatient PCP.

## 2024-02-03 NOTE — H&P ADULT - ATTENDING COMMENTS
68 F w/PMHx of DM2, CAD, HTN, HLD, arthritis presents post  fall c/b facial trauma , no prodromal symptoms , unclear LOC , A&ox 3 , no focal neurologic deficits , labs w/ no major metabolic derangements , mild leukocytosis , ekg w/ twis , head imaging w/ no acute findings , some atherosclerosis.  presentation concerning for cardiogenic syncope , would monitor on telemetry and obtain echocardiogram , Patient also evaluated by Neurology in ED  will follow recommendations .

## 2024-02-03 NOTE — H&P ADULT - PROBLEM SELECTOR PLAN 5
Plan:  -c/w home Atorvastatin 20mg daily  -Check lipid panel in the AM  -Consider ASA 81 for stroke risk reduction hx of CAD  see HLD plan

## 2024-02-03 NOTE — H&P ADULT - ASSESSMENT
68 year old F with PMHx of DM2, CAD, HTN, HLD, arthritis presented with unwitnessed fall on 2/3. CT imaging showed multifocal atherosclertic narrowing without evidence of acute infarct, likely not contributing to fall. Vitals significant for hypertension to 202/117,  Admitted for hypertensive management and pending MRI  68 year old F with PMHx of DM2, CAD, HTN, HLD, arthritis presented with unwitnessed fall on 2/3. CT imaging showed multifocal atherosclertic narrowing without evidence of acute infarct, likely not contributing to fall. No focal neurological deficits on exam. Vitals significant for hypertension to 202/117,  Admitted for hypertensive management and pending MRI  68 year old F with PMHx of DM2, CAD, HTN, HLD, arthritis presented with unwitnessed fall on 2/3. CT imaging showed multifocal atherosclerotic narrowing without evidence of acute infarct, likely not contributing to fall. No focal neurological deficits on exam. Vitals significant for hypertension to 202/117,  Admitted for syncope workup, r/o cardiac causes vs stroke.

## 2024-02-03 NOTE — H&P ADULT - TIME BILLING
Chart review , case discussion with ED provider , obtain translated history via assistance of family ,  examination of patient , answering questions and concerns , ordering labs and medications , and documentation

## 2024-02-03 NOTE — H&P ADULT - NSHPLABSRESULTS_GEN_ALL_CORE
Personally reviewed labs, imaging, ekg                           14.0   9.61  )-----------( 274      ( 03 Feb 2024 12:49 )             44.0       02-03    136  |  100  |  10  ----------------------------<  257<H>  4.0   |  23  |  0.51    Ca    10.5      03 Feb 2024 12:49    TPro  7.3  /  Alb  4.3  /  TBili  0.5  /  DBili  x   /  AST  21  /  ALT  17  /  AlkPhos  113  02-03              Urinalysis Basic - ( 03 Feb 2024 12:49 )    Color: x / Appearance: x / SG: x / pH: x  Gluc: 257 mg/dL / Ketone: x  / Bili: x / Urobili: x   Blood: x / Protein: x / Nitrite: x   Leuk Esterase: x / RBC: x / WBC x   Sq Epi: x / Non Sq Epi: x / Bacteria: x        PT/INR - ( 03 Feb 2024 12:49 )   PT: 11.1 sec;   INR: 1.01 ratio         PTT - ( 03 Feb 2024 12:49 )  PTT:27.9 sec    Lactate Trend  02-03 @ 12:49 Lactate:1.9             CAPILLARY BLOOD GLUCOSE      POCT Blood Glucose.: 124 mg/dL (03 Feb 2024 18:24)            EKG: Personally reviewed labs, imaging, ekg                           14.0   9.61  )-----------( 274      ( 03 Feb 2024 12:49 )             44.0       02-03    136  |  100  |  10  ----------------------------<  257<H>  4.0   |  23  |  0.51    Ca    10.5      03 Feb 2024 12:49    TPro  7.3  /  Alb  4.3  /  TBili  0.5  /  DBili  x   /  AST  21  /  ALT  17  /  AlkPhos  113  02-03              Urinalysis Basic - ( 03 Feb 2024 12:49 )    Color: x / Appearance: x / SG: x / pH: x  Gluc: 257 mg/dL / Ketone: x  / Bili: x / Urobili: x   Blood: x / Protein: x / Nitrite: x   Leuk Esterase: x / RBC: x / WBC x   Sq Epi: x / Non Sq Epi: x / Bacteria: x        PT/INR - ( 03 Feb 2024 12:49 )   PT: 11.1 sec;   INR: 1.01 ratio         PTT - ( 03 Feb 2024 12:49 )  PTT:27.9 sec    Lactate Trend  02-03 @ 12:49 Lactate:1.9             CAPILLARY BLOOD GLUCOSE      POCT Blood Glucose.: 124 mg/dL (03 Feb 2024 18:24)      IMPRESSION:    INTRACRANIAL CTA:  No large vessel occlusion.  However there is multifocal atherosclerotic narrowing, most severe   focally of the right A2, moderately at right P1-2, and mild to moderate   involving both middle cerebral arteries, vertebral arteries and left PCA.    EXTRACRANIAL CTA:  No arterial steno-occlusive disease despite mild plaque. No evidence of   dissection.    HEAD CT:  No acute intracranial hemorrhage or calvarial fracture.    FACIAL CT:  No acute fracture, intraorbital hemorrhage or radiopaque foreign body.    CERVICAL SPINE CT:  No acute fracture or traumatic malalignment.  Disc degeneration at C5-6.      IMPRESSION:  No evidence of aortic dissection, thrombosis, or aneurysm.

## 2024-02-03 NOTE — ED PROVIDER NOTE - NSFOLLOWUPINSTRUCTIONS_ED_ALL_ED_FT
You were seen in the Emergency Department for fall. You got blood work, an EKG, x-rays and CT which were within normal limits. All of your results are included in your discharge paperwork.     Follow up with your Primary Doctor within 2-3 days.    Return with any headache, chest pain, shortness of breath, palpitations, or any other concerning symptoms.    Your sutures in your lip are absorbable and will dissolve on their own.

## 2024-02-03 NOTE — CONSULT NOTE ADULT - NSCONSULTADDITIONALINFOA_GEN_ALL_CORE
Neurovascular Fellow Attestation    68F with unwitnessed fall while walking. PMH of HTN, HLD, DM, CAD, arthritis, cataracts. CTH with microvascular ischemic changes. CTA with multifocal atherosclerosis, most prominently, right A2, and bilateral MCA and PCA. Neurologically nonfocal. Given the patient's clinical history, and examination, imaging findings is likely secondary to long standing poorly controlled vascular risk factors. A1c 11.7. However, given unclear nature of fall, and no other etiology to be discover, could be secondary to arthritis vs neurovascular insult and would recommend starting aspirin 81mg and MRI brain to further characterize any potential infarcts.    Mack Hinkle  Neurovascular Fellow

## 2024-02-03 NOTE — ED PROVIDER NOTE - OBJECTIVE STATEMENT
69 yo f hx HLD DM on insulin presents for unwitnessed fall. patient was walking this am, felt room spinning dizziness and fell on her face. denies any other preceding symptoms including chest pain, SOB, weakness. unknown if LOC pt does not remember event. was able to get up on her own and walk back to her house with her daughter. has facial abrasion above R eye, blood on upper lip, and pain to L wrist and R knee w abrasion. on arrival FS>200, /84 HR 88 afebrile 100% on RA. on arrival endorsing persistent dizziness, no other focal deficits. denies vision change, headache, chest pain, SOB.

## 2024-02-03 NOTE — H&P ADULT - NSHPREVIEWOFSYSTEMS_GEN_ALL_CORE
REVIEW OF SYSTEMS:    CONSTITUTIONAL: No weakness, fevers or chills  EYES: No visual changes; no sclera icterics, no pain or drainage  ENT:  No vertigo or throat pain   NECK: No pain or stiffness  RESPIRATORY: No cough, wheezing, hemoptysis; No shortness of breath  CARDIOVASCULAR: No chest pain or palpitations  GASTROINTESTINAL: No abdominal or epigastric pain. No nausea, vomiting, or hematemesis; No diarrhea or constipation. No melena or hematochezia.  GENITOURINARY: No dysuria, frequency or hematuria  NEUROLOGICAL: No numbness or weakness. No headache  MSK: + arm and knee pain  SKIN: + facial skin abrasions from fall

## 2024-02-03 NOTE — CONSULT NOTE ADULT - ASSESSMENT
Vitals notable for ***. Labs notable for ***. CT imaging ***. On exam: *** nystagmus, test of skew ***, HIT ***, Fukada: ***, Babinski-Weil: ***.    NIHSS:  LKN:  pre-MRS:    Impression:    Recommendations INCOMPLETE:  [] Admit to ***  [] Please obtain orthostatic vital signs if not already obtained  [] MRI brain w/wo contrast (please order with IAC protocol)  [] A1c, lipid panel  [] Patient can continue meclizine if they find it beneficial  [] Please ensure proper hydration  [] Vitals, neurochecks q4h  [] Fall precautions  [] PT/OT  [] DVT prophylaxis per primary    No tenecteplase d/t outside therapeutic window.  No MT d/t no LVO.    Patient d/w  ***. To be seen by attending in the AM with attestation to follow. Recommendations were relayed directly to ***. Note delayed d/t emergent patient care. 68-year-old woman, with PMH significant for T2DM, CAD, HLD, HTN, arthritis, who presented to [facility] ED on 2/3/2024, with c/o unwitnessed fall. Neurology consulted for abnormal CTA findings. No LOC. No dizziness/nausea/vomiting. No c/o neurologic symptoms.    ED vitals notable for HR 68, BP to 202/117, RR 20, SpO2 97-99% on RA. Labs notable for glucose 257. CT imaging as above. On exam: NO nystagmus, test of skew negative, NO appendicular ataxia, no dysarthria, no diplopia. Non-focal neurologic exam.    Impression: Likely incidental finding of multifocal atherosclerotic narrowing of the intracranial circulation; nonfocal neurologic exam with no evidence of acute infarct on CTH; patient denies any dizziness, walks unassisted, HINTS exam is reassuring    Recommendations:  [] Please obtain orthostatic vital signs if not already obtained  [] Fall precautions  [] A1c, lipid panel (LDL goal <70)  [] Can start aspirin 81mg daily if no medical contraindication for stroke risk reduction  [] Home atorvastatin should be titrated by PCP to LDL <70  [] Ongoing glycemic control (A1c goal <7%)  [] BP goal - should work with PCP to slowly bring down BP to a SBP <140  [] Upon discharge, patient should follow up promptly with Dr. Garcia: [phone] [address] [place] [address] or if has Medicaid - may follow up with Neurology resident clinic on 4 [facility] at [address] [place] [address] ([phone]) - outpatient MRI can be considered at that time if appropriate    No tenecteplase d/t outside therapeutic window.  No MT d/t no LVO.    Patient d/w stroke fellow, Dr. REINIER Hinkle, under the supervision of attending vascular neurologist Dr. Bey. Recommendations were relayed directly to the ED. 68-year-old woman, with PMH significant for T2DM, CAD, HLD, HTN, arthritis, who presented to [facility] ED on 2/3/2024, with c/o unwitnessed fall. Neurology consulted for abnormal CTA findings. No LOC. No dizziness/nausea/vomiting. No c/o neurologic symptoms.    ED vitals notable for HR 68, BP to 202/117, RR 20, SpO2 97-99% on RA. Labs notable for glucose 257. CT imaging as above. On exam: NO nystagmus, test of skew negative, NO appendicular ataxia, no dysarthria, no diplopia. Non-focal neurologic exam.    Impression: Likely incidental finding of multifocal atherosclerotic narrowing of the intracranial circulation; nonfocal neurologic exam with no evidence of acute infarct on CTH; patient denies any dizziness, walks unassisted, HINTS exam is reassuring    Recommendations:  [] Please obtain orthostatic vital signs if not already obtained  [] Fall precautions  [] A1c, lipid panel (LDL goal <70)  [] Can start aspirin 81mg daily if no medical contraindication for stroke risk reduction  [] Home atorvastatin should be titrated by PCP to LDL <70  [] Ongoing glycemic control (A1c goal <7%)  [] BP goal - should work with PCP to slowly bring down BP to a SBP <140  [] Upon discharge, patient should follow up promptly with Dr. Garcia: [phone] [address] [place] [address] or if has Medicaid - may follow up with Neurology resident clinic on 4 [facility] at [address] [place] [address] ([phone]) - outpatient MRI can be considered at that time if appropriate    Patient d/w stroke fellow, Dr. REINIER Hinkle, under the supervision of attending vascular neurologist Dr. Bey. Recommendations were relayed directly to the ED.

## 2024-02-03 NOTE — ED PROVIDER NOTE - PROGRESS NOTE DETAILS
Ct head/neck CAP negative for intracranial ,cervical spine or aortic pathology. xray negative for fracture. lip laceration repaired w 5.0 absorbable gut. repeat vitals and dc. Ct head/neck CAP negative for intracranial ,cervical spine or aortic pathology CT angio head showing narrowing at [place] and posterior circulation, consistent w symptoms. will consult neurology    . xray negative for fracture. lip laceration repaired w 5.0 absorbable gut. Nickolas Dior DO:Patient's lip laceration repaired, patient has multifocal intracranial stenosis in the setting of dizziness, concern for possible arterial stenosis causing symptoms, will consult neurology to follow recommendations.  Patient's blood pressure improving but still elevated at this time.

## 2024-02-03 NOTE — ED ADULT NURSE REASSESSMENT NOTE - COMFORT CARE
plan of care explained/wait time explained
plan of care explained/wait time explained
meal provided/plan of care explained/wait time explained

## 2024-02-03 NOTE — ED ADULT NURSE NOTE - CHIEF COMPLAINT QUOTE
Unwitnessed fall while walking outside, felt "lightheaded" prior to fall. + LOC. Was able to ambulate back home after fall. C/O face and mouth pain, bilateral upper and lower extremities. Abrasions on forehead, nose. Denies blood thinners.

## 2024-02-03 NOTE — ED ADULT NURSE NOTE - PLAN OF CARE
[Normocephalic] : normocephalic [Atraumatic] : atraumatic [No Supraclavicular Adenopathy] : no supraclavicular adenopathy [No dominant masses] : no dominant masses in right breast  [No dominant masses] : no dominant masses left breast [No Rashes] : no rashes [No Ulceration] : no ulceration [de-identified] : well healed surgical scars.\par s/p bilateral mastectomy; no reconstruction. [de-identified] : No axillary lymphadenopathy appreciated. [de-identified] : No axillary lymphadenopathy appreciated. Call bell/Explanation of exam/test/Fall precautions/Side rails

## 2024-02-03 NOTE — H&P ADULT - NSHPPHYSICALEXAM_GEN_ALL_CORE
T(C): 36.3 (02-03-24 @ 18:12), Max: 36.9 (02-03-24 @ 11:51)  HR: 67 (02-03-24 @ 18:12) (67 - 88)  BP: 190/76 (02-03-24 @ 18:12) (190/76 - 223/84)  RR: 19 (02-03-24 @ 18:12) (18 - 20)  SpO2: 98% (02-03-24 @ 18:12) (97% - 100%)    CONSTITUTIONAL: Well groomed, no apparent distress  EYES: PERRLA and symmetric, EOMI, No conjunctival or scleral injection, non-icteric  ENMT: Dried blood in nares. Oral mucosa with moist membranes. Normal dentition; no pharyngeal injection or exudates  NECK: Supple, symmetric and without tracheal deviation   RESP: No respiratory distress, no use of accessory muscles; CTA b/l, no WRR  CV: RRR, +S1S2, no MRG; no JVD; no peripheral edema  GI: Soft, NT, ND, no rebound, no guarding; no palpable masses; no CVA tenderness   MSK: full ROM b/l UE and LE.   SKIN: facial lacerations, sutured right periorbital side, bilateral knee abrasions and palm abrasions.   NEURO: no focal neurological deficits,  A+O x 3,

## 2024-02-03 NOTE — ED ADULT NURSE NOTE - NURSING SKIN WOUND LOCATION #2
nose, rt lateral eye/temporal area/ rt knee nose, rt side face/cheek/ rt knee/rt and left fingers and hands

## 2024-02-03 NOTE — CONSULT NOTE ADULT - SUBJECTIVE AND OBJECTIVE BOX
Neurology - Consult Note    -  Spectra: [phone] ([facility]), [phone] ([facility]). For new consults, please page: [phone] ([facility]), [phone] ([facility]).  -    HPI: Patient DAVID FOSTER is a 68y (1955) *** handed wo/man who presented to *** ED on ***, with c/o ***.    PMH significant for: ***    Review of Systems:  INCOMPLETE   All other review of systems is negative unless indicated above.    Allergies:  No Known Allergies      PMHx/PSHx/Family Hx: As above, otherwise see below   HTN (hypertension)    HLD (hyperlipidemia)    CAD (coronary artery disease)    Diabetes mellitus    Cataract    Arthritis        Social Hx:  No current use of tobacco, alcohol, or illicit drugs  Lives with ***    Medications:  MEDICATIONS  (STANDING):    MEDICATIONS  (PRN):      Vitals:  T(C): 36.6 (02-03-24 @ 14:58), Max: 36.9 (02-03-24 @ 11:51)  HR: 77 (02-03-24 @ 14:58) (68 - 88)  BP: 191/78 (02-03-24 @ 14:58) (191/78 - 223/84)  RR: 18 (02-03-24 @ 14:58) (18 - 20)  SpO2: 97% (02-03-24 @ 14:58) (97% - 100%)    Physical Examination: INCOMPLETE  General - Sitting up on ED cart  Cardiovascular - No LE edema  Eyes - Fundoscopy not performed due to safety precautions in the setting of infection risk, non-injected conjunctiva, anicteric sclera    Neurologic Exam:  Mental status:  - Awake, Alert  - Oriented to: person, place, and time  - Speech: fluent  - Repetition and naming: intact   - Follows simple and complex commands   - Attention/concentration: intact  - Recent and remote memory (including registration and recall): registration intact, 3/3 on 3-word recall  - Fund of knowledge: intact    Cranial nerves - PERRL, VFF on confrontational testing, EOMI - no nystagmus, face sensation (V1-V3) intact b/l, facial strength intact without asymmetry b/l, hearing intact b/l with finger rub test, palate with symmetric elevation, shoulder shrug intact b/l, tongue midline on protrusion with full lateral movement    Motor - Normal bulk and tone throughout. No pronator drift.  Strength testing (R/L)  Deltoid:  5/5  Biceps:  5/5        Triceps:  5/5       Wrist Extension:  5/5      Wrist Flexion:  5/5       Interossei:  5/5        :  5/5    Hip Flexion:  5/5  Hip Extension:  5/5      Knee Flexion:  5/5      Knee Extension:  5/5      Dorsiflexion:  5/5      Plantar Flexion:  5/5    Sensation - Light touch/vibration intact throughout    DTRs (R/L)  Biceps:  2+/2+        Triceps:  2+/2+       Brachioradialis:  2+/2+        Patellar:  2+/2+      Ankle:  2+/2+      Plantar response:  Down/Down    Coordination - Finger to Nose intact b/l. No tremors appreciated.    Gait and station - Did not assess d/t fall risk/safety concerns.    Labs:                        14.0   9.61  )-----------( 274      ( 03 Feb 2024 12:49 )             44.0     02-03    136  |  100  |  10  ----------------------------<  257<H>  4.0   |  23  |  0.51    Ca    10.5      03 Feb 2024 12:49    TPro  7.3  /  Alb  4.3  /  TBili  0.5  /  DBili  x   /  AST  21  /  ALT  17  /  AlkPhos  113  02-03    CAPILLARY BLOOD GLUCOSE      POCT Blood Glucose.: 225 mg/dL (03 Feb 2024 12:32)    LIVER FUNCTIONS - ( 03 Feb 2024 12:49 )  Alb: 4.3 g/dL / Pro: 7.3 g/dL / ALK PHOS: 113 U/L / ALT: 17 U/L / AST: 21 U/L / GGT: x             PT/INR - ( 03 Feb 2024 12:49 )   PT: 11.1 sec;   INR: 1.01 ratio         PTT - ( 03 Feb 2024 12:49 )  PTT:27.9 sec  CSF:                  Radiology:  CT Head No Cont:  (03 Feb 2024 14:25)     Neurology - Consult Note    -  Spectra: [phone] ([facility]), [phone] ([facility]). For new consults, please page: [phone] ([facility]), [phone] ([facility]).  -    HPI: Patient DAVID FOSTER is a 68y (1955) woman who presented to [facility] ED on 2/3/2024, with c/o unwitnessed fall.    PMH significant for: T2DM, CAD, HLD, HTN, arthritis, cataracts    Accompanied by her daughter - who provides Mong[place]ian translation. Patient speaks some English. Patient in her usual state of health this AM. Patient was out for her morning walk when she suddenly fell - sustaining lacerations to the R side of her face, above her lip, and her nose. C/o pain in her face and hands. Patient completely denies LOC. Patient not sure how she fell - just ended up on the ground. Patient sat up and rested for a few minutes on the ground before heading back home. Walked home by herself. Daughter noted injuries upon arrival - brought her to the ED. Patient rec'd trauma scans in the ED - no acute finding of fracture or hemorrhage. Does have a LEFT breast mass - 1.3cm in diameter. CTA head reveals multifocal atherosclerotic narrowing, most severe focally of the right A2, moderately at right P1-2, and mild to moderate involving both middle cerebral arteries, vertebral arteries and left PCA - for which Neurology is consulted. Patient denies any dizziness, N/V. Says she was NEVER dizzy during this event or in the aftermath. Denies diplopia, changes in her vision. Has pain in her face (and hands) from the fall. Denies neck pain. Patient, in the past, has had intermittent LH d/t low blood sugar (225 on arrival).     Per daughter - patient is walking as she normally does - just a bit more cautiously. Speech is NOT slurred, but a bit slower. Patient has fallen once in the past - 2 years ago, similar story - was walking and had a mechanical fall. Does not check her BP at home. Was at the endocrinologist last week - reportedly BP was OK then (but measurement is not known). Denies history of stroke/MI. Not on any blood thinners. No assistive devices at baseline. Climbs stairs. Does not drive. No substance use. Lives with her daughter. On insulin (meal and basal), losartan, and atorvastatin.      Review of Systems:  All other review of systems is negative unless indicated above.    Allergies:  No Known Allergies      PMHx/PSHx/Family Hx: As above, otherwise see below   HTN (hypertension)    HLD (hyperlipidemia)    CAD (coronary artery disease)    Diabetes mellitus    Cataract    Arthritis        Social Hx:  Per HPI    Medications:  MEDICATIONS  (STANDING):    MEDICATIONS  (PRN):      Vitals:  T(C): 36.6 (02-03-24 @ 14:58), Max: 36.9 (02-03-24 @ 11:51)  HR: 77 (02-03-24 @ 14:58) (68 - 88)  BP: 191/78 (02-03-24 @ 14:58) (191/78 - 223/84)  RR: 18 (02-03-24 @ 14:58) (18 - 20)  SpO2: 97% (02-03-24 @ 14:58) (97% - 100%)    Physical Examination:  General - Lying on her left side, in bed, casually dressed  Cardiovascular - No LE edema    Neurologic Exam:  Mental status:  - Awake, Alert  - Oriented to: person, place (hospital), and time (month/year)  - Speech: fluent  - Repetition and naming: intact   - Follows simple and complex commands     Cranial nerves - pupils are surgical b/l, VFF on confrontational testing, EOMI - no nystagmus, face sensation (V1-V3) intact b/l, facial strength intact without asymmetry b/l, hearing grossly intact, palate with symmetric elevation, shoulder shrug intact b/l, tongue midline on protrusion with full lateral movement  No dysarthria    Motor - Normal bulk and tone throughout. No pronator drift.  Strength testing (R/L)  Deltoid:  5/5  Biceps:  5/5        Triceps:  5/5       :  5/5    Fine finger movement symmetrical b/l, did not test wrist extension/flexion/interossei d/t patient c/o pain in her hands from fall    Hip Flexion:  5/5  Hip Extension:  5/5      Knee Flexion:  5/5      Knee Extension:  5/5      Dorsiflexion:  5/5      Plantar Flexion:  5/5    Sensation - Light touch intact throughout    DTRs (R/L)  Deferred d/t focused neurologic exam    Coordination - Finger to Nose, Heel to Shin intact b/l. No tremors appreciated.    Gait and station - Romberg negative. Cautious gait - walks unassisted.    HINTS: No nystagmus, test of skew is negative, HIT deferred for patient comfort d/t patient NOT dizzy    Labs:                        14.0   9.61  )-----------( 274      ( 03 Feb 2024 12:49 )             44.0     02-03    136  |  100  |  10  ----------------------------<  257<H>  4.0   |  23  |  0.51    Ca    10.5      03 Feb 2024 12:49    TPro  7.3  /  Alb  4.3  /  TBili  0.5  /  DBili  x   /  AST  21  /  ALT  17  /  AlkPhos  113  02-03    CAPILLARY BLOOD GLUCOSE      POCT Blood Glucose.: 225 mg/dL (03 Feb 2024 12:32)    LIVER FUNCTIONS - ( 03 Feb 2024 12:49 )  Alb: 4.3 g/dL / Pro: 7.3 g/dL / ALK PHOS: 113 U/L / ALT: 17 U/L / AST: 21 U/L / GGT: x             PT/INR - ( 03 Feb 2024 12:49 )   PT: 11.1 sec;   INR: 1.01 ratio         PTT - ( 03 Feb 2024 12:49 )  PTT:27.9 sec  CSF:                  Radiology:  HEAD CT:    There is age-appropriate parenchymal volume without hydrocephalus.   Symmetric arachnoid cyst formation noted at superior parietal convexity.   No midline shift or extra-axial hemorrhage collection.    There is no acute parenchymal hemorrhage, and gray-white matter   differentiation appears preserved without evidence of recent   transcortical infarct. Mild white matter microangiopathy is suspected.    On bony window inspection, there is no calvarial fracture. The   tympanomastoid cavities are well-aerated.    MAXILLOFACIAL CT:    The bony orbits are intact without fracture. Soft tissue evaluation of   the orbits shows no retrobulbar hemorrhage, mass effect or asymmetry of   the globes. No radiopaque foreign body. Ocular lenses are replaced.    The mandible is intact without TMJ dislocation.    The nasal bones, zygomatic arches, pterygoid plates and skull base are   intact.    There is mild scattered mucosal thickening in the paranasal sinuses   without fluid level.    CERVICAL SPINE CT:    There is nonspecific straightening of cervical lordosis. No   spondylolisthesis, facet subluxation or malalignment at the   craniovertebral or atlantoaxial articulations.    There is no prevertebral swelling or other acute extraspinal soft tissue   abnormality.    The vertebral bodies are preserved in height and density. No acute or   displaced fracture.    There is mild intervertebral disc height loss at the C5-6 level with   small posterior osteophyte complex. No significant spinal stenosis.   Uncinate spurring results and right asymmetric neural foraminal   narrowing; correlate for right C6 radiculopathy. No large disc herniation   visualized. Facet arthrosis left asymmetric at C3-4.    CTA Head/Neck:  INTRACRANIAL:  The internal carotid arteries are patent at the skull base and   intracranial compartment without occlusion or high grade stenosis.    Anterior and middle cerebral arteries are patent. No branch occlusion.   However, there is moderate narrowing of the distal MCA M1 segments and   proximal M2 branching. Severe focal stenosis of the right A2 segment seen   on coronal MIP image 34.    The posterior circulation there is similarly patent but with multifocal   stenosis. Focal moderate stenosis involves the right posterior cerebral   artery P1/2 junction on coronal MIP image 34 and additionally at P3   superior branch on sagittal MIP image 52. Similar but milder stenoses   involves left PCA at similar sites.    The vertebral arteries are patent, with the nondominant right side mildly   narrowed from atherosclerotic plaque. Left side is dominant and also   mildly narrowed at V4 segment. Basilar artery is nonstenotic.    There is no site of aneurysm within the resolution limitations of CTA.    EXTRACRANIAL:    The aortic arch is normal caliber with standard vessel configuration. No   significant great vessel stenosis, despite calcified plaque at the left   subclavian origin.    Both common carotid arteries are patent without stenosis; minimal plaque   at bifurcations. Similarly, both internal carotid arteries are patent at   origins and throughout the neck up to the skull base without stenosis or   dissection.    Vertebral arteries are patent at their origins and throughout their   course in the neck, without stenosis or evidence of dissection. The left   side is dominant.

## 2024-02-04 ENCOUNTER — TRANSCRIPTION ENCOUNTER (OUTPATIENT)
Age: 69
End: 2024-02-04

## 2024-02-04 LAB
A1C WITH ESTIMATED AVERAGE GLUCOSE RESULT: 11.6 % — HIGH (ref 4–5.6)
A1C WITH ESTIMATED AVERAGE GLUCOSE RESULT: 11.7 % — HIGH (ref 4–5.6)
ANION GAP SERPL CALC-SCNC: 11 MMOL/L — SIGNIFICANT CHANGE UP (ref 5–17)
APPEARANCE UR: CLEAR — SIGNIFICANT CHANGE UP
BACTERIA # UR AUTO: NEGATIVE /HPF — SIGNIFICANT CHANGE UP
BASOPHILS # BLD AUTO: 0.02 K/UL — SIGNIFICANT CHANGE UP (ref 0–0.2)
BASOPHILS NFR BLD AUTO: 0.3 % — SIGNIFICANT CHANGE UP (ref 0–2)
BILIRUB UR-MCNC: NEGATIVE — SIGNIFICANT CHANGE UP
BUN SERPL-MCNC: 10 MG/DL — SIGNIFICANT CHANGE UP (ref 7–23)
CALCIUM SERPL-MCNC: 9.4 MG/DL — SIGNIFICANT CHANGE UP (ref 8.4–10.5)
CAST: 0 /LPF — SIGNIFICANT CHANGE UP (ref 0–4)
CHLORIDE SERPL-SCNC: 104 MMOL/L — SIGNIFICANT CHANGE UP (ref 96–108)
CHOLEST SERPL-MCNC: 161 MG/DL — SIGNIFICANT CHANGE UP
CO2 SERPL-SCNC: 24 MMOL/L — SIGNIFICANT CHANGE UP (ref 22–31)
COLOR SPEC: YELLOW — SIGNIFICANT CHANGE UP
CREAT SERPL-MCNC: 0.67 MG/DL — SIGNIFICANT CHANGE UP (ref 0.5–1.3)
DIFF PNL FLD: NEGATIVE — SIGNIFICANT CHANGE UP
EGFR: 96 ML/MIN/1.73M2 — SIGNIFICANT CHANGE UP
EOSINOPHIL # BLD AUTO: 0.08 K/UL — SIGNIFICANT CHANGE UP (ref 0–0.5)
EOSINOPHIL NFR BLD AUTO: 1.1 % — SIGNIFICANT CHANGE UP (ref 0–6)
ESTIMATED AVERAGE GLUCOSE: 286 MG/DL — HIGH (ref 68–114)
ESTIMATED AVERAGE GLUCOSE: 289 MG/DL — HIGH (ref 68–114)
GLUCOSE BLDC GLUCOMTR-MCNC: 104 MG/DL — HIGH (ref 70–99)
GLUCOSE BLDC GLUCOMTR-MCNC: 148 MG/DL — HIGH (ref 70–99)
GLUCOSE BLDC GLUCOMTR-MCNC: 178 MG/DL — HIGH (ref 70–99)
GLUCOSE BLDC GLUCOMTR-MCNC: 184 MG/DL — HIGH (ref 70–99)
GLUCOSE BLDC GLUCOMTR-MCNC: 239 MG/DL — HIGH (ref 70–99)
GLUCOSE BLDC GLUCOMTR-MCNC: 250 MG/DL — HIGH (ref 70–99)
GLUCOSE SERPL-MCNC: 164 MG/DL — HIGH (ref 70–99)
GLUCOSE UR QL: 250 MG/DL
HCT VFR BLD CALC: 42.6 % — SIGNIFICANT CHANGE UP (ref 34.5–45)
HDLC SERPL-MCNC: 57 MG/DL — SIGNIFICANT CHANGE UP
HGB BLD-MCNC: 13.5 G/DL — SIGNIFICANT CHANGE UP (ref 11.5–15.5)
IMM GRANULOCYTES NFR BLD AUTO: 0.4 % — SIGNIFICANT CHANGE UP (ref 0–0.9)
KETONES UR-MCNC: NEGATIVE MG/DL — SIGNIFICANT CHANGE UP
LEUKOCYTE ESTERASE UR-ACNC: ABNORMAL
LIPID PNL WITH DIRECT LDL SERPL: 92 MG/DL — SIGNIFICANT CHANGE UP
LYMPHOCYTES # BLD AUTO: 1.64 K/UL — SIGNIFICANT CHANGE UP (ref 1–3.3)
LYMPHOCYTES # BLD AUTO: 21.6 % — SIGNIFICANT CHANGE UP (ref 13–44)
MAGNESIUM SERPL-MCNC: 1.9 MG/DL — SIGNIFICANT CHANGE UP (ref 1.6–2.6)
MCHC RBC-ENTMCNC: 25.9 PG — LOW (ref 27–34)
MCHC RBC-ENTMCNC: 31.7 GM/DL — LOW (ref 32–36)
MCV RBC AUTO: 81.6 FL — SIGNIFICANT CHANGE UP (ref 80–100)
MONOCYTES # BLD AUTO: 0.54 K/UL — SIGNIFICANT CHANGE UP (ref 0–0.9)
MONOCYTES NFR BLD AUTO: 7.1 % — SIGNIFICANT CHANGE UP (ref 2–14)
NEUTROPHILS # BLD AUTO: 5.27 K/UL — SIGNIFICANT CHANGE UP (ref 1.8–7.4)
NEUTROPHILS NFR BLD AUTO: 69.5 % — SIGNIFICANT CHANGE UP (ref 43–77)
NITRITE UR-MCNC: NEGATIVE — SIGNIFICANT CHANGE UP
NON HDL CHOLESTEROL: 104 MG/DL — SIGNIFICANT CHANGE UP
NRBC # BLD: 0 /100 WBCS — SIGNIFICANT CHANGE UP (ref 0–0)
PH UR: 6.5 — SIGNIFICANT CHANGE UP (ref 5–8)
PLATELET # BLD AUTO: 282 K/UL — SIGNIFICANT CHANGE UP (ref 150–400)
POTASSIUM SERPL-MCNC: 3.7 MMOL/L — SIGNIFICANT CHANGE UP (ref 3.5–5.3)
POTASSIUM SERPL-SCNC: 3.7 MMOL/L — SIGNIFICANT CHANGE UP (ref 3.5–5.3)
PROT UR-MCNC: NEGATIVE MG/DL — SIGNIFICANT CHANGE UP
RBC # BLD: 5.22 M/UL — HIGH (ref 3.8–5.2)
RBC # FLD: 13 % — SIGNIFICANT CHANGE UP (ref 10.3–14.5)
RBC CASTS # UR COMP ASSIST: 0 /HPF — SIGNIFICANT CHANGE UP (ref 0–4)
SODIUM SERPL-SCNC: 139 MMOL/L — SIGNIFICANT CHANGE UP (ref 135–145)
SP GR SPEC: 1.01 — SIGNIFICANT CHANGE UP (ref 1–1.03)
SQUAMOUS # UR AUTO: 1 /HPF — SIGNIFICANT CHANGE UP (ref 0–5)
TRIGL SERPL-MCNC: 62 MG/DL — SIGNIFICANT CHANGE UP
UROBILINOGEN FLD QL: 0.2 MG/DL — SIGNIFICANT CHANGE UP (ref 0.2–1)
WBC # BLD: 7.58 K/UL — SIGNIFICANT CHANGE UP (ref 3.8–10.5)
WBC # FLD AUTO: 7.58 K/UL — SIGNIFICANT CHANGE UP (ref 3.8–10.5)
WBC UR QL: 4 /HPF — SIGNIFICANT CHANGE UP (ref 0–5)

## 2024-02-04 PROCEDURE — 99223 1ST HOSP IP/OBS HIGH 75: CPT

## 2024-02-04 PROCEDURE — 99232 SBSQ HOSP IP/OBS MODERATE 35: CPT

## 2024-02-04 PROCEDURE — 70544 MR ANGIOGRAPHY HEAD W/O DYE: CPT | Mod: 26

## 2024-02-04 RX ORDER — ASPIRIN/CALCIUM CARB/MAGNESIUM 324 MG
81 TABLET ORAL DAILY
Refills: 0 | Status: DISCONTINUED | OUTPATIENT
Start: 2024-02-04 | End: 2024-02-07

## 2024-02-04 RX ORDER — ACETAMINOPHEN 500 MG
975 TABLET ORAL ONCE
Refills: 0 | Status: COMPLETED | OUTPATIENT
Start: 2024-02-04 | End: 2024-02-04

## 2024-02-04 RX ORDER — LOSARTAN POTASSIUM 100 MG/1
50 TABLET, FILM COATED ORAL DAILY
Refills: 0 | Status: DISCONTINUED | OUTPATIENT
Start: 2024-02-04 | End: 2024-02-05

## 2024-02-04 RX ADMIN — INSULIN GLARGINE 18 UNIT(S): 100 INJECTION, SOLUTION SUBCUTANEOUS at 00:32

## 2024-02-04 RX ADMIN — LOSARTAN POTASSIUM 50 MILLIGRAM(S): 100 TABLET, FILM COATED ORAL at 06:30

## 2024-02-04 RX ADMIN — Medication 12 UNIT(S): at 18:44

## 2024-02-04 RX ADMIN — Medication 12 UNIT(S): at 07:46

## 2024-02-04 RX ADMIN — INSULIN GLARGINE 18 UNIT(S): 100 INJECTION, SOLUTION SUBCUTANEOUS at 21:33

## 2024-02-04 RX ADMIN — Medication 650 MILLIGRAM(S): at 00:32

## 2024-02-04 RX ADMIN — Medication 2: at 18:43

## 2024-02-04 RX ADMIN — ATORVASTATIN CALCIUM 20 MILLIGRAM(S): 80 TABLET, FILM COATED ORAL at 21:33

## 2024-02-04 RX ADMIN — Medication 975 MILLIGRAM(S): at 12:43

## 2024-02-04 RX ADMIN — Medication 81 MILLIGRAM(S): at 12:43

## 2024-02-04 NOTE — PROGRESS NOTE ADULT - PROBLEM SELECTOR PLAN 6
Plan:  -c/w home Atorvastatin 20mg daily  -Check lipid panel in the AM  -Consider ASA 81 for stroke risk reduction Lipid panel with LDL 92.   Plan:  -c/w home Atorvastatin 20mg daily  -Consider ASA 81 for stroke risk reduction

## 2024-02-04 NOTE — DISCHARGE NOTE PROVIDER - NSDCCPTREATMENT_GEN_ALL_CORE_FT
PRINCIPAL PROCEDURE  Procedure: MR angiogram brain  Findings and Treatment: Patent distal internal carotid arteries.  Focal severe stenosis/occlusion of the right anterior cerebral artery A2 segment (see key image #1). Moderate to severe stenoses of the left MI A2 segment (see key image #2).  Moderate to severe stenoses of the bilateral MCAs at the bifurcations (see key image #3). Focal severe stenosis of the left MCA posterior M2 branch (see key image #4).  Multiple severe stenoses throughout the bilateral PCAs proximally (see key image #5).  Patent vertebrobasilar system.  No evidence of aneurysm or vascular malformation.  IMPRESSION:  Multiple moderate and severe this is stenoses throughout the anterior and posterior circulation as detailed above. This is stable since CTA 2/3/2024.        SECONDARY PROCEDURE  Procedure: CT head and neck  Findings and Treatment: HEAD CT:  There is age-appropriate parenchymal volume without hydrocephalus.   Symmetric arachnoid cyst formation noted at superior parietal convexity.   No midline shift or extra-axial hemorrhage collection.  There is no acute parenchymal hemorrhage, and gray-white matter   differentiation appears preserved without evidence of recent   transcortical infarct. Mild white matter microangiopathy is suspected.  On bony window inspection, there is no calvarial fracture. The   tympanomastoid cavities are well-aerated.  MAXILLOFACIAL CT:  The bony orbits are intact without fracture. Soft tissue evaluation of the orbits shows no retrobulbar hemorrhage, mass effect or asymmetry of the globes. No radiopaque foreign body. Ocular lenses are replaced.  The mandible is intact without TMJ dislocation.  The nasal bones, zygomatic arches, pterygoid plates and skull base are intact.  There is mild scattered mucosal thickening in the paranasal sinuses without fluid level.  CERVICAL SPINE CT:  There is nonspecific straightening of cervical lordosis. No spondylolisthesis, facet subluxation or malalignment at the craniovertebral or atlantoaxial articulations.  There is no prevertebral swelling or other acute extraspinal soft tissue abnormality.  The vertebral bodies are preserved in height and density. No acute or displaced fracture.  There is mild intervertebral disc height loss at the C5-6 level with small posterior osteophyte complex. No significant spinal stenosis.   Uncinate spurring results and right asymmetric neural foraminal narrowing; correlate for right C6 radiculopathy. No large disc herniation visualized. Facet arthrosis left asymmetric at C3-4.  IMPRESSION:  HEAD CT:  No acute intracranial hemorrhage or calvarial fracture.  FACIAL CT:  No acute fracture, intraorbital hemorrhage or radiopaque foreign body.  CERVICAL SPINE CT:  No acute fracture or traumatic malalignment.  Disc degeneration at C5-6.    Procedure: CT angiogram head w contrast  Findings and Treatment: INTRACRANIAL:  The internal carotid arteries are patent at the skull base and intracranial compartment without occlusion or high grade stenosis.  Anterior and middle cerebral arteries are patent. No branch occlusion. However, there is moderate narrowing of the distal MCA M1 segments and proximal M2 branching. Severe focal stenosis of the right A2 segment seen on coronal MIP image 34.  The posterior circulation there is similarly patent but with multifocal stenosis. Focal moderate stenosis involves the right posterior cerebral artery P1/2 junction on coronal MIP image 34 and additionally at P3 superior branch onsagittal MIP image 52. Similar but milder stenoses involves left PCA at similar sites.  The vertebral arteries are patent, with the nondominant right side mildly narrowed from atherosclerotic plaque. Left side is dominant and also mildly narrowed at V4 segment. Basilar artery is nonstenotic.  There is no site of aneurysm within the resolution limitations of CTA.  EXTRACRANIAL:  The aortic arch is normal caliber with standard vessel configuration. No significant great vessel stenosis, despite calcified plaque at the left subclavian origin.  Both common carotid arteries are patent without stenosis; minimal plaque to bifurcations. Similarly, both internal carotid arteries are patent at origins and throughout the neck up to the skull base without stenosis or dissection.  Vertebral arteries are patent at their origins and throughout their course in the neck, without stenosis or evidence of dissection. The left side is dominant.  IMPRESSION:  INTRACRANIAL CTA:  No large vessel occlusion.  However there is multifocal atherosclerotic narrowing, most severe focally of the right A2, moderately at right P1-2, and mild to moderate involving both middle cerebral arteries, vertebral arteries and left PCA.  EXTRACRANIAL CTA:  No arterial steno-occlusive disease despite mild plaque. No evidence of dissection.    Procedure: XR hand complete  Findings and Treatment: TECHNIQUE: X-ray left hand 2 views, x-ray left wrist 3 views.  FINDINGS: No acute fracture. No dislocation. Joint spaces are maintained.  IMPRESSION:  No acute fracture. No dislocation.      Procedure: X-ray right knee, 3 views  Findings and Treatment: FINDINGS:  No acute fracture. No dislocation. Mild tricompartmental arthrosis. No joint effusion.  IMPRESSION:  No acute fracture. No dislocation.     PRINCIPAL PROCEDURE  Procedure: MR angiogram brain  Findings and Treatment: Patent distal internal carotid arteries.  Focal severe stenosis/occlusion of the right anterior cerebral artery A2 segment (see key image #1). Moderate to severe stenoses of the left MI A2 segment (see key image #2).  Moderate to severe stenoses of the bilateral MCAs at the bifurcations (see key image #3). Focal severe stenosis of the left MCA posterior M2 branch (see key image #4).  Multiple severe stenoses throughout the bilateral PCAs proximally (see key image #5).  Patent vertebrobasilar system.  No evidence of aneurysm or vascular malformation.  IMPRESSION:  Multiple moderate and severe this is stenoses throughout the anterior and posterior circulation as detailed above. This is stable since CTA 2/3/2024.        SECONDARY PROCEDURE  Procedure: CT head and neck  Findings and Treatment: HEAD CT:  There is age-appropriate parenchymal volume without hydrocephalus.   Symmetric arachnoid cyst formation noted at superior parietal convexity.   No midline shift or extra-axial hemorrhage collection.  There is no acute parenchymal hemorrhage, and gray-white matter   differentiation appears preserved without evidence of recent   transcortical infarct. Mild white matter microangiopathy is suspected.  On bony window inspection, there is no calvarial fracture. The   tympanomastoid cavities are well-aerated.  MAXILLOFACIAL CT:  The bony orbits are intact without fracture. Soft tissue evaluation of the orbits shows no retrobulbar hemorrhage, mass effect or asymmetry of the globes. No radiopaque foreign body. Ocular lenses are replaced.  The mandible is intact without TMJ dislocation.  The nasal bones, zygomatic arches, pterygoid plates and skull base are intact.  There is mild scattered mucosal thickening in the paranasal sinuses without fluid level.  CERVICAL SPINE CT:  There is nonspecific straightening of cervical lordosis. No spondylolisthesis, facet subluxation or malalignment at the craniovertebral or atlantoaxial articulations.  There is no prevertebral swelling or other acute extraspinal soft tissue abnormality.  The vertebral bodies are preserved in height and density. No acute or displaced fracture.  There is mild intervertebral disc height loss at the C5-6 level with small posterior osteophyte complex. No significant spinal stenosis.   Uncinate spurring results and right asymmetric neural foraminal narrowing; correlate for right C6 radiculopathy. No large disc herniation visualized. Facet arthrosis left asymmetric at C3-4.  IMPRESSION:  HEAD CT:  No acute intracranial hemorrhage or calvarial fracture.  FACIAL CT:  No acute fracture, intraorbital hemorrhage or radiopaque foreign body.  CERVICAL SPINE CT:  No acute fracture or traumatic malalignment.  Disc degeneration at C5-6.    Procedure: XR hand complete  Findings and Treatment: TECHNIQUE: X-ray left hand 2 views, x-ray left wrist 3 views.  FINDINGS: No acute fracture. No dislocation. Joint spaces are maintained.  IMPRESSION:  No acute fracture. No dislocation.      Procedure: X-ray right knee, 3 views  Findings and Treatment: FINDINGS:  No acute fracture. No dislocation. Mild tricompartmental arthrosis. No joint effusion.  IMPRESSION:  No acute fracture. No dislocation.    Procedure: CT angiogram head w contrast  Findings and Treatment: INTRACRANIAL:  The internal carotid arteries are patent at the skull base and intracranial compartment without occlusion or high grade stenosis.  Anterior and middle cerebral arteries are patent. No branch occlusion. However, there is moderate narrowing of the distal MCA M1 segments and proximal M2 branching. Severe focal stenosis of the right A2 segment seen on coronal MIP image 34.  The posterior circulation there is similarly patent but with multifocal stenosis. Focal moderate stenosis involves the right posterior cerebral artery P1/2 junction on coronal MIP image 34 and additionally at P3 superior branch onsagittal MIP image 52. Similar but milder stenoses involves left PCA at similar sites.  The vertebral arteries are patent, with the nondominant right side mildly narrowed from atherosclerotic plaque. Left side is dominant and also mildly narrowed at V4 segment. Basilar artery is nonstenotic.  There is no site of aneurysm within the resolution limitations of CTA.  EXTRACRANIAL:  The aortic arch is normal caliber with standard vessel configuration. No significant great vessel stenosis, despite calcified plaque at the left subclavian origin.  Both common carotid arteries are patent without stenosis; minimal plaque to bifurcations. Similarly, both internal carotid arteries are patent at origins and throughout the neck up to the skull base without stenosis or dissection.  Vertebral arteries are patent at their origins and throughout their course in the neck, without stenosis or evidence of dissection. The left side is dominant.  IMPRESSION:  INTRACRANIAL CTA:  No large vessel occlusion.  However there is multifocal atherosclerotic narrowing, most severe focally of the right A2, moderately at right P1-2, and mild to moderate involving both middle cerebral arteries, vertebral arteries and left PCA.  EXTRACRANIAL CTA:  No arterial steno-occlusive disease despite mild plaque. No evidence of dissection.    Procedure: Transthoracic echocardiography (TTE)  Findings and Treatment: CONCLUSIONS:      1. Left ventricular cavity is normal. Left ventricular wall thickness is normal. Left ventricular systolic function is normal with an ejection fraction of 65 % by 3D. There are no regional wall motion abnormalities seen.   2. The left ventricular diastolic function is indeterminate, with normal filling pressure.   3. Normal right ventricular cavity size, wall thickness, and normal systolic function.   4. Normal left and right atrial size.   5. No significant valvular disease.   6. No pericardial effusion seen.   7. Pulmonary artery systolic pressure could not be estimated.   8. Compared to the transthoracic echocardiogram performed on 1/13/2020, there have been no significant interval changes.

## 2024-02-04 NOTE — DISCHARGE NOTE PROVIDER - PROVIDER TOKENS
PROVIDER:[TOKEN:[[id number]],FOLLOWUP:[2 weeks],ESTABLISHEDPATIENT:[T]] PROVIDER:[TOKEN:[[id number]],FOLLOWUP:[2 weeks],ESTABLISHEDPATIENT:[T]],PROVIDER:[TOKEN:[[id number]],FOLLOWUP:[1 week]]

## 2024-02-04 NOTE — DISCHARGE NOTE PROVIDER - ATTENDING DISCHARGE PHYSICAL EXAMINATION:
Pt discharged on 2/6, but family was unable to  pt until early morning on 2/7.   D/c services provided 2/6

## 2024-02-04 NOTE — DISCHARGE NOTE PROVIDER - CARE PROVIDER_API CALL
Renata Powell  Internal Medicine  [address]  [place] [address]  Phone: [phone]  Fax: [phone]  Established Patient  Follow Up Time: 2 weeks   Renata Powell  Internal Medicine  [address], [address]  [place] [address]  Phone: [phone]  Fax: [phone]  Established Patient  Follow Up Time: 2 weeks    Tomas Garcia  Neurology  [address], [address]  [place] [address]  Phone: [phone]  Fax: [phone]  Follow Up Time: 1 week

## 2024-02-04 NOTE — DISCHARGE NOTE PROVIDER - NSDCCPCAREPLAN_GEN_ALL_CORE_FT
PRINCIPAL DISCHARGE DIAGNOSIS  Diagnosis: Fall  Assessment and Plan of Treatment: You were seen in the hospital after a fall. You were evaluated with an EKG, ultrasound of the heart, and imaging of the head, which all were negative. The MRI of the head showed stenoses in the anterior and posterior vessels of the brain, which were stable from prior imaging.      SECONDARY DISCHARGE DIAGNOSES  Diagnosis: Lip laceration  Assessment and Plan of Treatment: You had a lip and forehead laceration from your fall. This was repaired with stitching and adhesive.     PRINCIPAL DISCHARGE DIAGNOSIS  Diagnosis: Fall  Assessment and Plan of Treatment: You were seen in the hospital after a fall. You were evaluated with an EKG, ultrasound of the heart, and imaging of the head, which all were negative. The MRI of the head showed stenoses in the anterior and posterior vessels of the brain, which were stable from prior imaging. It is recommended that you get an MRI of your brain after you are discharged from the hospital. Your medications were also modified during your hospital stay. Please take 100mg of losartan instead of your prior dose and 40mg of atorvastatin instead of your prior dose. These medications have been sent to your pharmacy. Please return to ED if you experience weakness, numbness/tingling, severe dizziness, nausea, vomiting, chest pain, shortness of breath, or fevers/chills.      SECONDARY DISCHARGE DIAGNOSES  Diagnosis: Lip laceration  Assessment and Plan of Treatment: You had a lip and forehead laceration from your fall. This was repaired with stitching and adhesive.

## 2024-02-04 NOTE — DISCHARGE NOTE PROVIDER - NSDCMRMEDTOKEN_GEN_ALL_CORE_FT
Admelog 100 units/mL injectable solution: 12 unit(s) subcutaneous 3 times a day (before meals)  atorvastatin 20 mg oral tablet: 1 tab(s) orally once a day  Basaglar KwikPen 100 units/mL subcutaneous solution: 18 unit(s) subcutaneous once a day (at bedtime)  losartan 50 mg oral tablet: 1 tab(s) orally once a day   Admelog 100 units/mL injectable solution: 12 unit(s) subcutaneous 3 times a day (before meals)  Aspirin Low Dose 81 mg oral tablet, chewable: 1 tab(s) orally once a day  atorvastatin 40 mg oral tablet: 1 tab(s) orally once a day  Basaglar KwikPen 100 units/mL subcutaneous solution: 18 unit(s) subcutaneous once a day (at bedtime)  losartan 100 mg oral tablet: 1 tab(s) orally once a day   Admelog 100 units/mL injectable solution: 12 unit(s) subcutaneous 3 times a day (before meals)  amLODIPine 5 mg oral tablet: 1 tab(s) orally once a day  Aspirin Low Dose 81 mg oral tablet, chewable: 1 tab(s) orally once a day  atorvastatin 40 mg oral tablet: 1 tab(s) orally once a day  Basaglar KwikPen 100 units/mL subcutaneous solution: 18 unit(s) subcutaneous once a day (at bedtime)  losartan 100 mg oral tablet: 1 tab(s) orally once a day

## 2024-02-04 NOTE — ED ADULT NURSE REASSESSMENT NOTE - NS ED NURSE REASSESS COMMENT FT1
Report given to LESLY Ramirez. Patient A&O x 3, vital signs stable. Pt aware of RN re-assignment and that they are still awaiting bed upstairs.

## 2024-02-04 NOTE — PROGRESS NOTE ADULT - TIME BILLING
- Ordering, reviewing, and interpreting labs, testing, and imaging.  - Independently obtaining a review of systems and performing a physical exam  - Reviewing consultant documentation

## 2024-02-04 NOTE — PHYSICAL THERAPY INITIAL EVALUATION ADULT - PERTINENT HX OF CURRENT PROBLEM, REHAB EVAL
69 yo F with PMhx HLD DM presents for unwitnessed fall. HIstory obtained from daughter at bedside. Patient was walking outside the morning prior to admission when she felt that the ground was uneven and she fell on her face. She denies any preceding symptoms such as chest pain, SOB, weakness, dizziness, LOC. She does not recall the fall. Pt was able to get up on her own and walk back to her house where she told her daughter. Denies, weakness, vision changes, confusion, headache, CP, SOB,.  2/3 (-) XRay Pelvis, left wrist, left hand, right knee.   HEAD CT: No acute intracranial hemorrhage or calvarial fracture. FACIAL CT: No acute fracture, intraorbital hemorrhage or radiopaque foreign body. CERVICAL SPINE CT: No acute fracture or traumatic malalignment. Disc degeneration at C5-6.

## 2024-02-04 NOTE — DISCHARGE NOTE PROVIDER - NSFOLLOWUPCLINICS_GEN_ALL_ED_FT
[facility]  Endocrinology  [address] [place] [address]  Phone: [phone]  Fax:   Follow Up Time: 2 weeks

## 2024-02-04 NOTE — PHYSICAL THERAPY INITIAL EVALUATION ADULT - ADDITIONAL COMMENTS
Pt lives with her daughter in an apartment, 8 steps to enter, no steps required inside. Pt was independent with all functional mobility and ADLs prior to admission without AD.

## 2024-02-04 NOTE — DISCHARGE NOTE PROVIDER - NSDCFUADDAPPT_GEN_ALL_CORE_FT
- Please follow up with your primary care doctor within 2 weeks of discharge to review your hospitalization. APPTS ARE READY TO BE MADE: [ ] YES    Best Family or Patient Contact (if needed):    Additional Information about above appointments (if needed):    1: Please follow up with your primary care doctor within 2 weeks of discharge to review your hospitalization.  2: Please follow up with Dr. Garcia of neurology within 1 weeks of discharge for brain MRI  3: Please follow up with endocrinology within 2 weeks of discharge for further management of diabetes    Other comments or requests:

## 2024-02-04 NOTE — DISCHARGE NOTE PROVIDER - CARE PROVIDERS DIRECT ADDRESSES
,[email or web address]

## 2024-02-04 NOTE — PROGRESS NOTE ADULT - PROBLEM SELECTOR PLAN 8
Diet: DASH/Diabetes  Dispo: pending PT eval Diet: DASH/Diabetes  Dispo: PT leandro w/o skilled PT needs

## 2024-02-04 NOTE — DISCHARGE NOTE PROVIDER - HOSPITAL COURSE
67 yo F with PMhx HLD DM presents for unwitnessed fall. History obtained from daughter at bedside. Patient was walking outside the morning prior to admission when she felt that the ground was uneven and she fell on her face. She denies any preceding symptoms such as chest pain, SOB, weakness, dizziness, LOC. She does not recall the fall. Pt was able to get up on her own and walk back to her house where she told her daughter. Denies, weakness, vision changes, confusion, headache, CP, SOB.    On arrival FS>200, /84 HR 88 afebrile 100% on RA.    Hospital course:  XR imaging of hands and knees showed no acute fractures. Syncope workup was initiated. CT head and spine imaging showed no acute infarcts or hemorrhage. CTA showed atherosclerosis of several vessels. MRA done showed stable stenoses in anterior and posterior vessels. Neurovascular was consulted for the stenoses, and recommended MRI brain (inpatient or outpatient) if cardiac workup is negative, and stenoses are most likely chronic in nature, as patient as no focal neuro deficits, and with A1c 11. Patient was restarted on her home medicine losartan for hypertension. EKG showed nonspecific T wave abnormalities. Echo done showed ______________  Patient was evaluated by physical therapy, with no skilled PT needs.    The patient is afebrile, hemodynamically stable and medically optimized for discharge to home with follow up with _________________. On day of discharge, patient is clinically stable with no new exam findings or acute symptoms compared to prior. The patient was seen by the attending physician on the date of discharge and deemed stable and acceptable for discharge. The patient's medication reconciliation (with changes made to chronic medications), follow up appointments, discharge orders, instructions, and significant lab and diagnostic studies are as noted.    Important Medication Changes and Reason:      Active or Pending Issues Requiring Follow-up:      Advanced Directives:   [X] Full code  [ ] DNR  [ ] Hospice       69 yo F with PMhx HLD DM presents for unwitnessed fall. History obtained from daughter at bedside. Patient was walking outside the morning prior to admission when she felt that the ground was uneven and she fell on her face. She denies any preceding symptoms such as chest pain, SOB, weakness, dizziness, LOC. She does not recall the fall. Pt was able to get up on her own and walk back to her house where she told her daughter. Denies, weakness, vision changes, confusion, headache, CP, SOB.    On arrival FS>200, /84 HR 88 afebrile 100% on RA.    Hospital course:  XR imaging of hands and knees showed no acute fractures. Syncope workup was initiated. CT head and spine imaging showed no acute infarcts or hemorrhage. CTA showed atherosclerosis of several vessels. MRA done showed stable stenoses in anterior and posterior vessels. Neurovascular was consulted for the stenoses, and recommended MRI brain outpatient if cardiac workup is negative, and stenoses are most likely chronic in nature, as patient has no focal neuro deficits. EKG showed nonspecific ST and T wave abnormalities. Echo done and grossly normal with LVEF of 65%. Patient was restarted on her home medicine losartan for hypertension, and the dose was increased to 100mg 2/2 high pressures. Her atorvastatin was increased to 40mg. Patient was evaluated by physical therapy, with no skilled PT needs. At time of discharge, patient is asymptomatic and stable for discharge with outpatient follow-up    The patient is afebrile, hemodynamically stable and medically optimized for discharge to home with follow up with neurology, endocrinology. On day of discharge, patient is clinically stable with no new exam findings or acute symptoms compared to prior. The patient was seen by the attending physician on the date of discharge and deemed stable and acceptable for discharge. The patient's medication reconciliation (with changes made to chronic medications), follow up appointments, discharge orders, instructions, and significant lab and diagnostic studies are as noted.    Important Medication Changes and Reason:  -Increased losartan to 100mg  -Increased atorvastatin to 40mg    Active or Pending Issues Requiring Follow-up:  -F/u outpatient brain MRI    Advanced Directives:   [X] Full code  [ ] DNR  [ ] Hospice       69 yo F with PMhx HLD DM presents for unwitnessed fall. History obtained from daughter at bedside. Patient was walking outside the morning prior to admission when she felt that the ground was uneven and she fell on her face. She denies any preceding symptoms such as chest pain, SOB, weakness, dizziness, LOC. She does not recall the fall. Pt was able to get up on her own and walk back to her house where she told her daughter. Denies, weakness, vision changes, confusion, headache, CP, SOB.    On arrival FS>200, /84 HR 88 afebrile 100% on RA.    Hospital course:  XR imaging of hands and knees showed no acute fractures. Syncope workup was initiated. CT head and spine imaging showed no acute infarcts or hemorrhage. CTA showed atherosclerosis of several vessels. MRA done showed stable stenoses in anterior and posterior vessels. Neurovascular was consulted for the stenoses, and recommended MRI brain outpatient if cardiac workup is negative, and stenoses are most likely chronic in nature, as patient has no focal neuro deficits. EKG showed nonspecific ST and T wave abnormalities. Echo done and grossly normal with LVEF of 65%. Patient was restarted on her home medicine losartan for hypertension, and the dose was increased to 100mg 2/2 high pressures. Her atorvastatin was increased to 40mg. Patient was evaluated by physical therapy, with no skilled PT needs. At time of discharge, patient is asymptomatic and stable for discharge with outpatient follow-up    The patient is afebrile, hemodynamically stable and medically optimized for discharge to home with follow up with neurology, endocrinology. On day of discharge, patient is clinically stable with no new exam findings or acute symptoms compared to prior. The patient was seen by the attending physician on the date of discharge and deemed stable and acceptable for discharge. The patient's medication reconciliation (with changes made to chronic medications), follow up appointments, discharge orders, instructions, and significant lab and diagnostic studies are as noted.    Important Medication Changes and Reason:  -Increased losartan to 100mg  -Increased atorvastatin to 40mg  -Added amlodipine 5mg for better BP control    Active or Pending Issues Requiring Follow-up:  -F/u outpatient brain MRI    Advanced Directives:   [X] Full code  [ ] DNR  [ ] Hospice

## 2024-02-04 NOTE — DISCHARGE NOTE PROVIDER - NSDCFUSCHEDAPPT_GEN_ALL_CORE_FT
Surinder Powell, [facility]  [facility]  [facility]   Scheduled Appointment: 02/12/2024

## 2024-02-04 NOTE — PROGRESS NOTE ADULT - PROBLEM SELECTOR PLAN 1
-Presented with fall on face, pt does not recall how she fall.  - EKG reviewed and showed ST and T-wave changes, troponin 12, less concern for ACS  - CT head showed multifocal atherosclerotic disease without evidence of acute infarcts, less likely contributing to fall  - neurology following,    Plan:   -Fall precautions   -Orthostatic vital signs  -Monitor on telemetry  -check TTE to r/o cardiac causes of syncope   -Pending MRI brain to r/o stroke  -Upon dc, followup with Dr. Garcia or Neurology resident clinic -Presented with fall on face, pt does not recall how she fall. reports blurry vision during event.  - EKG reviewed and showed ST and T-wave changes, troponin 12, less concern for ACS. May consider outpatient cards follow up   - CT head showed multifocal atherosclerotic disease without evidence of acute infarcts, less likely contributing to fall  - neurology following,    Plan:   -Fall precautions   -Orthostatic vital signs  -Monitor on telemetry  -check TTE to r/o cardiac causes of syncope   -Pending MRI brain to r/o stroke  -Upon dc, followup with Dr. Garcia or Neurology resident clinic

## 2024-02-04 NOTE — PROGRESS NOTE ADULT - ATTENDING COMMENTS
68 year old F with PMHx of DM2, CAD, HTN, HLD, arthritis presented with unwitnessed fall on 2/3. CT imaging showed multifocal atherosclerotic narrowing without evidence of acute infarct, likely not contributing to fall. No focal neurological deficits on exam. Vitals significant for hypertension to 202/117,  Admitted for syncope workup, r/o cardiac causes vs stroke.    Syncope r/o CVA  - CT head showed multifocal atherosclerotic disease without evidence of acute infarcts  - EKG reviewed and showed ST and T-wave changes, troponin 12, less concern for ACS. May consider outpatient cards follow up   -Fall precautions   -Orthostatic vital signs pending   -Monitor on telemetry  -check TTE to r/o cardiac causes of syncope   -Follw up MR   -Upon dc, followup with Dr. Garcia or Neurology resident clinic    DM   A1c 11.6  - Likely not controlled OP  - Will need endocrine follow up    Other details as above 68 year old F with PMHx of DM2, CAD, HTN, HLD, arthritis presented with unwitnessed fall on 2/3. CT imaging showed multifocal atherosclerotic narrowing without evidence of acute infarct, likely not contributing to fall. No focal neurological deficits on exam. Vitals significant for hypertension to 202/117,  Admitted for syncope workup, r/o cardiac causes vs stroke.    Syncope r/o CVA  - CT head showed multifocal atherosclerotic disease without evidence of acute infarcts  - EKG reviewed and showed ST and T-wave changes, troponin 12, less concern for ACS. May consider outpatient cards follow up   -Fall precautions   -Orthostatic vital signs pending   -Monitor on telemetry  -check TTE to r/o cardiac causes of syncope   -Follw up MR   -Upon dc, followup with Dr. Garcia or Neurology resident clinic    DM   A1c 11.6  - Likely not controlled OP  - Will need endocrine follow up    HTN   as above   - reduce by 25% in first 24 hours    Other details as above

## 2024-02-04 NOTE — PROGRESS NOTE ADULT - SUBJECTIVE AND OBJECTIVE BOX
Patient is a 68y old  Female who presents with a chief complaint of unwitnessed fall (03 Feb 2024 22:16)    INTERVAL HPI/OVERNIGHT EVENTS: Admitted for syncope workup. Pt denies any dizziness, chest pain, palpitations. No recent sick contacts.    FAMILY HISTORY:  FH: diabetes mellitus    No Known Allergies    MEDS:  aspirin  chewable 81 milliGRAM(s) Oral daily  atorvastatin 20 milliGRAM(s) Oral at bedtime  dextrose 5%. 1000 milliLiter(s) IV Continuous <Continuous>  dextrose 5%. 1000 milliLiter(s) IV Continuous <Continuous>  dextrose 50% Injectable 25 Gram(s) IV Push once  dextrose 50% Injectable 12.5 Gram(s) IV Push once  dextrose 50% Injectable 25 Gram(s) IV Push once  dextrose Oral Gel 15 Gram(s) Oral once PRN  glucagon  Injectable 1 milliGRAM(s) IntraMuscular once  insulin glargine Injectable (LANTUS) 18 Unit(s) SubCutaneous at bedtime  insulin lispro (ADMELOG) corrective regimen sliding scale   SubCutaneous at bedtime  insulin lispro (ADMELOG) corrective regimen sliding scale   SubCutaneous three times a day before meals  insulin lispro Injectable (ADMELOG) 12 Unit(s) SubCutaneous three times a day before meals  losartan 50 milliGRAM(s) Oral daily      T(C): 36.3 (02-03-24 @ 18:12), Max: 36.9 (02-03-24 @ 11:51)  HR: 71 (02-04-24 @ 06:33) (67 - 88)  BP: 140/72 (02-04-24 @ 06:33) (140/72 - 223/84)  RR: 18 (02-04-24 @ 06:33) (18 - 20)  SpO2: 99% (02-04-24 @ 06:33) (97% - 100%)  Wt(kg): --    PHYSICAL EXAM:  CONSTITUTIONAL: Well groomed, no apparent distress  EYES: PERRLA and symmetric, EOMI, No conjunctival or scleral injection, non-icteric  ENMT: Dried blood in nares. Oral mucosa with moist membranes. Normal dentition; no pharyngeal injection or exudates  NECK: Supple, symmetric and without tracheal deviation   RESP: No respiratory distress, no use of accessory muscles; CTA b/l, no WRR  CV: RRR, +S1S2, no MRG; no JVD; no peripheral edema  GI: Soft, NT, ND, no rebound, no guarding; no palpable masses; no CVA tenderness   MSK: full ROM b/l UE and LE.   SKIN: facial lacerations, sutured right periorbital side, bilateral knee abrasions and palm abrasions.   NEURO: no focal neurological deficits,  A+O x 3,    Consultant(s) Notes Reviewed:  [x ] YES  [ ] NO  Care Discussed with Consultants/Other Providers [ x] YES  [ ] NO    LABS:                        14.0   9.61  )-----------( 274      ( 03 Feb 2024 12:49 )             44.0     136  |  100  |  10  ----------------------------<  257<H>  4.0   |  23  |  0.51    Ca    10.5      03 Feb 2024 12:49    TPro  7.3  /  Alb  4.3  /  TBili  0.5  /  DBili  x   /  AST  21  /  ALT  17  /  AlkPhos  113  02-03    Lactate Trend  02-03 @ 12:49 Lactate:1.9     INTRACRANIAL CTA:  No large vessel occlusion.  However there is multifocal atherosclerotic narrowing, most severe   focally of the right A2, moderately at right P1-2, and mild to moderate   involving both middle cerebral arteries, vertebral arteries and left PCA.    EXTRACRANIAL CTA:  No arterial steno-occlusive disease despite mild plaque. No evidence of   dissection.    HEAD CT:  No acute intracranial hemorrhage or calvarial fracture.    FACIAL CT:  No acute fracture, intraorbital hemorrhage or radiopaque foreign body.    CERVICAL SPINE CT:  No acute fracture or traumatic malalignment.  Disc degeneration at C5-6.    CTA Chest/Abd/Pelvis  IMPRESSION:  No evidence of aortic dissection, thrombosis, or aneurysm. Patient is a 68y old  Female who presents with a chief complaint of unwitnessed fall (03 Feb 2024 22:16)    INTERVAL HPI/OVERNIGHT EVENTS: Admitted for syncope workup. Pt denies any dizziness, chest pain, palpitations. No recent sick contacts. Pt reports having blurry vision when fall happened.    FAMILY HISTORY:  FH: diabetes mellitus    No Known Allergies    MEDS:  aspirin  chewable 81 milliGRAM(s) Oral daily  atorvastatin 20 milliGRAM(s) Oral at bedtime  dextrose 5%. 1000 milliLiter(s) IV Continuous <Continuous>  dextrose 5%. 1000 milliLiter(s) IV Continuous <Continuous>  dextrose 50% Injectable 25 Gram(s) IV Push once  dextrose 50% Injectable 12.5 Gram(s) IV Push once  dextrose 50% Injectable 25 Gram(s) IV Push once  dextrose Oral Gel 15 Gram(s) Oral once PRN  glucagon  Injectable 1 milliGRAM(s) IntraMuscular once  insulin glargine Injectable (LANTUS) 18 Unit(s) SubCutaneous at bedtime  insulin lispro (ADMELOG) corrective regimen sliding scale   SubCutaneous at bedtime  insulin lispro (ADMELOG) corrective regimen sliding scale   SubCutaneous three times a day before meals  insulin lispro Injectable (ADMELOG) 12 Unit(s) SubCutaneous three times a day before meals  losartan 50 milliGRAM(s) Oral daily      T(C): 36.3 (02-03-24 @ 18:12), Max: 36.9 (02-03-24 @ 11:51)  HR: 71 (02-04-24 @ 06:33) (67 - 88)  BP: 140/72 (02-04-24 @ 06:33) (140/72 - 223/84)  RR: 18 (02-04-24 @ 06:33) (18 - 20)  SpO2: 99% (02-04-24 @ 06:33) (97% - 100%)  Wt(kg): --    PHYSICAL EXAM:  CONSTITUTIONAL: Well groomed, no apparent distress  EYES: PERRLA and symmetric, EOMI, No conjunctival or scleral injection, non-icteric  ENMT: Dried blood in nares. Oral mucosa with moist membranes. Normal dentition; no pharyngeal injection or exudates  NECK: Supple, symmetric and without tracheal deviation   RESP: No respiratory distress, no use of accessory muscles; CTA b/l, no WRR  CV: RRR, +S1S2, no MRG; no JVD; no peripheral edema  GI: Soft, NT, ND, no rebound, no guarding; no palpable masses; no CVA tenderness   MSK: full ROM b/l UE and LE.   SKIN: facial lacerations, sutured right periorbital side, bilateral knee abrasions and palm abrasions.   NEURO: no focal neurological deficits,  A+O x 3,    Consultant(s) Notes Reviewed:  [x ] YES  [ ] NO  Care Discussed with Consultants/Other Providers [ x] YES  [ ] NO    LABS:                        14.0   9.61  )-----------( 274      ( 03 Feb 2024 12:49 )             44.0     136  |  100  |  10  ----------------------------<  257<H>  4.0   |  23  |  0.51    Ca    10.5      03 Feb 2024 12:49    TPro  7.3  /  Alb  4.3  /  TBili  0.5  /  DBili  x   /  AST  21  /  ALT  17  /  AlkPhos  113  02-03    Lactate Trend  02-03 @ 12:49 Lactate:1.9     INTRACRANIAL CTA:  No large vessel occlusion.  However there is multifocal atherosclerotic narrowing, most severe   focally of the right A2, moderately at right P1-2, and mild to moderate   involving both middle cerebral arteries, vertebral arteries and left PCA.    EXTRACRANIAL CTA:  No arterial steno-occlusive disease despite mild plaque. No evidence of   dissection.    HEAD CT:  No acute intracranial hemorrhage or calvarial fracture.    FACIAL CT:  No acute fracture, intraorbital hemorrhage or radiopaque foreign body.    CERVICAL SPINE CT:  No acute fracture or traumatic malalignment.  Disc degeneration at C5-6.    CTA Chest/Abd/Pelvis  IMPRESSION:  No evidence of aortic dissection, thrombosis, or aneurysm.

## 2024-02-04 NOTE — PROGRESS NOTE ADULT - PROBLEM SELECTOR PLAN 4
Plan:  -c/w Lantus 18u qhs  -c/w Admelog 12u TID  -Low ISS  -A1c in the AM A1c 11.6     Plan:  -c/w Lantus 18u qhs  -c/w Admelog 12u TID  -Low ISS

## 2024-02-04 NOTE — PROGRESS NOTE ADULT - PROBLEM SELECTOR PLAN 3
On admission, /117 at 12PM, 190/76 at 6PM.     Plan:   -c/w home Losartan 50mg daily  - Lower BP by no more than 25% of MAP within first few hours

## 2024-02-05 ENCOUNTER — TRANSCRIPTION ENCOUNTER (OUTPATIENT)
Age: 69
End: 2024-02-05

## 2024-02-05 LAB
ANION GAP SERPL CALC-SCNC: 13 MMOL/L — SIGNIFICANT CHANGE UP (ref 5–17)
BASOPHILS # BLD AUTO: 0.03 K/UL — SIGNIFICANT CHANGE UP (ref 0–0.2)
BASOPHILS NFR BLD AUTO: 0.4 % — SIGNIFICANT CHANGE UP (ref 0–2)
BUN SERPL-MCNC: 20 MG/DL — SIGNIFICANT CHANGE UP (ref 7–23)
CALCIUM SERPL-MCNC: 10.5 MG/DL — SIGNIFICANT CHANGE UP (ref 8.4–10.5)
CHLORIDE SERPL-SCNC: 102 MMOL/L — SIGNIFICANT CHANGE UP (ref 96–108)
CO2 SERPL-SCNC: 22 MMOL/L — SIGNIFICANT CHANGE UP (ref 22–31)
CREAT SERPL-MCNC: 0.76 MG/DL — SIGNIFICANT CHANGE UP (ref 0.5–1.3)
EGFR: 86 ML/MIN/1.73M2 — SIGNIFICANT CHANGE UP
EOSINOPHIL # BLD AUTO: 0.11 K/UL — SIGNIFICANT CHANGE UP (ref 0–0.5)
EOSINOPHIL NFR BLD AUTO: 1.4 % — SIGNIFICANT CHANGE UP (ref 0–6)
GLUCOSE BLDC GLUCOMTR-MCNC: 177 MG/DL — HIGH (ref 70–99)
GLUCOSE BLDC GLUCOMTR-MCNC: 192 MG/DL — HIGH (ref 70–99)
GLUCOSE BLDC GLUCOMTR-MCNC: 241 MG/DL — HIGH (ref 70–99)
GLUCOSE BLDC GLUCOMTR-MCNC: 282 MG/DL — HIGH (ref 70–99)
GLUCOSE SERPL-MCNC: 157 MG/DL — HIGH (ref 70–99)
HCT VFR BLD CALC: 44.5 % — SIGNIFICANT CHANGE UP (ref 34.5–45)
HGB BLD-MCNC: 14 G/DL — SIGNIFICANT CHANGE UP (ref 11.5–15.5)
IMM GRANULOCYTES NFR BLD AUTO: 0.4 % — SIGNIFICANT CHANGE UP (ref 0–0.9)
LYMPHOCYTES # BLD AUTO: 2.13 K/UL — SIGNIFICANT CHANGE UP (ref 1–3.3)
LYMPHOCYTES # BLD AUTO: 27.7 % — SIGNIFICANT CHANGE UP (ref 13–44)
MAGNESIUM SERPL-MCNC: 2.1 MG/DL — SIGNIFICANT CHANGE UP (ref 1.6–2.6)
MCHC RBC-ENTMCNC: 25.8 PG — LOW (ref 27–34)
MCHC RBC-ENTMCNC: 31.5 GM/DL — LOW (ref 32–36)
MCV RBC AUTO: 82.1 FL — SIGNIFICANT CHANGE UP (ref 80–100)
MONOCYTES # BLD AUTO: 0.57 K/UL — SIGNIFICANT CHANGE UP (ref 0–0.9)
MONOCYTES NFR BLD AUTO: 7.4 % — SIGNIFICANT CHANGE UP (ref 2–14)
NEUTROPHILS # BLD AUTO: 4.82 K/UL — SIGNIFICANT CHANGE UP (ref 1.8–7.4)
NEUTROPHILS NFR BLD AUTO: 62.7 % — SIGNIFICANT CHANGE UP (ref 43–77)
NRBC # BLD: 0 /100 WBCS — SIGNIFICANT CHANGE UP (ref 0–0)
PHOSPHATE SERPL-MCNC: 3.3 MG/DL — SIGNIFICANT CHANGE UP (ref 2.5–4.5)
PLATELET # BLD AUTO: 308 K/UL — SIGNIFICANT CHANGE UP (ref 150–400)
POTASSIUM SERPL-MCNC: 4.3 MMOL/L — SIGNIFICANT CHANGE UP (ref 3.5–5.3)
POTASSIUM SERPL-SCNC: 4.3 MMOL/L — SIGNIFICANT CHANGE UP (ref 3.5–5.3)
RBC # BLD: 5.42 M/UL — HIGH (ref 3.8–5.2)
RBC # FLD: 13.2 % — SIGNIFICANT CHANGE UP (ref 10.3–14.5)
SODIUM SERPL-SCNC: 137 MMOL/L — SIGNIFICANT CHANGE UP (ref 135–145)
WBC # BLD: 7.69 K/UL — SIGNIFICANT CHANGE UP (ref 3.8–10.5)
WBC # FLD AUTO: 7.69 K/UL — SIGNIFICANT CHANGE UP (ref 3.8–10.5)

## 2024-02-05 PROCEDURE — 99232 SBSQ HOSP IP/OBS MODERATE 35: CPT | Mod: GC

## 2024-02-05 PROCEDURE — 93306 TTE W/DOPPLER COMPLETE: CPT | Mod: 26

## 2024-02-05 PROCEDURE — 76376 3D RENDER W/INTRP POSTPROCES: CPT | Mod: 26

## 2024-02-05 PROCEDURE — 93356 MYOCRD STRAIN IMG SPCKL TRCK: CPT

## 2024-02-05 RX ORDER — LOSARTAN POTASSIUM 100 MG/1
100 TABLET, FILM COATED ORAL DAILY
Refills: 0 | Status: DISCONTINUED | OUTPATIENT
Start: 2024-02-06 | End: 2024-02-07

## 2024-02-05 RX ORDER — HYDRALAZINE HCL 50 MG
10 TABLET ORAL ONCE
Refills: 0 | Status: COMPLETED | OUTPATIENT
Start: 2024-02-05 | End: 2024-02-05

## 2024-02-05 RX ORDER — LOSARTAN POTASSIUM 100 MG/1
1 TABLET, FILM COATED ORAL
Refills: 0 | DISCHARGE

## 2024-02-05 RX ORDER — LOSARTAN POTASSIUM 100 MG/1
50 TABLET, FILM COATED ORAL ONCE
Refills: 0 | Status: COMPLETED | OUTPATIENT
Start: 2024-02-05 | End: 2024-02-05

## 2024-02-05 RX ORDER — ATORVASTATIN CALCIUM 80 MG/1
1 TABLET, FILM COATED ORAL
Qty: 30 | Refills: 2
Start: 2024-02-05

## 2024-02-05 RX ORDER — ATORVASTATIN CALCIUM 80 MG/1
1 TABLET, FILM COATED ORAL
Refills: 0 | DISCHARGE

## 2024-02-05 RX ORDER — ASPIRIN/CALCIUM CARB/MAGNESIUM 324 MG
1 TABLET ORAL
Qty: 0 | Refills: 0 | DISCHARGE
Start: 2024-02-05

## 2024-02-05 RX ORDER — LOSARTAN POTASSIUM 100 MG/1
1 TABLET, FILM COATED ORAL
Qty: 30 | Refills: 2
Start: 2024-02-05

## 2024-02-05 RX ADMIN — LOSARTAN POTASSIUM 50 MILLIGRAM(S): 100 TABLET, FILM COATED ORAL at 05:16

## 2024-02-05 RX ADMIN — Medication 10 MILLIGRAM(S): at 21:30

## 2024-02-05 RX ADMIN — Medication 12 UNIT(S): at 12:05

## 2024-02-05 RX ADMIN — Medication 1: at 07:55

## 2024-02-05 RX ADMIN — Medication 1: at 12:06

## 2024-02-05 RX ADMIN — Medication 12 UNIT(S): at 07:54

## 2024-02-05 RX ADMIN — Medication 3: at 18:48

## 2024-02-05 RX ADMIN — INSULIN GLARGINE 18 UNIT(S): 100 INJECTION, SOLUTION SUBCUTANEOUS at 22:32

## 2024-02-05 RX ADMIN — LOSARTAN POTASSIUM 50 MILLIGRAM(S): 100 TABLET, FILM COATED ORAL at 12:03

## 2024-02-05 RX ADMIN — ATORVASTATIN CALCIUM 20 MILLIGRAM(S): 80 TABLET, FILM COATED ORAL at 21:30

## 2024-02-05 RX ADMIN — Medication 12 UNIT(S): at 18:48

## 2024-02-05 RX ADMIN — Medication 81 MILLIGRAM(S): at 12:17

## 2024-02-05 NOTE — DISCHARGE NOTE NURSING/CASE MANAGEMENT/SOCIAL WORK - NSDCFUADDAPPT_GEN_ALL_CORE_FT
APPTS ARE READY TO BE MADE: [ ] YES    Best Family or Patient Contact (if needed):    Additional Information about above appointments (if needed):    1: Please follow up with your primary care doctor within 2 weeks of discharge to review your hospitalization.  2: Please follow up with Dr. Garcia of neurology within 1 weeks of discharge for brain MRI  3: Please follow up with endocrinology within 2 weeks of discharge for further management of diabetes    Other comments or requests:

## 2024-02-05 NOTE — PROGRESS NOTE ADULT - ATTENDING COMMENTS
68 year old F with PMHx of DM2, CAD, HTN, HLD, arthritis presented with unwitnessed fall on 2/3. CT imaging showed multifocal atherosclerotic narrowing without evidence of acute infarct, likely not contributing to fall. No focal neurological deficits on exam. Vitals significant for hypertension to 202/117,  Admitted for syncope workup, r/o cardiac causes vs stroke.   - no events on tele, remain in sinus, TTE WNL  - CTH neg, CTA showing mod-severe multifocal atherosclerotic narrowing in MI, PCA  -  per neurology, incidental findings, unlikely to be contributing to symptoms   - f/u neuro regarding ?need for MRI brain   - PT recs no needs  - d/c planning pending neuro recs     36 minutes spent coordinating discharge

## 2024-02-05 NOTE — DISCHARGE NOTE NURSING/CASE MANAGEMENT/SOCIAL WORK - PATIENT PORTAL LINK FT
You can access the FollowMyHealth Patient Portal offered by [facility] by registering at the following website: [email or web address]. By joining [employer]’s FollowMyHealth portal, you will also be able to view your health information using other applications (apps) compatible with our system.

## 2024-02-05 NOTE — PROGRESS NOTE ADULT - PROBLEM SELECTOR PLAN 3
On admission, /117 at 12PM, 190/76 at 6PM.     Plan:   -c/w home Losartan 50mg daily  - Lower BP by no more than 25% of MAP within first few hours On admission, /117 at 12PM, 190/76 at 6PM.     Plan:   -Losartan increased to 100mg

## 2024-02-05 NOTE — PROGRESS NOTE ADULT - SUBJECTIVE AND OBJECTIVE BOX
*******************************  Chris Franz, PGY-1  Internal Medicine  Contact via [employer] TEAMS    *******************************    PROGRESS NOTE:     Patient is a 68y old  Female who presents with a chief complaint of unwitnessed fall (04 Feb 2024 13:29)      INTERVAL EVENTS: No acute overnight events.     SUBJECTIVE: Patient seen and examined at bedside. This morning, the patient is comfortable and doing well. No acute complaints. Denies fevers, chills, N/V/D, chest pain, SOB, abdominal pain.    MEDICATIONS  (STANDING):  aspirin  chewable 81 milliGRAM(s) Oral daily  atorvastatin 20 milliGRAM(s) Oral at bedtime  dextrose 5%. 1000 milliLiter(s) (50 mL/Hr) IV Continuous <Continuous>  dextrose 5%. 1000 milliLiter(s) (100 mL/Hr) IV Continuous <Continuous>  dextrose 50% Injectable 25 Gram(s) IV Push once  dextrose 50% Injectable 25 Gram(s) IV Push once  dextrose 50% Injectable 12.5 Gram(s) IV Push once  glucagon  Injectable 1 milliGRAM(s) IntraMuscular once  insulin glargine Injectable (LANTUS) 18 Unit(s) SubCutaneous at bedtime  insulin lispro (ADMELOG) corrective regimen sliding scale   SubCutaneous three times a day before meals  insulin lispro (ADMELOG) corrective regimen sliding scale   SubCutaneous at bedtime  insulin lispro Injectable (ADMELOG) 12 Unit(s) SubCutaneous three times a day before meals  losartan 50 milliGRAM(s) Oral daily    MEDICATIONS  (PRN):  dextrose Oral Gel 15 Gram(s) Oral once PRN Blood Glucose LESS THAN 70 milliGRAM(s)/deciliter      CAPILLARY BLOOD GLUCOSE      POCT Blood Glucose.: 184 mg/dL (04 Feb 2024 21:29)  POCT Blood Glucose.: 239 mg/dL (04 Feb 2024 18:24)  POCT Blood Glucose.: 104 mg/dL (04 Feb 2024 12:41)  POCT Blood Glucose.: 178 mg/dL (04 Feb 2024 11:13)    I&O's Summary      PHYSICAL EXAM:  Vital Signs Last 24 Hrs  T(C): 36.6 (05 Feb 2024 04:59), Max: 36.8 (04 Feb 2024 08:41)  T(F): 97.9 (05 Feb 2024 04:59), Max: 98.3 (04 Feb 2024 08:41)  HR: 67 (05 Feb 2024 04:59) (67 - 73)  BP: 164/79 (05 Feb 2024 04:59) (157/60 - 170/98)  BP(mean): --  RR: 17 (05 Feb 2024 04:59) (17 - 19)  SpO2: 97% (05 Feb 2024 04:59) (96% - 100%)    Parameters below as of 05 Feb 2024 04:59  Patient On (Oxygen Delivery Method): room air        GENERAL: NAD, lying in bed comfortably  HEAD: Atraumatic, normocephalic  EYES: EOMI, PERRLA, conjunctiva and sclera clear  ENT: Moist mucous membranes  NECK: Supple, no JVD  HEART: S1, S2, Regular rate and rhythm, no murmurs, rubs, or gallops  LUNGS: Unlabored respirations, clear to auscultation bilaterally, no crackles, wheezing, or rhonchi  ABDOMEN: Soft, nontender, nondistended, +BS  EXTREMITIES: 2+ peripheral pulses bilaterally. No clubbing, cyanosis, or edema  NERVOUS SYSTEM:  A&Ox3, no focal deficits   SKIN: No rashes or lesions    LABS:                        13.5   7.58  )-----------( 282      ( 04 Feb 2024 06:55 )             42.6     02-04    139  |  104  |  10  ----------------------------<  164<H>  3.7   |  24  |  0.67    Ca    9.4      04 Feb 2024 06:55  Mg     1.9     02-04    TPro  7.3  /  Alb  4.3  /  TBili  0.5  /  DBili  x   /  AST  21  /  ALT  17  /  AlkPhos  113  02-03    PT/INR - ( 03 Feb 2024 12:49 )   PT: 11.1 sec;   INR: 1.01 ratio         PTT - ( 03 Feb 2024 12:49 )  PTT:27.9 sec      Urinalysis Basic - ( 04 Feb 2024 06:55 )    Color: x / Appearance: x / SG: x / pH: x  Gluc: 164 mg/dL / Ketone: x  / Bili: x / Urobili: x   Blood: x / Protein: x / Nitrite: x   Leuk Esterase: x / RBC: x / WBC x   Sq Epi: x / Non Sq Epi: x / Bacteria: x          RADIOLOGY & ADDITIONAL TESTS:  Results Reviewed:   Imaging Personally Reviewed:  Electrocardiogram Personally Reviewed:  Tele: *******************************  Chris Franz, PGY-1  Internal Medicine  Contact via [employer] TEAMS    *******************************    PROGRESS NOTE:     Patient is a 68y old  Female who presents with a chief complaint of unwitnessed fall (04 Feb 2024 13:29)      INTERVAL EVENTS: No acute overnight events.     SUBJECTIVE: Patient seen and examined at bedside. This morning, the patient is comfortable and doing well. No acute complaints. Denies fevers, chills, N/V/D, chest pain, SOB, abdominal pain.    MEDICATIONS  (STANDING):  aspirin  chewable 81 milliGRAM(s) Oral daily  atorvastatin 20 milliGRAM(s) Oral at bedtime  dextrose 5%. 1000 milliLiter(s) (50 mL/Hr) IV Continuous <Continuous>  dextrose 5%. 1000 milliLiter(s) (100 mL/Hr) IV Continuous <Continuous>  dextrose 50% Injectable 25 Gram(s) IV Push once  dextrose 50% Injectable 25 Gram(s) IV Push once  dextrose 50% Injectable 12.5 Gram(s) IV Push once  glucagon  Injectable 1 milliGRAM(s) IntraMuscular once  insulin glargine Injectable (LANTUS) 18 Unit(s) SubCutaneous at bedtime  insulin lispro (ADMELOG) corrective regimen sliding scale   SubCutaneous three times a day before meals  insulin lispro (ADMELOG) corrective regimen sliding scale   SubCutaneous at bedtime  insulin lispro Injectable (ADMELOG) 12 Unit(s) SubCutaneous three times a day before meals  losartan 50 milliGRAM(s) Oral daily    MEDICATIONS  (PRN):  dextrose Oral Gel 15 Gram(s) Oral once PRN Blood Glucose LESS THAN 70 milliGRAM(s)/deciliter      CAPILLARY BLOOD GLUCOSE      POCT Blood Glucose.: 184 mg/dL (04 Feb 2024 21:29)  POCT Blood Glucose.: 239 mg/dL (04 Feb 2024 18:24)  POCT Blood Glucose.: 104 mg/dL (04 Feb 2024 12:41)  POCT Blood Glucose.: 178 mg/dL (04 Feb 2024 11:13)    I&O's Summary      PHYSICAL EXAM:  Vital Signs Last 24 Hrs  T(C): 36.6 (05 Feb 2024 04:59), Max: 36.8 (04 Feb 2024 08:41)  T(F): 97.9 (05 Feb 2024 04:59), Max: 98.3 (04 Feb 2024 08:41)  HR: 67 (05 Feb 2024 04:59) (67 - 73)  BP: 164/79 (05 Feb 2024 04:59) (157/60 - 170/98)  BP(mean): --  RR: 17 (05 Feb 2024 04:59) (17 - 19)  SpO2: 97% (05 Feb 2024 04:59) (96% - 100%)    Parameters below as of 05 Feb 2024 04:59  Patient On (Oxygen Delivery Method): room air        GENERAL: NAD, lying in bed comfortably  HEAD: normocephalic  EYES: EOMI, conjunctiva and sclera clear  ENT: Moist mucous membranes  NECK: Supple, no JVD  HEART: S1, S2, Regular rate and rhythm, no murmurs, rubs, or gallops  LUNGS: Unlabored respirations, clear to auscultation bilaterally, no crackles, wheezing, or rhonchi  ABDOMEN: Soft, nontender, nondistended  EXTREMITIES: No LE edema  NERVOUS SYSTEM:  A&Ox3, no focal deficits   SKIN: No lesions    LABS:                        13.5   7.58  )-----------( 282      ( 04 Feb 2024 06:55 )             42.6     02-04    139  |  104  |  10  ----------------------------<  164<H>  3.7   |  24  |  0.67    Ca    9.4      04 Feb 2024 06:55  Mg     1.9     02-04    TPro  7.3  /  Alb  4.3  /  TBili  0.5  /  DBili  x   /  AST  21  /  ALT  17  /  AlkPhos  113  02-03    PT/INR - ( 03 Feb 2024 12:49 )   PT: 11.1 sec;   INR: 1.01 ratio         PTT - ( 03 Feb 2024 12:49 )  PTT:27.9 sec      Urinalysis Basic - ( 04 Feb 2024 06:55 )    Color: x / Appearance: x / SG: x / pH: x  Gluc: 164 mg/dL / Ketone: x  / Bili: x / Urobili: x   Blood: x / Protein: x / Nitrite: x   Leuk Esterase: x / RBC: x / WBC x   Sq Epi: x / Non Sq Epi: x / Bacteria: x          RADIOLOGY & ADDITIONAL TESTS:  Results Reviewed:   Imaging Personally Reviewed:  Electrocardiogram Personally Reviewed:  Tele:

## 2024-02-05 NOTE — PROGRESS NOTE ADULT - PROBLEM SELECTOR PLAN 6
Lipid panel with LDL 92.   Plan:  -c/w home Atorvastatin 20mg daily  -Consider ASA 81 for stroke risk reduction Lipid panel with LDL 92.   Plan:  -Increase atorvastatin to 40mg qd per neuro reccs  -Consider ASA 81 for stroke risk reduction

## 2024-02-05 NOTE — PROGRESS NOTE ADULT - PROBLEM SELECTOR PLAN 4
A1c 11.6     Plan:  -c/w Lantus 18u qhs  -c/w Admelog 12u TID  -Low ISS A1c 11.6     Plan:  -c/w Lantus 18u qhs  -c/w Admelog 12u TID  -Low ISS  -Patient referred to endocrinology on dc

## 2024-02-05 NOTE — PROGRESS NOTE ADULT - PROBLEM SELECTOR PLAN 1
-Presented with fall on face, pt does not recall how she fall. reports blurry vision during event.  - EKG reviewed and showed ST and T-wave changes, troponin 12, less concern for ACS. May consider outpatient cards follow up   - CT head showed multifocal atherosclerotic disease without evidence of acute infarcts, less likely contributing to fall  - neurology following,    Plan:   -Fall precautions   -Orthostatic vital signs  -Monitor on telemetry  -check TTE to r/o cardiac causes of syncope   -Pending MRI brain to r/o stroke  -Upon dc, followup with Dr. Garcia or Neurology resident clinic -Presented with fall on face, pt does not recall how she fall. reports blurry vision during event.  - EKG reviewed and showed ST and T-wave changes, troponin 12, less concern for ACS. May consider outpatient cards follow up   - CT head showed multifocal atherosclerotic disease without evidence of acute infarcts, less likely contributing to fall  - neurology following,    Plan:   -Fall precautions   -Orthostatic vital signs  -Monitor on telemetry  -TTE grossly normal  -MRI brain to be done outpatient  -Patient to be dc'ed with close follow-up  -Upon dc, followup with Dr. Garcia

## 2024-02-06 LAB
ANION GAP SERPL CALC-SCNC: 12 MMOL/L — SIGNIFICANT CHANGE UP (ref 5–17)
BUN SERPL-MCNC: 15 MG/DL — SIGNIFICANT CHANGE UP (ref 7–23)
CALCIUM SERPL-MCNC: 9.7 MG/DL — SIGNIFICANT CHANGE UP (ref 8.4–10.5)
CHLORIDE SERPL-SCNC: 99 MMOL/L — SIGNIFICANT CHANGE UP (ref 96–108)
CO2 SERPL-SCNC: 23 MMOL/L — SIGNIFICANT CHANGE UP (ref 22–31)
CREAT SERPL-MCNC: 0.56 MG/DL — SIGNIFICANT CHANGE UP (ref 0.5–1.3)
EGFR: 101 ML/MIN/1.73M2 — SIGNIFICANT CHANGE UP
GLUCOSE BLDC GLUCOMTR-MCNC: 107 MG/DL — HIGH (ref 70–99)
GLUCOSE BLDC GLUCOMTR-MCNC: 133 MG/DL — HIGH (ref 70–99)
GLUCOSE BLDC GLUCOMTR-MCNC: 135 MG/DL — HIGH (ref 70–99)
GLUCOSE BLDC GLUCOMTR-MCNC: 141 MG/DL — HIGH (ref 70–99)
GLUCOSE BLDC GLUCOMTR-MCNC: 163 MG/DL — HIGH (ref 70–99)
GLUCOSE SERPL-MCNC: 301 MG/DL — HIGH (ref 70–99)
HCT VFR BLD CALC: 41.5 % — SIGNIFICANT CHANGE UP (ref 34.5–45)
HGB BLD-MCNC: 12.9 G/DL — SIGNIFICANT CHANGE UP (ref 11.5–15.5)
MCHC RBC-ENTMCNC: 25.8 PG — LOW (ref 27–34)
MCHC RBC-ENTMCNC: 31.1 GM/DL — LOW (ref 32–36)
MCV RBC AUTO: 83 FL — SIGNIFICANT CHANGE UP (ref 80–100)
NRBC # BLD: 0 /100 WBCS — SIGNIFICANT CHANGE UP (ref 0–0)
PLATELET # BLD AUTO: 266 K/UL — SIGNIFICANT CHANGE UP (ref 150–400)
POTASSIUM SERPL-MCNC: 4 MMOL/L — SIGNIFICANT CHANGE UP (ref 3.5–5.3)
POTASSIUM SERPL-SCNC: 4 MMOL/L — SIGNIFICANT CHANGE UP (ref 3.5–5.3)
RBC # BLD: 5 M/UL — SIGNIFICANT CHANGE UP (ref 3.8–5.2)
RBC # FLD: 13.1 % — SIGNIFICANT CHANGE UP (ref 10.3–14.5)
SODIUM SERPL-SCNC: 134 MMOL/L — LOW (ref 135–145)
WBC # BLD: 5.8 K/UL — SIGNIFICANT CHANGE UP (ref 3.8–10.5)
WBC # FLD AUTO: 5.8 K/UL — SIGNIFICANT CHANGE UP (ref 3.8–10.5)

## 2024-02-06 PROCEDURE — 99239 HOSP IP/OBS DSCHRG MGMT >30: CPT | Mod: GC

## 2024-02-06 RX ORDER — ACETAMINOPHEN 500 MG
650 TABLET ORAL ONCE
Refills: 0 | Status: COMPLETED | OUTPATIENT
Start: 2024-02-06 | End: 2024-02-06

## 2024-02-06 RX ORDER — CHLORHEXIDINE GLUCONATE 213 G/1000ML
1 SOLUTION TOPICAL
Refills: 0 | Status: DISCONTINUED | OUTPATIENT
Start: 2024-02-06 | End: 2024-02-07

## 2024-02-06 RX ORDER — INFLUENZA VIRUS VACCINE 15; 15; 15; 15 UG/.5ML; UG/.5ML; UG/.5ML; UG/.5ML
0.7 SUSPENSION INTRAMUSCULAR ONCE
Refills: 0 | Status: DISCONTINUED | OUTPATIENT
Start: 2024-02-06 | End: 2024-02-07

## 2024-02-06 RX ORDER — AMLODIPINE BESYLATE 2.5 MG/1
5 TABLET ORAL ONCE
Refills: 0 | Status: COMPLETED | OUTPATIENT
Start: 2024-02-06 | End: 2024-02-06

## 2024-02-06 RX ORDER — AMLODIPINE BESYLATE 2.5 MG/1
1 TABLET ORAL
Qty: 30 | Refills: 2
Start: 2024-02-06

## 2024-02-06 RX ADMIN — Medication 12 UNIT(S): at 12:45

## 2024-02-06 RX ADMIN — LOSARTAN POTASSIUM 100 MILLIGRAM(S): 100 TABLET, FILM COATED ORAL at 05:45

## 2024-02-06 RX ADMIN — CHLORHEXIDINE GLUCONATE 1 APPLICATION(S): 213 SOLUTION TOPICAL at 12:51

## 2024-02-06 RX ADMIN — Medication 650 MILLIGRAM(S): at 09:04

## 2024-02-06 RX ADMIN — Medication 12 UNIT(S): at 17:04

## 2024-02-06 RX ADMIN — AMLODIPINE BESYLATE 5 MILLIGRAM(S): 2.5 TABLET ORAL at 13:03

## 2024-02-06 RX ADMIN — INSULIN GLARGINE 18 UNIT(S): 100 INJECTION, SOLUTION SUBCUTANEOUS at 22:52

## 2024-02-06 RX ADMIN — ATORVASTATIN CALCIUM 20 MILLIGRAM(S): 80 TABLET, FILM COATED ORAL at 22:52

## 2024-02-06 RX ADMIN — Medication 12 UNIT(S): at 08:44

## 2024-02-06 RX ADMIN — Medication 81 MILLIGRAM(S): at 12:49

## 2024-02-06 NOTE — PROGRESS NOTE ADULT - PROBLEM SELECTOR PLAN 3
On admission, /117 at 12PM, 190/76 at 6PM.     Plan:   -Losartan increased to 100mg On admission, /117 at 12PM, 190/76 at 6PM.     Plan:   -Losartan increased to 100mg  -Patient started on amlodipine 5mg  -Will f/u with PCP on dc

## 2024-02-06 NOTE — PROGRESS NOTE ADULT - PROBLEM SELECTOR PLAN 6
Lipid panel with LDL 92.   Plan:  -Increase atorvastatin to 40mg qd per neuro reccs  -Consider ASA 81 for stroke risk reduction

## 2024-02-06 NOTE — PROGRESS NOTE ADULT - PROBLEM SELECTOR PLAN 1
-Presented with fall on face, pt does not recall how she fall. reports blurry vision during event.  - EKG reviewed and showed ST and T-wave changes, troponin 12, less concern for ACS. May consider outpatient cards follow up   - CT head showed multifocal atherosclerotic disease without evidence of acute infarcts, less likely contributing to fall  - neurology following,    Plan:   -Fall precautions   -Orthostatic vital signs  -Monitor on telemetry  -TTE grossly normal  -MRI brain to be done outpatient  -Patient to be dc'ed with close follow-up  -Upon dc, followup with Dr. Garcia

## 2024-02-06 NOTE — PROGRESS NOTE ADULT - SUBJECTIVE AND OBJECTIVE BOX
*******************************  Chris Franz, PGY-1  Internal Medicine  Contact via [employer] TEAMS    *******************************    PROGRESS NOTE:     Patient is a 68y old  Female who presents with a chief complaint of unwitnessed fall (05 Feb 2024 07:24)      INTERVAL EVENTS: No acute overnight events.     SUBJECTIVE: Patient seen and examined at bedside. This morning, the patient is comfortable and doing well. No acute complaints. Denies fevers, chills, N/V/D, chest pain, SOB, abdominal pain.    MEDICATIONS  (STANDING):  aspirin  chewable 81 milliGRAM(s) Oral daily  atorvastatin 20 milliGRAM(s) Oral at bedtime  dextrose 5%. 1000 milliLiter(s) (50 mL/Hr) IV Continuous <Continuous>  dextrose 5%. 1000 milliLiter(s) (100 mL/Hr) IV Continuous <Continuous>  dextrose 50% Injectable 25 Gram(s) IV Push once  dextrose 50% Injectable 12.5 Gram(s) IV Push once  dextrose 50% Injectable 25 Gram(s) IV Push once  glucagon  Injectable 1 milliGRAM(s) IntraMuscular once  influenza  Vaccine (HIGH DOSE) 0.7 milliLiter(s) IntraMuscular once  insulin glargine Injectable (LANTUS) 18 Unit(s) SubCutaneous at bedtime  insulin lispro (ADMELOG) corrective regimen sliding scale   SubCutaneous three times a day before meals  insulin lispro (ADMELOG) corrective regimen sliding scale   SubCutaneous at bedtime  insulin lispro Injectable (ADMELOG) 12 Unit(s) SubCutaneous three times a day before meals  losartan 100 milliGRAM(s) Oral daily    MEDICATIONS  (PRN):  dextrose Oral Gel 15 Gram(s) Oral once PRN Blood Glucose LESS THAN 70 milliGRAM(s)/deciliter      CAPILLARY BLOOD GLUCOSE      POCT Blood Glucose.: 241 mg/dL (05 Feb 2024 22:00)  POCT Blood Glucose.: 282 mg/dL (05 Feb 2024 18:45)  POCT Blood Glucose.: 192 mg/dL (05 Feb 2024 12:04)    I&O's Summary    05 Feb 2024 07:01  -  06 Feb 2024 07:00  --------------------------------------------------------  IN: 250 mL / OUT: 0 mL / NET: 250 mL        PHYSICAL EXAM:  Vital Signs Last 24 Hrs  T(C): 37.1 (06 Feb 2024 05:41), Max: 37.1 (06 Feb 2024 05:41)  T(F): 98.7 (06 Feb 2024 05:41), Max: 98.7 (06 Feb 2024 05:41)  HR: 75 (06 Feb 2024 05:41) (73 - 84)  BP: 156/80 (06 Feb 2024 05:41) (156/80 - 192/81)  BP(mean): --  RR: 18 (06 Feb 2024 05:41) (16 - 18)  SpO2: 97% (06 Feb 2024 05:41) (97% - 98%)    Parameters below as of 06 Feb 2024 05:41  Patient On (Oxygen Delivery Method): room air        GENERAL: NAD, lying in bed comfortably  HEAD: Atraumatic, normocephalic  EYES: EOMI, PERRLA, conjunctiva and sclera clear  ENT: Moist mucous membranes  NECK: Supple, no JVD  HEART: S1, S2, Regular rate and rhythm, no murmurs, rubs, or gallops  LUNGS: Unlabored respirations, clear to auscultation bilaterally, no crackles, wheezing, or rhonchi  ABDOMEN: Soft, nontender, nondistended, +BS  EXTREMITIES: 2+ peripheral pulses bilaterally. No clubbing, cyanosis, or edema  NERVOUS SYSTEM:  A&Ox3, no focal deficits   SKIN: No rashes or lesions    LABS:                        14.0   7.69  )-----------( 308      ( 05 Feb 2024 07:39 )             44.5     02-05    137  |  102  |  20  ----------------------------<  157<H>  4.3   |  22  |  0.76    Ca    10.5      05 Feb 2024 07:39  Phos  3.3     02-05  Mg     2.1     02-05            Urinalysis Basic - ( 05 Feb 2024 07:39 )    Color: x / Appearance: x / SG: x / pH: x  Gluc: 157 mg/dL / Ketone: x  / Bili: x / Urobili: x   Blood: x / Protein: x / Nitrite: x   Leuk Esterase: x / RBC: x / WBC x   Sq Epi: x / Non Sq Epi: x / Bacteria: x          RADIOLOGY & ADDITIONAL TESTS:  Results Reviewed:   Imaging Personally Reviewed:  Electrocardiogram Personally Reviewed:  Tele: *******************************  Chris Franz, PGY-1  Internal Medicine  Contact via [employer] TEAMS    *******************************    PROGRESS NOTE:     Patient is a 68y old  Female who presents with a chief complaint of unwitnessed fall (05 Feb 2024 07:24)      INTERVAL EVENTS: No acute overnight events.     SUBJECTIVE: Patient seen and examined at bedside. This morning, the patient c/o pain in her hand and face where she fell. Denies fevers, chills, N/V/D, chest pain, SOB, abdominal pain.     MEDICATIONS  (STANDING):  aspirin  chewable 81 milliGRAM(s) Oral daily  atorvastatin 20 milliGRAM(s) Oral at bedtime  dextrose 5%. 1000 milliLiter(s) (50 mL/Hr) IV Continuous <Continuous>  dextrose 5%. 1000 milliLiter(s) (100 mL/Hr) IV Continuous <Continuous>  dextrose 50% Injectable 25 Gram(s) IV Push once  dextrose 50% Injectable 12.5 Gram(s) IV Push once  dextrose 50% Injectable 25 Gram(s) IV Push once  glucagon  Injectable 1 milliGRAM(s) IntraMuscular once  influenza  Vaccine (HIGH DOSE) 0.7 milliLiter(s) IntraMuscular once  insulin glargine Injectable (LANTUS) 18 Unit(s) SubCutaneous at bedtime  insulin lispro (ADMELOG) corrective regimen sliding scale   SubCutaneous three times a day before meals  insulin lispro (ADMELOG) corrective regimen sliding scale   SubCutaneous at bedtime  insulin lispro Injectable (ADMELOG) 12 Unit(s) SubCutaneous three times a day before meals  losartan 100 milliGRAM(s) Oral daily    MEDICATIONS  (PRN):  dextrose Oral Gel 15 Gram(s) Oral once PRN Blood Glucose LESS THAN 70 milliGRAM(s)/deciliter      CAPILLARY BLOOD GLUCOSE      POCT Blood Glucose.: 241 mg/dL (05 Feb 2024 22:00)  POCT Blood Glucose.: 282 mg/dL (05 Feb 2024 18:45)  POCT Blood Glucose.: 192 mg/dL (05 Feb 2024 12:04)    I&O's Summary    05 Feb 2024 07:01  -  06 Feb 2024 07:00  --------------------------------------------------------  IN: 250 mL / OUT: 0 mL / NET: 250 mL        PHYSICAL EXAM:  Vital Signs Last 24 Hrs  T(C): 37.1 (06 Feb 2024 05:41), Max: 37.1 (06 Feb 2024 05:41)  T(F): 98.7 (06 Feb 2024 05:41), Max: 98.7 (06 Feb 2024 05:41)  HR: 75 (06 Feb 2024 05:41) (73 - 84)  BP: 156/80 (06 Feb 2024 05:41) (156/80 - 192/81)  BP(mean): --  RR: 18 (06 Feb 2024 05:41) (16 - 18)  SpO2: 97% (06 Feb 2024 05:41) (97% - 98%)    Parameters below as of 06 Feb 2024 05:41  Patient On (Oxygen Delivery Method): room air        GENERAL: NAD, lying in bed comfortably  HEAD: normocephalic  EYES: EOMI, conjunctiva and sclera clear  ENT: Moist mucous membranes  NECK: Supple, no JVD  HEART: S1, S2, Regular rate and rhythm, no murmurs, rubs, or gallops  LUNGS: Unlabored respirations, clear to auscultation bilaterally, no crackles, wheezing, or rhonchi  ABDOMEN: Soft, nontender, nondistended  EXTREMITIES: No LE edema  NERVOUS SYSTEM:  A&Ox3, no focal deficits   SKIN: No rashes    LABS:                        14.0   7.69  )-----------( 308      ( 05 Feb 2024 07:39 )             44.5     02-05    137  |  102  |  20  ----------------------------<  157<H>  4.3   |  22  |  0.76    Ca    10.5      05 Feb 2024 07:39  Phos  3.3     02-05  Mg     2.1     02-05            Urinalysis Basic - ( 05 Feb 2024 07:39 )    Color: x / Appearance: x / SG: x / pH: x  Gluc: 157 mg/dL / Ketone: x  / Bili: x / Urobili: x   Blood: x / Protein: x / Nitrite: x   Leuk Esterase: x / RBC: x / WBC x   Sq Epi: x / Non Sq Epi: x / Bacteria: x          RADIOLOGY & ADDITIONAL TESTS:  Results Reviewed:   Imaging Personally Reviewed:  Electrocardiogram Personally Reviewed:  Tele:

## 2024-02-06 NOTE — PROGRESS NOTE ADULT - ASSESSMENT
68 year old F with PMHx of DM2, CAD, HTN, HLD, arthritis presented with unwitnessed fall on 2/3. CT imaging showed multifocal atherosclerotic narrowing without evidence of acute infarct, likely not contributing to fall. No focal neurological deficits on exam. Vitals significant for hypertension to 202/117,  Admitted for syncope workup, r/o cardiac causes vs stroke. 

## 2024-02-06 NOTE — PROVIDER CONTACT NOTE (OTHER) - ASSESSMENT
Pt AOx4. Pt free from symptomatic arrythmia. VS stable. Pt denies any chest pain, resp. distress, palpitations, lightheadedness/dizziness and/or headahce. O2sat stable on room air.

## 2024-02-06 NOTE — PROVIDER CONTACT NOTE (OTHER) - BACKGROUND
69 yo F admitted f0r the night with high blood pressure. s/p fall was d/joao and then Bp was high and daughter insisted on pt staying the night. hx of HTN, CAD, DM, syncope and CAD

## 2024-02-06 NOTE — PROGRESS NOTE ADULT - PROBLEM SELECTOR PLAN 4
A1c 11.6     Plan:  -c/w Lantus 18u qhs  -c/w Admelog 12u TID  -Low ISS  -Patient referred to endocrinology on dc

## 2024-02-06 NOTE — PATIENT PROFILE ADULT - FALL HARM RISK - HARM RISK INTERVENTIONS

## 2024-02-07 VITALS
DIASTOLIC BLOOD PRESSURE: 74 MMHG | OXYGEN SATURATION: 99 % | HEART RATE: 60 BPM | TEMPERATURE: 98 F | RESPIRATION RATE: 18 BRPM | SYSTOLIC BLOOD PRESSURE: 150 MMHG

## 2024-02-07 LAB
GLUCOSE BLDC GLUCOMTR-MCNC: 189 MG/DL — HIGH (ref 70–99)
MRSA PCR RESULT.: SIGNIFICANT CHANGE UP
S AUREUS DNA NOSE QL NAA+PROBE: DETECTED

## 2024-02-07 PROCEDURE — 73120 X-RAY EXAM OF HAND: CPT

## 2024-02-07 PROCEDURE — 70450 CT HEAD/BRAIN W/O DYE: CPT | Mod: MA

## 2024-02-07 PROCEDURE — 70498 CT ANGIOGRAPHY NECK: CPT | Mod: MA

## 2024-02-07 PROCEDURE — 74174 CTA ABD&PLVS W/CONTRAST: CPT | Mod: MA

## 2024-02-07 PROCEDURE — 70486 CT MAXILLOFACIAL W/O DYE: CPT | Mod: MA

## 2024-02-07 PROCEDURE — 36415 COLL VENOUS BLD VENIPUNCTURE: CPT

## 2024-02-07 PROCEDURE — 83690 ASSAY OF LIPASE: CPT

## 2024-02-07 PROCEDURE — 73110 X-RAY EXAM OF WRIST: CPT

## 2024-02-07 PROCEDURE — 80053 COMPREHEN METABOLIC PANEL: CPT

## 2024-02-07 PROCEDURE — 84484 ASSAY OF TROPONIN QUANT: CPT

## 2024-02-07 PROCEDURE — 80061 LIPID PANEL: CPT

## 2024-02-07 PROCEDURE — 87641 MR-STAPH DNA AMP PROBE: CPT

## 2024-02-07 PROCEDURE — 80307 DRUG TEST PRSMV CHEM ANLYZR: CPT

## 2024-02-07 PROCEDURE — 82803 BLOOD GASES ANY COMBINATION: CPT

## 2024-02-07 PROCEDURE — 84100 ASSAY OF PHOSPHORUS: CPT

## 2024-02-07 PROCEDURE — 83605 ASSAY OF LACTIC ACID: CPT

## 2024-02-07 PROCEDURE — 85610 PROTHROMBIN TIME: CPT

## 2024-02-07 PROCEDURE — 83735 ASSAY OF MAGNESIUM: CPT

## 2024-02-07 PROCEDURE — 73562 X-RAY EXAM OF KNEE 3: CPT

## 2024-02-07 PROCEDURE — 99285 EMERGENCY DEPT VISIT HI MDM: CPT

## 2024-02-07 PROCEDURE — 85027 COMPLETE CBC AUTOMATED: CPT

## 2024-02-07 PROCEDURE — 76377 3D RENDER W/INTRP POSTPROCES: CPT

## 2024-02-07 PROCEDURE — 93306 TTE W/DOPPLER COMPLETE: CPT

## 2024-02-07 PROCEDURE — 72170 X-RAY EXAM OF PELVIS: CPT

## 2024-02-07 PROCEDURE — 72125 CT NECK SPINE W/O DYE: CPT | Mod: MA

## 2024-02-07 PROCEDURE — 71045 X-RAY EXAM CHEST 1 VIEW: CPT

## 2024-02-07 PROCEDURE — 82962 GLUCOSE BLOOD TEST: CPT

## 2024-02-07 PROCEDURE — 70496 CT ANGIOGRAPHY HEAD: CPT | Mod: MA

## 2024-02-07 PROCEDURE — 81001 URINALYSIS AUTO W/SCOPE: CPT

## 2024-02-07 PROCEDURE — 93356 MYOCRD STRAIN IMG SPCKL TRCK: CPT

## 2024-02-07 PROCEDURE — 76376 3D RENDER W/INTRP POSTPROCES: CPT

## 2024-02-07 PROCEDURE — 71275 CT ANGIOGRAPHY CHEST: CPT | Mod: MA

## 2024-02-07 PROCEDURE — 70544 MR ANGIOGRAPHY HEAD W/O DYE: CPT

## 2024-02-07 PROCEDURE — 80048 BASIC METABOLIC PNL TOTAL CA: CPT

## 2024-02-07 PROCEDURE — 12051 INTMD RPR FACE/MM 2.5 CM/<: CPT

## 2024-02-07 PROCEDURE — 83036 HEMOGLOBIN GLYCOSYLATED A1C: CPT

## 2024-02-07 PROCEDURE — 85730 THROMBOPLASTIN TIME PARTIAL: CPT

## 2024-02-07 PROCEDURE — 97161 PT EVAL LOW COMPLEX 20 MIN: CPT

## 2024-02-07 PROCEDURE — 85025 COMPLETE CBC W/AUTO DIFF WBC: CPT

## 2024-02-07 PROCEDURE — 87640 STAPH A DNA AMP PROBE: CPT

## 2024-02-07 RX ADMIN — LOSARTAN POTASSIUM 100 MILLIGRAM(S): 100 TABLET, FILM COATED ORAL at 05:06

## 2024-02-07 RX ADMIN — Medication 1: at 08:33

## 2024-02-07 RX ADMIN — CHLORHEXIDINE GLUCONATE 1 APPLICATION(S): 213 SOLUTION TOPICAL at 05:08

## 2024-02-07 RX ADMIN — Medication 12 UNIT(S): at 08:32

## 2024-02-12 ENCOUNTER — APPOINTMENT (OUTPATIENT)
Dept: INTERNAL MEDICINE | Facility: CLINIC | Age: 69
End: 2024-02-12

## 2024-02-29 ENCOUNTER — APPOINTMENT (OUTPATIENT)
Dept: MRI IMAGING | Facility: CLINIC | Age: 69
End: 2024-02-29
Payer: MEDICAID

## 2024-02-29 ENCOUNTER — OUTPATIENT (OUTPATIENT)
Dept: OUTPATIENT SERVICES | Facility: HOSPITAL | Age: 69
LOS: 1 days | End: 2024-02-29
Payer: MEDICAID

## 2024-02-29 DIAGNOSIS — Z00.8 ENCOUNTER FOR OTHER GENERAL EXAMINATION: ICD-10-CM

## 2024-02-29 DIAGNOSIS — Z90.710 ACQUIRED ABSENCE OF BOTH CERVIX AND UTERUS: Chronic | ICD-10-CM

## 2024-02-29 DIAGNOSIS — Z90.49 ACQUIRED ABSENCE OF OTHER SPECIFIED PARTS OF DIGESTIVE TRACT: Chronic | ICD-10-CM

## 2024-02-29 PROCEDURE — 70551 MRI BRAIN STEM W/O DYE: CPT | Mod: 26

## 2024-02-29 PROCEDURE — 70551 MRI BRAIN STEM W/O DYE: CPT

## 2024-03-15 ENCOUNTER — APPOINTMENT (OUTPATIENT)
Dept: INTERNAL MEDICINE | Facility: CLINIC | Age: 69
End: 2024-03-15
Payer: MEDICAID

## 2024-03-15 ENCOUNTER — RESULT REVIEW (OUTPATIENT)
Age: 69
End: 2024-03-15

## 2024-03-15 ENCOUNTER — OUTPATIENT (OUTPATIENT)
Dept: OUTPATIENT SERVICES | Facility: HOSPITAL | Age: 69
LOS: 1 days | End: 2024-03-15
Payer: MEDICAID

## 2024-03-15 VITALS
WEIGHT: 125 LBS | HEIGHT: 57 IN | OXYGEN SATURATION: 96 % | DIASTOLIC BLOOD PRESSURE: 70 MMHG | BODY MASS INDEX: 26.97 KG/M2 | SYSTOLIC BLOOD PRESSURE: 140 MMHG | HEART RATE: 66 BPM

## 2024-03-15 DIAGNOSIS — Z00.00 ENCOUNTER FOR GENERAL ADULT MEDICAL EXAMINATION W/OUT ABNORMAL FINDINGS: ICD-10-CM

## 2024-03-15 DIAGNOSIS — Z90.710 ACQUIRED ABSENCE OF BOTH CERVIX AND UTERUS: Chronic | ICD-10-CM

## 2024-03-15 DIAGNOSIS — I10 ESSENTIAL (PRIMARY) HYPERTENSION: ICD-10-CM

## 2024-03-15 DIAGNOSIS — Z90.49 ACQUIRED ABSENCE OF OTHER SPECIFIED PARTS OF DIGESTIVE TRACT: Chronic | ICD-10-CM

## 2024-03-15 DIAGNOSIS — I67.89 OTHER CEREBROVASCULAR DISEASE: ICD-10-CM

## 2024-03-15 PROCEDURE — G0009: CPT

## 2024-03-15 PROCEDURE — G0463: CPT

## 2024-03-15 PROCEDURE — 99213 OFFICE O/P EST LOW 20 MIN: CPT | Mod: 25

## 2024-03-15 PROCEDURE — 90677 PCV20 VACCINE IM: CPT

## 2024-03-15 RX ORDER — ASPIRIN 81 MG
81 TABLET, DELAYED RELEASE (ENTERIC COATED) ORAL DAILY
Qty: 90 | Refills: 1 | Status: DISCONTINUED | COMMUNITY
Start: 1900-01-01 | End: 2024-03-15

## 2024-03-15 RX ORDER — INSULIN GLARGINE 100 [IU]/ML
100 INJECTION, SOLUTION SUBCUTANEOUS AT BEDTIME
Qty: 1 | Refills: 0 | Status: DISCONTINUED | COMMUNITY
Start: 2020-01-29 | End: 2024-03-15

## 2024-03-15 RX ORDER — ATORVASTATIN CALCIUM 20 MG/1
20 TABLET, FILM COATED ORAL DAILY
Qty: 90 | Refills: 3 | Status: DISCONTINUED | COMMUNITY
Start: 2020-08-07 | End: 2024-03-15

## 2024-03-15 RX ORDER — METFORMIN HYDROCHLORIDE 1000 MG/1
1000 TABLET, COATED ORAL
Qty: 180 | Refills: 3 | Status: ACTIVE | COMMUNITY
Start: 2024-03-15 | End: 1900-01-01

## 2024-03-15 RX ORDER — CHROMIUM 200 MCG
25 MCG TABLET ORAL
Qty: 90 | Refills: 3 | Status: DISCONTINUED | COMMUNITY
Start: 2021-02-02 | End: 2024-03-15

## 2024-03-15 RX ORDER — INSULIN GLARGINE 100 [IU]/ML
100 INJECTION, SOLUTION SUBCUTANEOUS AT BEDTIME
Qty: 1 | Refills: 3 | Status: ACTIVE | COMMUNITY
Start: 2024-03-15 | End: 1900-01-01

## 2024-03-15 RX ORDER — LOSARTAN POTASSIUM 50 MG/1
50 TABLET, FILM COATED ORAL
Qty: 15 | Refills: 0 | Status: DISCONTINUED | COMMUNITY
Start: 2021-01-28 | End: 2024-03-15

## 2024-03-15 RX ORDER — ASPIRIN 81 MG/1
81 TABLET, COATED ORAL
Qty: 90 | Refills: 1 | Status: DISCONTINUED | COMMUNITY
Start: 2020-08-23 | End: 2024-03-15

## 2024-03-15 RX ORDER — INSULIN LISPRO 100 U/ML
100 INJECTION, SOLUTION SUBCUTANEOUS
Qty: 1 | Refills: 3 | Status: ACTIVE | COMMUNITY
Start: 2024-03-15 | End: 1900-01-01

## 2024-03-15 RX ORDER — INSULIN ASPART 100 [IU]/ML
100 INJECTION, SOLUTION INTRAVENOUS; SUBCUTANEOUS
Qty: 1 | Refills: 6 | Status: DISCONTINUED | COMMUNITY
Start: 2021-01-26 | End: 2024-03-15

## 2024-03-15 RX ORDER — CEPHALEXIN 500 MG/1
500 TABLET ORAL 3 TIMES DAILY
Qty: 30 | Refills: 0 | Status: DISCONTINUED | COMMUNITY
Start: 2021-02-13 | End: 2024-03-15

## 2024-03-15 RX ORDER — ATORVASTATIN CALCIUM 40 MG/1
40 TABLET, FILM COATED ORAL
Qty: 1 | Refills: 3 | Status: ACTIVE | COMMUNITY
Start: 2024-03-15 | End: 1900-01-01

## 2024-03-15 RX ORDER — CHOLECALCIFEROL (VITAMIN D3) 25 MCG
25 MCG TABLET ORAL
Qty: 90 | Refills: 3 | Status: DISCONTINUED | COMMUNITY
Start: 2020-01-29 | End: 2024-03-15

## 2024-03-15 RX ORDER — ACETAMINOPHEN 500 MG/1
500 TABLET ORAL TWICE DAILY
Qty: 30 | Refills: 0 | Status: DISCONTINUED | COMMUNITY
Start: 2021-02-13 | End: 2024-03-15

## 2024-03-15 RX ORDER — AMLODIPINE BESYLATE 5 MG/1
5 TABLET ORAL DAILY
Qty: 90 | Refills: 3 | Status: ACTIVE | COMMUNITY
Start: 2024-03-15 | End: 1900-01-01

## 2024-03-15 NOTE — ASSESSMENT
[FreeTextEntry1] : 69 y/o F PMH HTN, T2DM, chronic microvascular disease of brain presents as new patient.  #HCM -pneumovax -colonoscopy referral -mammogram -saw ophtho 1 month ago -dexa scan -shingles vaccine at pharmacy  RTC in May for A1c check

## 2024-03-15 NOTE — HISTORY OF PRESENT ILLNESS
[FreeTextEntry1] : CPE [de-identified] : 68 F PMH, T2DM, HTN, HLD, presents as new patient. Patient's daughter is providing translation.    Patient recently admitted to hospital for unwitnessed fall. Patient does not remember what happened during the fall. Workup was notable for chronic microvascular changes on brain vessel imaging, otherwise unremarkable.    #T2DM  She had known diabetes upon presentation to the hospital already on insulin. A1c on 2/4 inpatient was 11.4. Her regimen was adjusted to basaglar 18u at bedtime and admelog 12u premeal. She was not sent out on metformin. She brings a log of her blood sugars and in the morning they range from the 90s to the 130s. She has been limiting her carbohydrate intake since being in the hospital.  #HTN  Was supposed to be sent out on amlodipine 5 and losartan 100mg however there was an issue with the amlodipine prescription and she has only been taking the losartan. BP today is 140/70.

## 2024-03-21 ENCOUNTER — APPOINTMENT (OUTPATIENT)
Dept: MAMMOGRAPHY | Facility: CLINIC | Age: 69
End: 2024-03-21
Payer: MEDICAID

## 2024-03-21 ENCOUNTER — NON-APPOINTMENT (OUTPATIENT)
Age: 69
End: 2024-03-21

## 2024-03-21 ENCOUNTER — RESULT REVIEW (OUTPATIENT)
Age: 69
End: 2024-03-21

## 2024-03-21 PROCEDURE — 77067 SCR MAMMO BI INCL CAD: CPT

## 2024-03-21 PROCEDURE — 77063 BREAST TOMOSYNTHESIS BI: CPT

## 2024-03-25 DIAGNOSIS — I67.89 OTHER CEREBROVASCULAR DISEASE: ICD-10-CM

## 2024-03-25 DIAGNOSIS — E11.9 TYPE 2 DIABETES MELLITUS WITHOUT COMPLICATIONS: ICD-10-CM

## 2024-03-25 DIAGNOSIS — Z23 ENCOUNTER FOR IMMUNIZATION: ICD-10-CM

## 2024-03-26 ENCOUNTER — RESULT REVIEW (OUTPATIENT)
Age: 69
End: 2024-03-26

## 2024-03-26 ENCOUNTER — OUTPATIENT (OUTPATIENT)
Dept: OUTPATIENT SERVICES | Facility: HOSPITAL | Age: 69
LOS: 1 days | End: 2024-03-26
Payer: MEDICAID

## 2024-03-26 ENCOUNTER — APPOINTMENT (OUTPATIENT)
Dept: ULTRASOUND IMAGING | Facility: IMAGING CENTER | Age: 69
End: 2024-03-26
Payer: MEDICAID

## 2024-03-26 DIAGNOSIS — Z90.710 ACQUIRED ABSENCE OF BOTH CERVIX AND UTERUS: Chronic | ICD-10-CM

## 2024-03-26 DIAGNOSIS — Z90.49 ACQUIRED ABSENCE OF OTHER SPECIFIED PARTS OF DIGESTIVE TRACT: Chronic | ICD-10-CM

## 2024-03-26 DIAGNOSIS — N63.0 UNSPECIFIED LUMP IN UNSPECIFIED BREAST: ICD-10-CM

## 2024-03-26 DIAGNOSIS — Z00.00 ENCOUNTER FOR GENERAL ADULT MEDICAL EXAMINATION WITHOUT ABNORMAL FINDINGS: ICD-10-CM

## 2024-03-26 PROCEDURE — 76642 ULTRASOUND BREAST LIMITED: CPT | Mod: 26,LT

## 2024-03-26 PROCEDURE — 76642 ULTRASOUND BREAST LIMITED: CPT

## 2024-04-04 ENCOUNTER — APPOINTMENT (OUTPATIENT)
Dept: GASTROENTEROLOGY | Facility: CLINIC | Age: 69
End: 2024-04-04
Payer: MEDICAID

## 2024-04-04 VITALS
TEMPERATURE: 97.3 F | SYSTOLIC BLOOD PRESSURE: 135 MMHG | OXYGEN SATURATION: 96 % | DIASTOLIC BLOOD PRESSURE: 75 MMHG | HEIGHT: 57 IN | HEART RATE: 67 BPM | WEIGHT: 120 LBS | BODY MASS INDEX: 25.89 KG/M2

## 2024-04-04 DIAGNOSIS — K59.09 OTHER CONSTIPATION: ICD-10-CM

## 2024-04-04 DIAGNOSIS — Z12.11 ENCOUNTER FOR SCREENING FOR MALIGNANT NEOPLASM OF COLON: ICD-10-CM

## 2024-04-04 DIAGNOSIS — Z12.12 ENCOUNTER FOR SCREENING FOR MALIGNANT NEOPLASM OF COLON: ICD-10-CM

## 2024-04-04 PROCEDURE — 99204 OFFICE O/P NEW MOD 45 MIN: CPT

## 2024-04-04 RX ORDER — POLYETHYLENE GLYCOL 3350, SODIUM CHLORIDE, SODIUM BICARBONATE AND POTASSIUM CHLORIDE WITH LEMON FLAVOR 420; 11.2; 5.72; 1.48 G/4L; G/4L; G/4L; G/4L
420 POWDER, FOR SOLUTION ORAL
Qty: 4000 | Refills: 0 | Status: ACTIVE | COMMUNITY
Start: 2024-04-04 | End: 1900-01-01

## 2024-04-04 RX ORDER — ASPIRIN 81 MG
81 TABLET, DELAYED RELEASE (ENTERIC COATED) ORAL
Refills: 0 | Status: ACTIVE | COMMUNITY

## 2024-04-04 NOTE — CONSULT LETTER
[Dear  ___] : Dear  [unfilled], [Consult Letter:] : I had the pleasure of evaluating your patient, [unfilled]. [Please see my note below.] : Please see my note below. [Consult Closing:] : Thank you very much for allowing me to participate in the care of this patient.  If you have any questions, please do not hesitate to contact me. [Sincerely,] : Sincerely, [FreeTextEntry3] : Huang Landry MD, FACG

## 2024-04-04 NOTE — PHYSICAL EXAM
[Alert] : alert [Normal Voice/Communication] : normal voice/communication [Healthy Appearing] : healthy appearing [No Acute Distress] : no acute distress [Sclera] : the sclera and conjunctiva were normal [Hearing Threshold Finger Rub Not [place]] : hearing was normal [Normal Lips/Gums] : the lips and gums were normal [Oropharynx] : the oropharynx was normal [Normal Appearance] : the appearance of the neck was normal [No Neck Mass] : no neck mass was observed [No Respiratory Distress] : no respiratory distress [No Acc Muscle Use] : no accessory muscle use [Respiration, Rhythm And Depth] : normal respiratory rhythm and effort [Auscultation Breath Sounds / Voice Sounds] : lungs were clear to auscultation bilaterally [Heart Rate And Rhythm] : heart rate was normal and rhythm regular [Normal S1, S2] : normal S1 and S2 [Murmurs] : no murmurs [None] : no edema [Bowel Sounds] : normal bowel sounds [Abdomen Tenderness] : non-tender [No Masses] : no abdominal mass palpated [Abdomen Soft] : soft [] : no hepatosplenomegaly [Cervical Lymph Nodes Enlarged Posterior Bilaterally] : no posterior cervical lymphadenopathy [Supraclavicular Lymph Nodes Enlarged Bilaterally] : no supraclavicular lymphadenopathy [No CVA Tenderness] : no CVA  tenderness [Abnormal Walk] : normal gait [Motor Exam] : the motor exam was normal [Normal] : oriented to person, place, and time [Oriented To Time, Place, And Person] : oriented to person, place, and time [Normal Affect] : the affect was normal

## 2024-04-04 NOTE — HISTORY OF PRESENT ILLNESS
Faxed medical clearance to Dr Demetri Lucero [FreeTextEntry1] : Daughter elena Colon acted as [facility] . 68-year-old diabetic woman with HTN and HLD presents for a screening colonoscopy.  This will be her first colonoscopy.  She denies rectal bleeding, melena or hematemesis.  She does complain of chronic constipation.  She was also she has coronary artery disease underwent testing several years ago.  The results however are not available.  She denies chest pain or shortness of breath.

## 2024-04-04 NOTE — ASSESSMENT
[FreeTextEntry1] : Colon cancer screening.  The importance of screening colonoscopy and the colonoscopy prep was discussed with the patient and her daughter.  She understands and all questions were answered. Chronic constipation. Dietary and lifestyle modification discussed with the patient.

## 2024-04-05 ENCOUNTER — NON-APPOINTMENT (OUTPATIENT)
Age: 69
End: 2024-04-05

## 2024-04-05 ENCOUNTER — APPOINTMENT (OUTPATIENT)
Dept: ULTRASOUND IMAGING | Facility: IMAGING CENTER | Age: 69
End: 2024-04-05
Payer: MEDICAID

## 2024-04-05 ENCOUNTER — RESULT REVIEW (OUTPATIENT)
Age: 69
End: 2024-04-05

## 2024-04-05 ENCOUNTER — OUTPATIENT (OUTPATIENT)
Dept: OUTPATIENT SERVICES | Facility: HOSPITAL | Age: 69
LOS: 1 days | End: 2024-04-05
Payer: MEDICAID

## 2024-04-05 DIAGNOSIS — N63.0 UNSPECIFIED LUMP IN UNSPECIFIED BREAST: ICD-10-CM

## 2024-04-05 DIAGNOSIS — Z90.710 ACQUIRED ABSENCE OF BOTH CERVIX AND UTERUS: Chronic | ICD-10-CM

## 2024-04-05 DIAGNOSIS — Z90.49 ACQUIRED ABSENCE OF OTHER SPECIFIED PARTS OF DIGESTIVE TRACT: Chronic | ICD-10-CM

## 2024-04-05 PROCEDURE — 77065 DX MAMMO INCL CAD UNI: CPT | Mod: 26,LT

## 2024-04-05 PROCEDURE — 88305 TISSUE EXAM BY PATHOLOGIST: CPT | Mod: 26

## 2024-04-05 PROCEDURE — 19083 BX BREAST 1ST LESION US IMAG: CPT

## 2024-04-05 PROCEDURE — 19083 BX BREAST 1ST LESION US IMAG: CPT | Mod: LT

## 2024-04-05 PROCEDURE — 77065 DX MAMMO INCL CAD UNI: CPT

## 2024-04-05 PROCEDURE — 88305 TISSUE EXAM BY PATHOLOGIST: CPT

## 2024-04-08 ENCOUNTER — LABORATORY RESULT (OUTPATIENT)
Age: 69
End: 2024-04-08

## 2024-04-09 LAB — SURGICAL PATHOLOGY STUDY: SIGNIFICANT CHANGE UP

## 2024-04-11 ENCOUNTER — APPOINTMENT (OUTPATIENT)
Dept: OBGYN | Facility: CLINIC | Age: 69
End: 2024-04-11
Payer: MEDICAID

## 2024-04-11 ENCOUNTER — OUTPATIENT (OUTPATIENT)
Dept: OUTPATIENT SERVICES | Facility: HOSPITAL | Age: 69
LOS: 1 days | End: 2024-04-11
Payer: MEDICAID

## 2024-04-11 ENCOUNTER — RESULT REVIEW (OUTPATIENT)
Age: 69
End: 2024-04-11

## 2024-04-11 VITALS — SYSTOLIC BLOOD PRESSURE: 124 MMHG | DIASTOLIC BLOOD PRESSURE: 76 MMHG | BODY MASS INDEX: 26.49 KG/M2 | WEIGHT: 122.4 LBS

## 2024-04-11 DIAGNOSIS — Z90.710 ACQUIRED ABSENCE OF BOTH CERVIX AND UTERUS: Chronic | ICD-10-CM

## 2024-04-11 DIAGNOSIS — Z87.898 PERSONAL HISTORY OF OTHER SPECIFIED CONDITIONS: ICD-10-CM

## 2024-04-11 DIAGNOSIS — Z90.710 ACQUIRED ABSENCE OF BOTH CERVIX AND UTERUS: ICD-10-CM

## 2024-04-11 DIAGNOSIS — N95.2 POSTMENOPAUSAL ATROPHIC VAGINITIS: ICD-10-CM

## 2024-04-11 DIAGNOSIS — Z01.419 ENCOUNTER FOR GYNECOLOGICAL EXAMINATION (GENERAL) (ROUTINE) W/OUT ABNORMAL FINDINGS: ICD-10-CM

## 2024-04-11 DIAGNOSIS — Z12.72 ENCOUNTER FOR SCREENING FOR MALIGNANT NEOPLASM OF VAGINA: ICD-10-CM

## 2024-04-11 DIAGNOSIS — N76.0 ACUTE VAGINITIS: ICD-10-CM

## 2024-04-11 DIAGNOSIS — Z90.49 ACQUIRED ABSENCE OF OTHER SPECIFIED PARTS OF DIGESTIVE TRACT: Chronic | ICD-10-CM

## 2024-04-11 PROCEDURE — G0438: CPT

## 2024-04-11 PROCEDURE — G0463: CPT

## 2024-04-11 PROCEDURE — 99387 INIT PM E/M NEW PAT 65+ YRS: CPT

## 2024-04-11 RX ORDER — ESTRADIOL 0.1 MG/G
0.1 CREAM VAGINAL
Qty: 1 | Refills: 2 | Status: ACTIVE | COMMUNITY
Start: 2024-04-11 | End: 1900-01-01

## 2024-04-11 NOTE — PHYSICAL EXAM
[Awake] : awake [Alert] : alert [Acute Distress] : no acute distress [Mass] : no breast mass [Nipple Discharge] : no nipple discharge [Axillary LAD] : no axillary lymphadenopathy [Soft] : soft [Tender] : non tender [Oriented x3] : oriented to person, place, and time [Normal] : vagina [No Bleeding] : there was no active vaginal bleeding [Pap Obtained] : a Pap smear was performed [Absent] : absent [Uterine Adnexae] : were not tender and not enlarged [FreeTextEntry5] : Vaginal pap collected

## 2024-04-12 ENCOUNTER — APPOINTMENT (OUTPATIENT)
Age: 69
End: 2024-04-12
Payer: COMMERCIAL

## 2024-04-12 PROCEDURE — D0120: CPT

## 2024-04-12 PROCEDURE — D1110 PROPHYLAXIS - ADULT: CPT

## 2024-04-12 NOTE — ED ADULT NURSE NOTE - CADM POA CENTRAL LINE
Digna from [employer] calling regarding Detailed Written Order received on 3/25/24. States was returned without signature. Per provider note below was signed by provider. Searched RightFax for form. Form was signed by provider and returned on 3/25/24. Per Digna, form that was received back was blank. Fax resent electronically to new number provided by Digna, [phone].     Camilla Patiño, VF    
Done please fax.       NATASHA Queen CNP  Questions or concerns please feel free to send me a [employer] message or call me  Phone : [phone]    
Duplicate request of forms.  Faxed to [phone]  
Forms faxed back to [phone], Attn: Everardo Oscar.     Copy sent to stat scanning.   
INCOMING FORMS    Sender: [employer]    Type of Form, letter or note (What is requested?): Written order for prosthetic    How was the form received?: Fax    How should forms be returned?:  Fax : [phone], Attn: Everardo Oscar.     Form placed in [facility] bin for review/signature if appropriate.     
No

## 2024-04-15 DIAGNOSIS — Z12.72 ENCOUNTER FOR SCREENING FOR MALIGNANT NEOPLASM OF VAGINA: ICD-10-CM

## 2024-04-15 DIAGNOSIS — Z87.898 PERSONAL HISTORY OF OTHER SPECIFIED CONDITIONS: ICD-10-CM

## 2024-04-15 DIAGNOSIS — Z01.419 ENCOUNTER FOR GYNECOLOGICAL EXAMINATION (GENERAL) (ROUTINE) WITHOUT ABNORMAL FINDINGS: ICD-10-CM

## 2024-04-15 DIAGNOSIS — N95.2 POSTMENOPAUSAL ATROPHIC VAGINITIS: ICD-10-CM

## 2024-04-15 DIAGNOSIS — Z90.710 ACQUIRED ABSENCE OF BOTH CERVIX AND UTERUS: ICD-10-CM

## 2024-04-18 RX ORDER — LOSARTAN POTASSIUM 100 MG/1
100 TABLET, FILM COATED ORAL DAILY
Qty: 1 | Refills: 3 | Status: ACTIVE | COMMUNITY
Start: 2024-03-15 | End: 1900-01-01

## 2024-04-29 ENCOUNTER — APPOINTMENT (OUTPATIENT)
Dept: RADIOLOGY | Facility: CLINIC | Age: 69
End: 2024-04-29
Payer: MEDICAID

## 2024-04-29 PROCEDURE — 77085 DXA BONE DENSITY AXL VRT FX: CPT

## 2024-05-01 ENCOUNTER — NON-APPOINTMENT (OUTPATIENT)
Age: 69
End: 2024-05-01

## 2024-05-01 DIAGNOSIS — M85.80 OTHER SPECIFIED DISORDERS OF BONE DENSITY AND STRUCTURE, UNSPECIFIED SITE: ICD-10-CM

## 2024-05-03 ENCOUNTER — APPOINTMENT (OUTPATIENT)
Dept: RADIOLOGY | Facility: CLINIC | Age: 69
End: 2024-05-03

## 2024-05-17 ENCOUNTER — APPOINTMENT (OUTPATIENT)
Age: 69
End: 2024-05-17

## 2024-05-24 ENCOUNTER — APPOINTMENT (OUTPATIENT)
Dept: INTERNAL MEDICINE | Facility: CLINIC | Age: 69
End: 2024-05-24

## 2024-05-31 ENCOUNTER — APPOINTMENT (OUTPATIENT)
Age: 69
End: 2024-05-31
Payer: COMMERCIAL

## 2024-05-31 PROCEDURE — D2391: CPT

## 2024-06-07 ENCOUNTER — OUTPATIENT (OUTPATIENT)
Dept: OUTPATIENT SERVICES | Facility: HOSPITAL | Age: 69
LOS: 1 days | End: 2024-06-07
Payer: MEDICAID

## 2024-06-07 ENCOUNTER — APPOINTMENT (OUTPATIENT)
Dept: INTERNAL MEDICINE | Facility: CLINIC | Age: 69
End: 2024-06-07
Payer: MEDICAID

## 2024-06-07 VITALS
HEIGHT: 57 IN | BODY MASS INDEX: 26.97 KG/M2 | HEART RATE: 62 BPM | OXYGEN SATURATION: 98 % | SYSTOLIC BLOOD PRESSURE: 118 MMHG | DIASTOLIC BLOOD PRESSURE: 60 MMHG | WEIGHT: 125 LBS

## 2024-06-07 DIAGNOSIS — E11.9 TYPE 2 DIABETES MELLITUS W/OUT COMPLICATIONS: ICD-10-CM

## 2024-06-07 DIAGNOSIS — I10 ESSENTIAL (PRIMARY) HYPERTENSION: ICD-10-CM

## 2024-06-07 DIAGNOSIS — Z90.49 ACQUIRED ABSENCE OF OTHER SPECIFIED PARTS OF DIGESTIVE TRACT: Chronic | ICD-10-CM

## 2024-06-07 DIAGNOSIS — Z90.710 ACQUIRED ABSENCE OF BOTH CERVIX AND UTERUS: Chronic | ICD-10-CM

## 2024-06-07 DIAGNOSIS — I25.10 ATHEROSCLEROTIC HEART DISEASE OF NATIVE CORONARY ARTERY W/OUT ANGINA PECTORIS: ICD-10-CM

## 2024-06-07 PROCEDURE — 83036 HEMOGLOBIN GLYCOSYLATED A1C: CPT

## 2024-06-07 PROCEDURE — G0463: CPT

## 2024-06-07 PROCEDURE — 99213 OFFICE O/P EST LOW 20 MIN: CPT | Mod: GE

## 2024-06-11 ENCOUNTER — LABORATORY RESULT (OUTPATIENT)
Age: 69
End: 2024-06-11

## 2024-06-11 ENCOUNTER — APPOINTMENT (OUTPATIENT)
Age: 69
End: 2024-06-11

## 2024-06-11 ENCOUNTER — APPOINTMENT (OUTPATIENT)
Dept: CARDIOLOGY | Facility: CLINIC | Age: 69
End: 2024-06-11
Payer: MEDICAID

## 2024-06-11 VITALS
SYSTOLIC BLOOD PRESSURE: 124 MMHG | HEIGHT: 57 IN | OXYGEN SATURATION: 98 % | DIASTOLIC BLOOD PRESSURE: 60 MMHG | WEIGHT: 123 LBS | BODY MASS INDEX: 26.54 KG/M2 | HEART RATE: 65 BPM | TEMPERATURE: 97.5 F

## 2024-06-11 DIAGNOSIS — R94.31 ABNORMAL ELECTROCARDIOGRAM [ECG] [EKG]: ICD-10-CM

## 2024-06-11 DIAGNOSIS — Z01.818 ENCOUNTER FOR OTHER PREPROCEDURAL EXAMINATION: ICD-10-CM

## 2024-06-11 DIAGNOSIS — R06.09 OTHER FORMS OF DYSPNEA: ICD-10-CM

## 2024-06-11 DIAGNOSIS — E78.5 HYPERLIPIDEMIA, UNSPECIFIED: ICD-10-CM

## 2024-06-11 DIAGNOSIS — I10 ESSENTIAL (PRIMARY) HYPERTENSION: ICD-10-CM

## 2024-06-11 PROBLEM — I25.10 CAD (CORONARY ARTERY DISEASE): Status: ACTIVE | Noted: 2020-01-03

## 2024-06-11 PROBLEM — E11.9 DIABETES MELLITUS: Status: ACTIVE | Noted: 2020-01-03

## 2024-06-11 LAB
ESTIMATED AVERAGE GLUCOSE: 192 MG/DL
HBA1C MFR BLD HPLC: 8.3 %

## 2024-06-11 PROCEDURE — 93000 ELECTROCARDIOGRAM COMPLETE: CPT

## 2024-06-11 PROCEDURE — 99204 OFFICE O/P NEW MOD 45 MIN: CPT | Mod: 25

## 2024-06-11 RX ORDER — ASPIRIN ENTERIC COATED TABLETS 81 MG 81 MG/1
81 TABLET, DELAYED RELEASE ORAL DAILY
Qty: 30 | Refills: 3 | Status: ACTIVE | COMMUNITY
Start: 2024-06-11

## 2024-06-11 NOTE — HISTORY OF PRESENT ILLNESS
[FreeTextEntry1] : follow-up  [de-identified] : 68F with h/o HTN, HLD, and DM who presents for follow-up of DM and colonoscopy screening. - Pt states she is eating and drinking well, trying to follow a low-carb diet.  - Reports seeing her endocrinologist Dr. Liam Daniels 3 weeks ago, however no notes showing up in the system ([phone]) - Pt has no new complaints, pending colonoscopy screening and cardiac clearance

## 2024-06-11 NOTE — HISTORY OF PRESENT ILLNESS
[FreeTextEntry1] : this is the first office visit for this 67 y/o female with dm x 20 = years /htn x3 years and hyperlipidemia pt under care of pmd and endo .pt here for cv clearance prior to colonoscopy dr avendano .pt endorses ocassional sob with exertion .pt denies cp pt denies any chest  pain dizziness ,lightheadedness ,nausea vomiting diaphoresis pt with baseline abnormal ekg atleast 3 years old unchanged s/p prior stress echo ok

## 2024-06-11 NOTE — ASSESSMENT
[FreeTextEntry1] : 68F with h/o HLD, HTN, and DM who presents for follow-up of DM and colonoscopy screening, pending cardio clearance.   #DM follow-up  - pt states she sees Dr. Walter Jacobsen, 3 weeks ago - continues to take admelog 12u premeal and lantus 18 at bedtime as well as metformin 1g BID  - continue to eat a low-carb diet  - recommended to follow-up with Dr. Jacobsen, A1C increased to 8.3 from 7.3 - recommend closer follow-up with endocrinology, and consider additional DM medication i/so cardiac disease and rising A1C   #HTN  - vitals 118/80s, wnl - c/w losartan and amlodipine #HLD - f/u lipid panel and cardio clearance   #HCM - immunizations UTD - screening: pending colonoscopy, with cardio clearance  - labs UTD   RTC for annual exam

## 2024-06-12 ENCOUNTER — NON-APPOINTMENT (OUTPATIENT)
Age: 69
End: 2024-06-12

## 2024-06-12 LAB
ALBUMIN SERPL ELPH-MCNC: 4.3 G/DL
ALP BLD-CCNC: 97 U/L
ALT SERPL-CCNC: 17 U/L
ANION GAP SERPL CALC-SCNC: 11 MMOL/L
APPEARANCE: CLEAR
AST SERPL-CCNC: 16 U/L
BACTERIA: NEGATIVE /HPF
BASOPHILS # BLD AUTO: 0.03 K/UL
BASOPHILS NFR BLD AUTO: 0.6 %
BILIRUB SERPL-MCNC: 0.4 MG/DL
BILIRUBIN URINE: NEGATIVE
BLOOD URINE: NEGATIVE
BUN SERPL-MCNC: 13 MG/DL
CALCIUM SERPL-MCNC: 10.4 MG/DL
CAST: 0 /LPF
CHLORIDE SERPL-SCNC: 103 MMOL/L
CHOLEST SERPL-MCNC: 141 MG/DL
CK SERPL-CCNC: 77 U/L
CO2 SERPL-SCNC: 24 MMOL/L
COLOR: YELLOW
CREAT SERPL-MCNC: 0.61 MG/DL
EGFR: 99 ML/MIN/1.73M2
EOSINOPHIL # BLD AUTO: 0.12 K/UL
EOSINOPHIL NFR BLD AUTO: 2.4 %
EPITHELIAL CELLS: 3 /HPF
GLUCOSE QUALITATIVE U: NEGATIVE MG/DL
GLUCOSE SERPL-MCNC: 211 MG/DL
HCT VFR BLD CALC: 40.4 %
HDLC SERPL-MCNC: 51 MG/DL
HGB BLD-MCNC: 12.5 G/DL
IMM GRANULOCYTES NFR BLD AUTO: 0.2 %
KETONES URINE: NEGATIVE MG/DL
LDLC SERPL CALC-MCNC: 76 MG/DL
LDLC SERPL DIRECT ASSAY-MCNC: 77 MG/DL
LEUKOCYTE ESTERASE URINE: ABNORMAL
LYMPHOCYTES # BLD AUTO: 1.61 K/UL
LYMPHOCYTES NFR BLD AUTO: 32.6 %
MAN DIFF?: NORMAL
MCHC RBC-ENTMCNC: 27.1 PG
MCHC RBC-ENTMCNC: 30.9 GM/DL
MCV RBC AUTO: 87.4 FL
MICROSCOPIC-UA: NORMAL
MONOCYTES # BLD AUTO: 0.37 K/UL
MONOCYTES NFR BLD AUTO: 7.5 %
NEUTROPHILS # BLD AUTO: 2.8 K/UL
NEUTROPHILS NFR BLD AUTO: 56.7 %
NITRITE URINE: NEGATIVE
NONHDLC SERPL-MCNC: 89 MG/DL
PH URINE: 6
PLATELET # BLD AUTO: 325 K/UL
POTASSIUM SERPL-SCNC: 4.7 MMOL/L
PROT SERPL-MCNC: 6.8 G/DL
PROTEIN URINE: NEGATIVE MG/DL
RBC # BLD: 4.62 M/UL
RBC # FLD: 13.1 %
RED BLOOD CELLS URINE: 1 /HPF
SODIUM SERPL-SCNC: 138 MMOL/L
SPECIFIC GRAVITY URINE: 1.01
T4 FREE SERPL-MCNC: 1.3 NG/DL
TRIGL SERPL-MCNC: 65 MG/DL
TSH SERPL-ACNC: 2.47 UIU/ML
UROBILINOGEN URINE: 0.2 MG/DL
WBC # FLD AUTO: 4.94 K/UL
WHITE BLOOD CELLS URINE: 6 /HPF

## 2024-06-13 DIAGNOSIS — R82.90 UNSPECIFIED ABNORMAL FINDINGS IN URINE: ICD-10-CM

## 2024-06-17 ENCOUNTER — APPOINTMENT (OUTPATIENT)
Dept: INTERNAL MEDICINE | Facility: CLINIC | Age: 69
End: 2024-06-17

## 2024-06-18 DIAGNOSIS — E11.9 TYPE 2 DIABETES MELLITUS WITHOUT COMPLICATIONS: ICD-10-CM

## 2024-06-18 DIAGNOSIS — I25.10 ATHEROSCLEROTIC HEART DISEASE OF NATIVE CORONARY ARTERY WITHOUT ANGINA PECTORIS: ICD-10-CM

## 2024-06-24 ENCOUNTER — APPOINTMENT (OUTPATIENT)
Dept: CT IMAGING | Facility: CLINIC | Age: 69
End: 2024-06-24

## 2024-07-03 ENCOUNTER — APPOINTMENT (OUTPATIENT)
Dept: CARDIOLOGY | Facility: CLINIC | Age: 69
End: 2024-07-03
Payer: MEDICAID

## 2024-07-03 PROCEDURE — 93306 TTE W/DOPPLER COMPLETE: CPT

## 2024-08-12 ENCOUNTER — APPOINTMENT (OUTPATIENT)
Dept: CT IMAGING | Facility: CLINIC | Age: 69
End: 2024-08-12
Payer: MEDICAID

## 2024-08-12 PROCEDURE — 75574 CT ANGIO HRT W/3D IMAGE: CPT

## 2024-08-13 ENCOUNTER — APPOINTMENT (OUTPATIENT)
Dept: RADIOLOGY | Facility: IMAGING CENTER | Age: 69
End: 2024-08-13

## 2024-08-13 ENCOUNTER — NON-APPOINTMENT (OUTPATIENT)
Age: 69
End: 2024-08-13

## 2024-08-13 ENCOUNTER — APPOINTMENT (OUTPATIENT)
Dept: CARDIOLOGY | Facility: CLINIC | Age: 69
End: 2024-08-13
Payer: MEDICAID

## 2024-08-13 VITALS
HEART RATE: 64 BPM | HEIGHT: 57 IN | SYSTOLIC BLOOD PRESSURE: 134 MMHG | OXYGEN SATURATION: 99 % | DIASTOLIC BLOOD PRESSURE: 60 MMHG | WEIGHT: 123 LBS | TEMPERATURE: 98.1 F | BODY MASS INDEX: 26.54 KG/M2

## 2024-08-13 DIAGNOSIS — R06.09 OTHER FORMS OF DYSPNEA: ICD-10-CM

## 2024-08-13 DIAGNOSIS — I10 ESSENTIAL (PRIMARY) HYPERTENSION: ICD-10-CM

## 2024-08-13 DIAGNOSIS — R94.31 ABNORMAL ELECTROCARDIOGRAM [ECG] [EKG]: ICD-10-CM

## 2024-08-13 DIAGNOSIS — E11.9 TYPE 2 DIABETES MELLITUS W/OUT COMPLICATIONS: ICD-10-CM

## 2024-08-13 DIAGNOSIS — I25.10 ATHEROSCLEROTIC HEART DISEASE OF NATIVE CORONARY ARTERY W/OUT ANGINA PECTORIS: ICD-10-CM

## 2024-08-13 DIAGNOSIS — M25.511 PAIN IN RIGHT SHOULDER: ICD-10-CM

## 2024-08-13 DIAGNOSIS — E78.5 HYPERLIPIDEMIA, UNSPECIFIED: ICD-10-CM

## 2024-08-13 LAB
ALBUMIN SERPL ELPH-MCNC: 4.3 G/DL
ALP BLD-CCNC: 102 U/L
ALT SERPL-CCNC: 25 U/L
ANION GAP SERPL CALC-SCNC: 12 MMOL/L
AST SERPL-CCNC: 20 U/L
BASOPHILS # BLD AUTO: 0.04 K/UL
BASOPHILS NFR BLD AUTO: 0.6 %
BILIRUB SERPL-MCNC: 0.4 MG/DL
BUN SERPL-MCNC: 9 MG/DL
CALCIUM SERPL-MCNC: 10.5 MG/DL
CHLORIDE SERPL-SCNC: 104 MMOL/L
CO2 SERPL-SCNC: 25 MMOL/L
CREAT SERPL-MCNC: 0.57 MG/DL
EGFR: 100 ML/MIN/1.73M2
EOSINOPHIL # BLD AUTO: 0.04 K/UL
EOSINOPHIL NFR BLD AUTO: 0.6 %
GLUCOSE SERPL-MCNC: 65 MG/DL
HCT VFR BLD CALC: 41.8 %
HGB BLD-MCNC: 12.7 G/DL
IMM GRANULOCYTES NFR BLD AUTO: 0.3 %
LYMPHOCYTES # BLD AUTO: 1.68 K/UL
LYMPHOCYTES NFR BLD AUTO: 25.3 %
MAN DIFF?: NORMAL
MCHC RBC-ENTMCNC: 26.4 PG
MCHC RBC-ENTMCNC: 30.4 GM/DL
MCV RBC AUTO: 86.9 FL
MONOCYTES # BLD AUTO: 0.49 K/UL
MONOCYTES NFR BLD AUTO: 7.4 %
NEUTROPHILS # BLD AUTO: 4.37 K/UL
NEUTROPHILS NFR BLD AUTO: 65.8 %
PLATELET # BLD AUTO: 303 K/UL
POTASSIUM SERPL-SCNC: 4.3 MMOL/L
PROT SERPL-MCNC: 6.8 G/DL
RBC # BLD: 4.81 M/UL
RBC # FLD: 12.8 %
SODIUM SERPL-SCNC: 141 MMOL/L
WBC # FLD AUTO: 6.64 K/UL

## 2024-08-13 PROCEDURE — 71046 X-RAY EXAM CHEST 2 VIEWS: CPT | Mod: 26

## 2024-08-13 PROCEDURE — 99214 OFFICE O/P EST MOD 30 MIN: CPT | Mod: 25

## 2024-08-13 PROCEDURE — 93000 ELECTROCARDIOGRAM COMPLETE: CPT

## 2024-08-13 PROCEDURE — G2211 COMPLEX E/M VISIT ADD ON: CPT | Mod: NC

## 2024-08-13 RX ORDER — METHOCARBAMOL 500 MG/1
500 TABLET, FILM COATED ORAL
Qty: 30 | Refills: 0 | Status: ACTIVE | COMMUNITY
Start: 2024-08-13 | End: 1900-01-01

## 2024-08-13 NOTE — REVIEW OF SYSTEMS
[Dyspnea on exertion] : dyspnea during exertion [Negative] : Heme/Lymph [FreeTextEntry2] : see hpi [de-identified] : neck pain

## 2024-08-13 NOTE — HISTORY OF PRESENT ILLNESS
[FreeTextEntry1] : This is a 68 year y/o female with a PMHx of CAD, DM, HTN, and HLD presents today for follow up. Pt was last here 1 month ago with complaints of TAVAREZ, she is s/p CTCA with , noncalcified plaque within distal segment causing severe stenosis and calcified plaque in the LCx within the distal segment poorly evaluated 2/2 to small caliber of the vessel, and moderate stenosis of the RCA. Pt reports occasional SOB with exertion. She is now c/o right shoulder pain x1 week.  Patient denies chest pain, palpitations, dizziness, syncope, changes in bowel/bladder habits or appetite

## 2024-08-15 ENCOUNTER — TRANSCRIPTION ENCOUNTER (OUTPATIENT)
Age: 69
End: 2024-08-15

## 2024-08-20 ENCOUNTER — RESULT REVIEW (OUTPATIENT)
Age: 69
End: 2024-08-20

## 2024-08-20 ENCOUNTER — APPOINTMENT (OUTPATIENT)
Dept: MRI IMAGING | Facility: CLINIC | Age: 69
End: 2024-08-20
Payer: MEDICAID

## 2024-08-20 ENCOUNTER — OUTPATIENT (OUTPATIENT)
Dept: OUTPATIENT SERVICES | Facility: HOSPITAL | Age: 69
LOS: 1 days | End: 2024-08-20
Payer: MEDICAID

## 2024-08-20 DIAGNOSIS — Z90.710 ACQUIRED ABSENCE OF BOTH CERVIX AND UTERUS: Chronic | ICD-10-CM

## 2024-08-20 DIAGNOSIS — Z90.49 ACQUIRED ABSENCE OF OTHER SPECIFIED PARTS OF DIGESTIVE TRACT: Chronic | ICD-10-CM

## 2024-08-20 DIAGNOSIS — R94.31 ABNORMAL ELECTROCARDIOGRAM [ECG] [EKG]: ICD-10-CM

## 2024-08-20 PROCEDURE — 75561 CARDIAC MRI FOR MORPH W/DYE: CPT | Mod: 26

## 2024-08-20 PROCEDURE — 75565 CARD MRI VELOC FLOW MAPPING: CPT | Mod: 26

## 2024-08-20 PROCEDURE — 75565 CARD MRI VELOC FLOW MAPPING: CPT

## 2024-08-20 PROCEDURE — 75561 CARDIAC MRI FOR MORPH W/DYE: CPT

## 2024-08-20 PROCEDURE — A9585: CPT

## 2024-08-22 ENCOUNTER — APPOINTMENT (OUTPATIENT)
Age: 69
End: 2024-08-22
Payer: MEDICAID

## 2024-08-22 PROCEDURE — D2391: CPT

## 2024-08-26 DIAGNOSIS — I51.7 CARDIOMEGALY: ICD-10-CM

## 2024-08-26 RX ORDER — METOPROLOL SUCCINATE 25 MG/1
25 TABLET, EXTENDED RELEASE ORAL
Qty: 90 | Refills: 1 | Status: ACTIVE | COMMUNITY
Start: 2024-08-26 | End: 1900-01-01

## 2024-08-29 ENCOUNTER — APPOINTMENT (OUTPATIENT)
Dept: ORTHOPEDIC SURGERY | Facility: CLINIC | Age: 69
End: 2024-08-29
Payer: MEDICAID

## 2024-08-29 VITALS
DIASTOLIC BLOOD PRESSURE: 80 MMHG | HEART RATE: 72 BPM | BODY MASS INDEX: 24.98 KG/M2 | HEIGHT: 58 IN | SYSTOLIC BLOOD PRESSURE: 140 MMHG | WEIGHT: 119 LBS

## 2024-08-29 DIAGNOSIS — M25.511 PAIN IN RIGHT SHOULDER: ICD-10-CM

## 2024-08-29 PROCEDURE — 99203 OFFICE O/P NEW LOW 30 MIN: CPT | Mod: 25

## 2024-08-29 PROCEDURE — 73030 X-RAY EXAM OF SHOULDER: CPT | Mod: RT

## 2024-08-29 NOTE — PHYSICAL EXAM
[de-identified] :  Right shoulder exam  Inspection: No malalignment, No defects, No atrophy Skin: No masses, No lesions Neck: Negative Spurling's, full ROM, no pain with ROM AROM: FF to 180, abduction to 90, ER to 90, IR to upper lumbar Painful arc ROM: none Tenderness: no bicipital tenderness, no tenderness to the greater tuberosity/RTC insertion, no anterior shoulder/lesser tuberosity tenderness Strength: 5/5 ER, 5/5 IR in adduction, 5/5 supraspinatus testing with pain, negative [place]'s test AC Joint: No ttp/pain with cross arm testing Biceps: Speed Negative, Yergusons Negative Impingement test: + Mccurdy, +Neer  Stability: Stable Vasc: 2+ radial pulse Neuro: AIN, PIN, Ulnar nerve intact to motor Sensation: Intact to light touch throughout  [de-identified] : The following radiographs were ordered and read by me during this patients visit. I reviewed each radiograph in detail with the patient and discussed the findings as highlighted below.   3 views of the right shoulder were obtained, 08/29/2024, that show no acute fracture or dislocation. There is no glenohumeral and moderate AC joint degenerative change seen. Type II acromion. There is no significant malalignment. No significant other obvious osseous abnormality, otherwise unremarkable.

## 2024-08-29 NOTE — PHYSICAL EXAM
[de-identified] :  Right shoulder exam  Inspection: No malalignment, No defects, No atrophy Skin: No masses, No lesions Neck: Negative Spurling's, full ROM, no pain with ROM AROM: FF to 180, abduction to 90, ER to 90, IR to upper lumbar Painful arc ROM: none Tenderness: no bicipital tenderness, no tenderness to the greater tuberosity/RTC insertion, no anterior shoulder/lesser tuberosity tenderness Strength: 5/5 ER, 5/5 IR in adduction, 5/5 supraspinatus testing with pain, negative [place]'s test AC Joint: No ttp/pain with cross arm testing Biceps: Speed Negative, Yergusons Negative Impingement test: + Mccurdy, +Neer  Stability: Stable Vasc: 2+ radial pulse Neuro: AIN, PIN, Ulnar nerve intact to motor Sensation: Intact to light touch throughout  [de-identified] : The following radiographs were ordered and read by me during this patients visit. I reviewed each radiograph in detail with the patient and discussed the findings as highlighted below.   3 views of the right shoulder were obtained, 08/29/2024, that show no acute fracture or dislocation. There is no glenohumeral and moderate AC joint degenerative change seen. Type II acromion. There is no significant malalignment. No significant other obvious osseous abnormality, otherwise unremarkable.

## 2024-08-29 NOTE — HISTORY OF PRESENT ILLNESS
[de-identified] : 68-year-old RHD female presents for evaluation of right shoulder pain x 1 month. Denies trauma or injury. She complains of constant sharp pain in the shoulder worse with any movements of the shoulder. She has not [place] with mild relief. Denies numbness/tingling in the RUE. Denies prior injuries.  The patient's past medical history, past surgical history, medications and allergies were reviewed by me today with the patient and documented accordingly. In addition, the patient's family and social history, which were noncontributory to this visit were reviewed also.

## 2024-08-29 NOTE — HISTORY OF PRESENT ILLNESS
[de-identified] : 68-year-old RHD female presents for evaluation of right shoulder pain x 1 month. Denies trauma or injury. She complains of constant sharp pain in the shoulder worse with any movements of the shoulder. She has not [place] with mild relief. Denies numbness/tingling in the RUE. Denies prior injuries.  The patient's past medical history, past surgical history, medications and allergies were reviewed by me today with the patient and documented accordingly. In addition, the patient's family and social history, which were noncontributory to this visit were reviewed also.

## 2024-08-29 NOTE — DISCUSSION/SUMMARY
[de-identified] : 69 y/o female with right shoulder pain.   A discussion was had with the patient regarding potential sources of inflammatory shoulder discomfort; including rotator cuff tendon dysfunction (including tendonitis vs. internal structural damage), subdeltoid/subacromial bursitis, or impingement of the rotator cuff at the acromion. Other contributing sources of pain may be the acromioclavicular joint or long head of the biceps tendon. Irritation is typically caused either by overhead repetitive activity or as a result of minor injury.   Discussed short-term and long-term outcomes as well as the goal of treatment to reduce pain and restore function. Nonsurgical treatment is typically first-line therapy that may take weeks to months to resolve symptoms; includes rest from overhead activities, NSAIDs, home exercise program versus physical therapy to restore normal strength/ROM/function of the shoulder, and possible corticosteroid injection. Also discussed the role of arthroscopic surgical intervention when nonsurgical treatment does not adequately relieve pain/inflammation.   Recommendations: Conservative modalities as above (overhead activity rest/activity avoidance until less symptomatic, ice, NSAIDs if tolerated, home exercise strengthening and stretching program).   Physical therapy Rx given for shoulder/RTC strengthening and conditioning program   Follow-up: 6-8wks as needed if symptoms persist or worsen.

## 2024-08-29 NOTE — ADDENDUM
[FreeTextEntry1] : This note was written by Carmel Rowe on 08/29/2024 acting solely as a scribe for Dr. Uri Tovar.  All medical record entries made by the Scribe were at my, Dr. Uri Tovar, direction and personally dictated by me on 08/29/2024. I have personally reviewed the chart and agree that the record accurately reflects my personal performance of the history, physical exam, assessment and plan.

## 2024-08-29 NOTE — DISCUSSION/SUMMARY
[de-identified] : 67 y/o female with right shoulder pain.   A discussion was had with the patient regarding potential sources of inflammatory shoulder discomfort; including rotator cuff tendon dysfunction (including tendonitis vs. internal structural damage), subdeltoid/subacromial bursitis, or impingement of the rotator cuff at the acromion. Other contributing sources of pain may be the acromioclavicular joint or long head of the biceps tendon. Irritation is typically caused either by overhead repetitive activity or as a result of minor injury.   Discussed short-term and long-term outcomes as well as the goal of treatment to reduce pain and restore function. Nonsurgical treatment is typically first-line therapy that may take weeks to months to resolve symptoms; includes rest from overhead activities, NSAIDs, home exercise program versus physical therapy to restore normal strength/ROM/function of the shoulder, and possible corticosteroid injection. Also discussed the role of arthroscopic surgical intervention when nonsurgical treatment does not adequately relieve pain/inflammation.   Recommendations: Conservative modalities as above (overhead activity rest/activity avoidance until less symptomatic, ice, NSAIDs if tolerated, home exercise strengthening and stretching program).   Physical therapy Rx given for shoulder/RTC strengthening and conditioning program   Follow-up: 6-8wks as needed if symptoms persist or worsen.

## 2024-08-30 ENCOUNTER — RX RENEWAL (OUTPATIENT)
Age: 69
End: 2024-08-30

## 2024-08-31 ENCOUNTER — RX RENEWAL (OUTPATIENT)
Age: 69
End: 2024-08-31

## 2024-09-17 ENCOUNTER — APPOINTMENT (OUTPATIENT)
Dept: CARDIOLOGY | Facility: CLINIC | Age: 69
End: 2024-09-17

## 2024-11-01 ENCOUNTER — APPOINTMENT (OUTPATIENT)
Dept: CARDIOLOGY | Facility: CLINIC | Age: 69
End: 2024-11-01

## 2024-12-11 ENCOUNTER — RX RENEWAL (OUTPATIENT)
Age: 69
End: 2024-12-11

## 2025-02-27 ENCOUNTER — OUTPATIENT (OUTPATIENT)
Dept: OUTPATIENT SERVICES | Facility: HOSPITAL | Age: 70
LOS: 1 days | End: 2025-02-27
Payer: MEDICAID

## 2025-02-27 ENCOUNTER — APPOINTMENT (OUTPATIENT)
Dept: INTERNAL MEDICINE | Facility: CLINIC | Age: 70
End: 2025-02-27
Payer: MEDICAID

## 2025-02-27 VITALS
DIASTOLIC BLOOD PRESSURE: 76 MMHG | BODY MASS INDEX: 25.64 KG/M2 | HEIGHT: 58 IN | WEIGHT: 122.13 LBS | SYSTOLIC BLOOD PRESSURE: 150 MMHG | OXYGEN SATURATION: 100 % | HEART RATE: 70 BPM

## 2025-02-27 DIAGNOSIS — I10 ESSENTIAL (PRIMARY) HYPERTENSION: ICD-10-CM

## 2025-02-27 DIAGNOSIS — L03.119 CELLULITIS OF UNSPECIFIED PART OF LIMB: ICD-10-CM

## 2025-02-27 DIAGNOSIS — Z00.00 ENCOUNTER FOR GENERAL ADULT MEDICAL EXAMINATION W/OUT ABNORMAL FINDINGS: ICD-10-CM

## 2025-02-27 DIAGNOSIS — M25.511 PAIN IN RIGHT SHOULDER: ICD-10-CM

## 2025-02-27 DIAGNOSIS — Z23 ENCOUNTER FOR IMMUNIZATION: ICD-10-CM

## 2025-02-27 DIAGNOSIS — N95.2 POSTMENOPAUSAL ATROPHIC VAGINITIS: ICD-10-CM

## 2025-02-27 DIAGNOSIS — R82.90 UNSPECIFIED ABNORMAL FINDINGS IN URINE: ICD-10-CM

## 2025-02-27 DIAGNOSIS — N63.0 UNSPECIFIED LUMP IN UNSPECIFIED BREAST: ICD-10-CM

## 2025-02-27 DIAGNOSIS — Z12.31 ENCOUNTER FOR SCREENING MAMMOGRAM FOR MALIGNANT NEOPLASM OF BREAST: ICD-10-CM

## 2025-02-27 DIAGNOSIS — H26.9 UNSPECIFIED CATARACT: ICD-10-CM

## 2025-02-27 DIAGNOSIS — M79.673 PAIN IN UNSPECIFIED FOOT: ICD-10-CM

## 2025-02-27 DIAGNOSIS — Z01.818 ENCOUNTER FOR OTHER PREPROCEDURAL EXAMINATION: ICD-10-CM

## 2025-02-27 DIAGNOSIS — Z01.419 ENCOUNTER FOR GYNECOLOGICAL EXAMINATION (GENERAL) (ROUTINE) W/OUT ABNORMAL FINDINGS: ICD-10-CM

## 2025-02-27 DIAGNOSIS — G31.84 MILD COGNITIVE IMPAIRMENT, SO STATED: ICD-10-CM

## 2025-02-27 DIAGNOSIS — Z87.19 PERSONAL HISTORY OF OTHER DISEASES OF THE DIGESTIVE SYSTEM: ICD-10-CM

## 2025-02-27 DIAGNOSIS — M85.80 OTHER SPECIFIED DISORDERS OF BONE DENSITY AND STRUCTURE, UNSPECIFIED SITE: ICD-10-CM

## 2025-02-27 DIAGNOSIS — I25.10 ATHEROSCLEROTIC HEART DISEASE OF NATIVE CORONARY ARTERY W/OUT ANGINA PECTORIS: ICD-10-CM

## 2025-02-27 DIAGNOSIS — Z12.72 ENCOUNTER FOR SCREENING FOR MALIGNANT NEOPLASM OF VAGINA: ICD-10-CM

## 2025-02-27 DIAGNOSIS — Z86.39 PERSONAL HISTORY OF OTHER ENDOCRINE, NUTRITIONAL AND METABOLIC DISEASE: ICD-10-CM

## 2025-02-27 PROCEDURE — 99397 PER PM REEVAL EST PAT 65+ YR: CPT

## 2025-03-03 PROCEDURE — 80061 LIPID PANEL: CPT

## 2025-03-03 PROCEDURE — 80053 COMPREHEN METABOLIC PANEL: CPT

## 2025-03-03 PROCEDURE — 83036 HEMOGLOBIN GLYCOSYLATED A1C: CPT

## 2025-03-03 PROCEDURE — 82306 VITAMIN D 25 HYDROXY: CPT

## 2025-03-03 PROCEDURE — 85027 COMPLETE CBC AUTOMATED: CPT

## 2025-03-03 PROCEDURE — 82043 UR ALBUMIN QUANTITATIVE: CPT

## 2025-03-07 ENCOUNTER — OUTPATIENT (OUTPATIENT)
Dept: OUTPATIENT SERVICES | Facility: HOSPITAL | Age: 70
LOS: 1 days | End: 2025-03-07

## 2025-03-07 ENCOUNTER — APPOINTMENT (OUTPATIENT)
Dept: INTERNAL MEDICINE | Facility: CLINIC | Age: 70
End: 2025-03-07
Payer: MEDICAID

## 2025-03-07 DIAGNOSIS — Z90.710 ACQUIRED ABSENCE OF BOTH CERVIX AND UTERUS: Chronic | ICD-10-CM

## 2025-03-07 DIAGNOSIS — Z90.49 ACQUIRED ABSENCE OF OTHER SPECIFIED PARTS OF DIGESTIVE TRACT: Chronic | ICD-10-CM

## 2025-03-07 DIAGNOSIS — E11.9 TYPE 2 DIABETES MELLITUS W/OUT COMPLICATIONS: ICD-10-CM

## 2025-03-07 LAB
25(OH)D3 SERPL-MCNC: 11.9 NG/ML
ALBUMIN SERPL ELPH-MCNC: 4.3 G/DL
ALP BLD-CCNC: 146 U/L
ALT SERPL-CCNC: 18 U/L
ANION GAP SERPL CALC-SCNC: 12 MMOL/L
AST SERPL-CCNC: 15 U/L
BILIRUB SERPL-MCNC: 0.4 MG/DL
BUN SERPL-MCNC: 14 MG/DL
CALCIUM SERPL-MCNC: 10.4 MG/DL
CHLORIDE SERPL-SCNC: 100 MMOL/L
CHOLEST SERPL-MCNC: 150 MG/DL
CO2 SERPL-SCNC: 24 MMOL/L
CREAT SERPL-MCNC: 0.6 MG/DL
CREAT SPEC-SCNC: 42 MG/DL
EGFR: 98 ML/MIN/1.73M2
ESTIMATED AVERAGE GLUCOSE: 283 MG/DL
GLUCOSE SERPL-MCNC: 310 MG/DL
HBA1C MFR BLD HPLC: 11.5 %
HCT VFR BLD CALC: 43.8 %
HDLC SERPL-MCNC: 49 MG/DL
HGB BLD-MCNC: 13.7 G/DL
LDLC SERPL CALC-MCNC: 90 MG/DL
MCHC RBC-ENTMCNC: 26.8 PG
MCHC RBC-ENTMCNC: 31.3 G/DL
MCV RBC AUTO: 85.7 FL
MICROALBUMIN 24H UR DL<=1MG/L-MCNC: <1.2 MG/DL
MICROALBUMIN/CREAT 24H UR-RTO: NORMAL MG/G
NONHDLC SERPL-MCNC: 101 MG/DL
PLATELET # BLD AUTO: 296 K/UL
POTASSIUM SERPL-SCNC: 4.4 MMOL/L
PROT SERPL-MCNC: 6.9 G/DL
RBC # BLD: 5.11 M/UL
RBC # FLD: 12.4 %
SODIUM SERPL-SCNC: 136 MMOL/L
TRIGL SERPL-MCNC: 50 MG/DL
WBC # FLD AUTO: 8.61 K/UL

## 2025-03-07 PROCEDURE — 99213 OFFICE O/P EST LOW 20 MIN: CPT | Mod: 93

## 2025-03-07 RX ORDER — LANCETS 33 GAUGE
EACH MISCELLANEOUS
Qty: 1 | Refills: 3 | Status: ACTIVE | COMMUNITY
Start: 2025-03-07 | End: 1900-01-01

## 2025-03-07 RX ORDER — BLOOD-GLUCOSE METER
KIT MISCELLANEOUS
Qty: 4 | Refills: 0 | Status: ACTIVE | COMMUNITY
Start: 2025-03-07 | End: 1900-01-01

## 2025-03-07 RX ORDER — BLOOD-GLUCOSE METER
W/DEVICE KIT MISCELLANEOUS
Qty: 1 | Refills: 0 | Status: ACTIVE | COMMUNITY
Start: 2025-03-07 | End: 1900-01-01

## 2025-03-07 RX ORDER — ISOPROPYL ALCOHOL 70 ML/100ML
SWAB TOPICAL
Qty: 400 | Refills: 3 | Status: ACTIVE | COMMUNITY
Start: 2025-03-07 | End: 1900-01-01

## 2025-03-10 DIAGNOSIS — I10 ESSENTIAL (PRIMARY) HYPERTENSION: ICD-10-CM

## 2025-03-14 ENCOUNTER — APPOINTMENT (OUTPATIENT)
Age: 70
End: 2025-03-14

## 2025-03-14 ENCOUNTER — APPOINTMENT (OUTPATIENT)
Age: 70
End: 2025-03-14
Payer: MEDICAID

## 2025-03-14 PROCEDURE — D0120: CPT

## 2025-03-14 PROCEDURE — D1110 PROPHYLAXIS - ADULT: CPT

## 2025-03-14 PROCEDURE — D0274: CPT

## 2025-03-17 DIAGNOSIS — E11.9 TYPE 2 DIABETES MELLITUS WITHOUT COMPLICATIONS: ICD-10-CM

## 2025-03-20 ENCOUNTER — APPOINTMENT (OUTPATIENT)
Dept: INTERNAL MEDICINE | Facility: CLINIC | Age: 70
End: 2025-03-20

## 2025-04-14 ENCOUNTER — APPOINTMENT (OUTPATIENT)
Age: 70
End: 2025-04-14
Payer: MEDICAID

## 2025-04-14 PROCEDURE — NTX: CUSTOM

## 2025-04-17 ENCOUNTER — OUTPATIENT (OUTPATIENT)
Dept: OUTPATIENT SERVICES | Facility: HOSPITAL | Age: 70
LOS: 1 days | End: 2025-04-17
Payer: MEDICAID

## 2025-04-17 ENCOUNTER — APPOINTMENT (OUTPATIENT)
Dept: INTERNAL MEDICINE | Facility: CLINIC | Age: 70
End: 2025-04-17
Payer: MEDICAID

## 2025-04-17 VITALS
BODY MASS INDEX: 26.33 KG/M2 | OXYGEN SATURATION: 97 % | WEIGHT: 126 LBS | DIASTOLIC BLOOD PRESSURE: 60 MMHG | SYSTOLIC BLOOD PRESSURE: 124 MMHG | HEART RATE: 62 BPM

## 2025-04-17 DIAGNOSIS — Z00.00 ENCOUNTER FOR GENERAL ADULT MEDICAL EXAMINATION W/OUT ABNORMAL FINDINGS: ICD-10-CM

## 2025-04-17 DIAGNOSIS — I10 ESSENTIAL (PRIMARY) HYPERTENSION: ICD-10-CM

## 2025-04-17 DIAGNOSIS — Z90.710 ACQUIRED ABSENCE OF BOTH CERVIX AND UTERUS: Chronic | ICD-10-CM

## 2025-04-17 DIAGNOSIS — E11.9 TYPE 2 DIABETES MELLITUS W/OUT COMPLICATIONS: ICD-10-CM

## 2025-04-17 PROCEDURE — G0463: CPT

## 2025-04-17 PROCEDURE — G2211 COMPLEX E/M VISIT ADD ON: CPT | Mod: NC

## 2025-04-17 PROCEDURE — 99214 OFFICE O/P EST MOD 30 MIN: CPT

## 2025-04-17 RX ORDER — BLOOD-GLUCOSE METER
KIT MISCELLANEOUS
Qty: 1 | Refills: 0 | Status: ACTIVE | COMMUNITY
Start: 2025-04-17 | End: 1900-01-01

## 2025-04-22 DIAGNOSIS — E11.9 TYPE 2 DIABETES MELLITUS WITHOUT COMPLICATIONS: ICD-10-CM

## 2025-04-30 ENCOUNTER — APPOINTMENT (OUTPATIENT)
Dept: INTERNAL MEDICINE | Facility: CLINIC | Age: 70
End: 2025-04-30

## 2025-05-02 ENCOUNTER — APPOINTMENT (OUTPATIENT)
Age: 70
End: 2025-05-02

## 2025-05-21 ENCOUNTER — APPOINTMENT (OUTPATIENT)
Age: 70
End: 2025-05-21

## 2025-06-02 ENCOUNTER — APPOINTMENT (OUTPATIENT)
Age: 70
End: 2025-06-02

## 2025-06-07 NOTE — ED ADULT NURSE NOTE - NSICDXFAMILYHX_GEN_ALL_CORE_FT
FREE:[LAST:[PCP],PHONE:[(   )    -],FAX:[(   )    -],FOLLOWUP:[2 weeks]] FAMILY HISTORY:  FH: diabetes mellitus

## 2025-06-17 ENCOUNTER — APPOINTMENT (OUTPATIENT)
Dept: INTERNAL MEDICINE | Facility: CLINIC | Age: 70
End: 2025-06-17
Payer: MEDICAID

## 2025-06-17 ENCOUNTER — OUTPATIENT (OUTPATIENT)
Dept: OUTPATIENT SERVICES | Facility: HOSPITAL | Age: 70
LOS: 1 days | End: 2025-06-17
Payer: MEDICAID

## 2025-06-17 VITALS
HEART RATE: 70 BPM | HEIGHT: 58 IN | WEIGHT: 130 LBS | BODY MASS INDEX: 27.29 KG/M2 | DIASTOLIC BLOOD PRESSURE: 60 MMHG | OXYGEN SATURATION: 98 % | SYSTOLIC BLOOD PRESSURE: 140 MMHG

## 2025-06-17 VITALS — SYSTOLIC BLOOD PRESSURE: 128 MMHG | DIASTOLIC BLOOD PRESSURE: 60 MMHG

## 2025-06-17 DIAGNOSIS — I10 ESSENTIAL (PRIMARY) HYPERTENSION: ICD-10-CM

## 2025-06-17 DIAGNOSIS — Z90.710 ACQUIRED ABSENCE OF BOTH CERVIX AND UTERUS: Chronic | ICD-10-CM

## 2025-06-17 DIAGNOSIS — Z90.49 ACQUIRED ABSENCE OF OTHER SPECIFIED PARTS OF DIGESTIVE TRACT: Chronic | ICD-10-CM

## 2025-06-17 PROBLEM — Z55.0 ILLITERACY: Status: ACTIVE | Noted: 2025-06-17

## 2025-06-17 PROBLEM — M25.561 CHRONIC PAIN OF BOTH KNEES: Status: ACTIVE | Noted: 2025-06-17

## 2025-06-17 LAB — HBA1C MFR BLD HPLC: 11.1

## 2025-06-17 PROCEDURE — 99215 OFFICE O/P EST HI 40 MIN: CPT | Mod: 25

## 2025-06-17 PROCEDURE — G0463: CPT

## 2025-06-17 PROCEDURE — 83036 HEMOGLOBIN GLYCOSYLATED A1C: CPT

## 2025-06-17 RX ORDER — DICLOFENAC SODIUM 10 MG/G
1 GEL TOPICAL DAILY
Qty: 1 | Refills: 6 | Status: ACTIVE | COMMUNITY
Start: 2025-06-17 | End: 1900-01-01

## 2025-06-17 RX ORDER — BISACODYL 10 MG
SUPPOSITORY, RECTAL RECTAL
Qty: 1 | Refills: 0 | Status: ACTIVE | COMMUNITY
Start: 2025-06-17 | End: 1900-01-01

## 2025-06-20 ENCOUNTER — APPOINTMENT (OUTPATIENT)
Age: 70
End: 2025-06-20

## 2025-06-26 DIAGNOSIS — Z55.0 ILLITERACY AND LOW-LEVEL LITERACY: ICD-10-CM

## 2025-06-26 DIAGNOSIS — M25.562 PAIN IN LEFT KNEE: ICD-10-CM

## 2025-06-26 DIAGNOSIS — E11.9 TYPE 2 DIABETES MELLITUS WITHOUT COMPLICATIONS: ICD-10-CM

## 2025-06-26 DIAGNOSIS — M25.561 PAIN IN RIGHT KNEE: ICD-10-CM

## 2025-06-26 DIAGNOSIS — G89.29 OTHER CHRONIC PAIN: ICD-10-CM

## 2025-06-26 SDOH — EDUCATIONAL SECURITY - EDUCATION ATTAINMENT: ILITERACY AND LOW LEVEL LITERACY: Z55.0

## 2025-07-16 ENCOUNTER — OUTPATIENT (OUTPATIENT)
Dept: OUTPATIENT SERVICES | Facility: HOSPITAL | Age: 70
LOS: 1 days | End: 2025-07-16
Payer: MEDICAID

## 2025-07-16 ENCOUNTER — APPOINTMENT (OUTPATIENT)
Dept: INTERNAL MEDICINE | Facility: CLINIC | Age: 70
End: 2025-07-16
Payer: MEDICAID

## 2025-07-16 ENCOUNTER — TRANSCRIPTION ENCOUNTER (OUTPATIENT)
Age: 70
End: 2025-07-16

## 2025-07-16 VITALS
DIASTOLIC BLOOD PRESSURE: 50 MMHG | WEIGHT: 132 LBS | BODY MASS INDEX: 27.71 KG/M2 | HEART RATE: 67 BPM | HEIGHT: 58 IN | OXYGEN SATURATION: 98 % | SYSTOLIC BLOOD PRESSURE: 116 MMHG

## 2025-07-16 PROCEDURE — 99215 OFFICE O/P EST HI 40 MIN: CPT

## 2025-07-16 PROCEDURE — G2211 COMPLEX E/M VISIT ADD ON: CPT | Mod: NC

## 2025-07-16 PROCEDURE — G0463: CPT

## 2025-07-17 ENCOUNTER — APPOINTMENT (OUTPATIENT)
Dept: INTERNAL MEDICINE | Facility: CLINIC | Age: 70
End: 2025-07-17

## 2025-07-17 DIAGNOSIS — I10 ESSENTIAL (PRIMARY) HYPERTENSION: ICD-10-CM

## 2025-07-21 ENCOUNTER — APPOINTMENT (OUTPATIENT)
Dept: ORTHOPEDIC SURGERY | Facility: CLINIC | Age: 70
End: 2025-07-21
Payer: MEDICARE

## 2025-07-21 ENCOUNTER — NON-APPOINTMENT (OUTPATIENT)
Age: 70
End: 2025-07-21

## 2025-07-21 VITALS — WEIGHT: 132 LBS | BODY MASS INDEX: 26.61 KG/M2 | HEIGHT: 59 IN

## 2025-07-21 DIAGNOSIS — M25.562 PAIN IN LEFT KNEE: ICD-10-CM

## 2025-07-21 PROCEDURE — 99204 OFFICE O/P NEW MOD 45 MIN: CPT

## 2025-07-21 PROCEDURE — 73564 X-RAY EXAM KNEE 4 OR MORE: CPT | Mod: LT

## 2025-07-23 ENCOUNTER — NON-APPOINTMENT (OUTPATIENT)
Age: 70
End: 2025-07-23

## 2025-07-23 ENCOUNTER — APPOINTMENT (OUTPATIENT)
Dept: INTERNAL MEDICINE | Facility: CLINIC | Age: 70
End: 2025-07-23

## 2025-07-23 ENCOUNTER — APPOINTMENT (OUTPATIENT)
Dept: ORTHOPEDIC SURGERY | Facility: CLINIC | Age: 70
End: 2025-07-23
Payer: MEDICAID

## 2025-07-23 VITALS — HEIGHT: 59 IN | WEIGHT: 133 LBS | BODY MASS INDEX: 26.81 KG/M2

## 2025-07-23 DIAGNOSIS — M25.512 PAIN IN LEFT SHOULDER: ICD-10-CM

## 2025-07-23 DIAGNOSIS — M54.2 CERVICALGIA: ICD-10-CM

## 2025-07-23 DIAGNOSIS — M47.812 SPONDYLOSIS W/OUT MYELOPATHY OR RADICULOPATHY, CERVICAL REGION: ICD-10-CM

## 2025-07-23 DIAGNOSIS — M54.12 RADICULOPATHY, CERVICAL REGION: ICD-10-CM

## 2025-07-23 PROCEDURE — 72040 X-RAY EXAM NECK SPINE 2-3 VW: CPT

## 2025-07-23 PROCEDURE — 99214 OFFICE O/P EST MOD 30 MIN: CPT | Mod: 25

## 2025-07-23 PROCEDURE — 73030 X-RAY EXAM OF SHOULDER: CPT | Mod: LT

## 2025-07-31 RX ORDER — DICLOFENAC SODIUM 10 MG/G
1 GEL TOPICAL
Qty: 100 | Refills: 0 | Status: COMPLETED | COMMUNITY
Start: 2025-07-21 | End: 2025-07-31

## 2025-07-31 RX ORDER — IBUPROFEN 600 MG/1
600 TABLET, FILM COATED ORAL
Qty: 60 | Refills: 0 | Status: COMPLETED | COMMUNITY
Start: 2025-07-21 | End: 2025-07-31

## 2025-08-04 DIAGNOSIS — R53.81 OTHER MALAISE: ICD-10-CM

## 2025-08-04 RX ORDER — WALKER
EACH MISCELLANEOUS
Qty: 1 | Refills: 0 | Status: ACTIVE | OUTPATIENT
Start: 2025-08-04

## 2025-08-08 ENCOUNTER — APPOINTMENT (OUTPATIENT)
Dept: OTOLARYNGOLOGY | Facility: CLINIC | Age: 70
End: 2025-08-08

## 2025-08-19 ENCOUNTER — APPOINTMENT (OUTPATIENT)
Dept: CARDIOLOGY | Facility: CLINIC | Age: 70
End: 2025-08-19
Payer: MEDICAID

## 2025-08-19 VITALS
OXYGEN SATURATION: 99 % | HEART RATE: 61 BPM | TEMPERATURE: 98.4 F | SYSTOLIC BLOOD PRESSURE: 110 MMHG | DIASTOLIC BLOOD PRESSURE: 70 MMHG | BODY MASS INDEX: 27.01 KG/M2 | WEIGHT: 134 LBS | HEIGHT: 59 IN

## 2025-08-19 DIAGNOSIS — I10 ESSENTIAL (PRIMARY) HYPERTENSION: ICD-10-CM

## 2025-08-19 DIAGNOSIS — I51.7 CARDIOMEGALY: ICD-10-CM

## 2025-08-19 DIAGNOSIS — R06.09 OTHER FORMS OF DYSPNEA: ICD-10-CM

## 2025-08-19 DIAGNOSIS — E11.9 TYPE 2 DIABETES MELLITUS W/OUT COMPLICATIONS: ICD-10-CM

## 2025-08-19 DIAGNOSIS — I25.10 ATHEROSCLEROTIC HEART DISEASE OF NATIVE CORONARY ARTERY W/OUT ANGINA PECTORIS: ICD-10-CM

## 2025-08-19 DIAGNOSIS — I67.89 OTHER CEREBROVASCULAR DISEASE: ICD-10-CM

## 2025-08-19 DIAGNOSIS — E55.9 VITAMIN D DEFICIENCY, UNSPECIFIED: ICD-10-CM

## 2025-08-19 DIAGNOSIS — Z13.228 ENCOUNTER FOR SCREENING FOR OTHER METABOLIC DISORDERS: ICD-10-CM

## 2025-08-19 DIAGNOSIS — E78.5 HYPERLIPIDEMIA, UNSPECIFIED: ICD-10-CM

## 2025-08-19 DIAGNOSIS — R82.90 UNSPECIFIED ABNORMAL FINDINGS IN URINE: ICD-10-CM

## 2025-08-19 DIAGNOSIS — R94.31 ABNORMAL ELECTROCARDIOGRAM [ECG] [EKG]: ICD-10-CM

## 2025-08-19 PROCEDURE — 99214 OFFICE O/P EST MOD 30 MIN: CPT | Mod: 25

## 2025-08-19 PROCEDURE — 93000 ELECTROCARDIOGRAM COMPLETE: CPT

## 2025-08-21 PROBLEM — Z13.228 SCREENING FOR METABOLIC DISORDER: Status: ACTIVE | Noted: 2025-08-19

## 2025-08-21 LAB
25(OH)D3 SERPL-MCNC: 13.9 NG/ML
ALBUMIN SERPL ELPH-MCNC: 4.2 G/DL
ALBUMIN, RANDOM URINE: 1.8 MG/DL
ALP BLD-CCNC: 98 U/L
ALT SERPL-CCNC: 17 U/L
ANION GAP SERPL CALC-SCNC: 13 MMOL/L
APPEARANCE: CLEAR
AST SERPL-CCNC: 18 U/L
BACTERIA: NEGATIVE /HPF
BILIRUB DIRECT SERPL-MCNC: 0.12 MG/DL
BILIRUB INDIRECT SERPL-MCNC: 0.3 MG/DL
BILIRUB SERPL-MCNC: 0.4 MG/DL
BILIRUBIN URINE: NEGATIVE
BLOOD URINE: ABNORMAL
BUN SERPL-MCNC: 8 MG/DL
CALCIUM SERPL-MCNC: 9.9 MG/DL
CAST: 0 /LPF
CHLORIDE SERPL-SCNC: 102 MMOL/L
CHOLEST SERPL-MCNC: 148 MG/DL
CK SERPL-CCNC: 111 U/L
CO2 SERPL-SCNC: 22 MMOL/L
COLOR: YELLOW
CREAT SERPL-MCNC: 0.6 MG/DL
CREAT SPEC-SCNC: 137 MG/DL
EGFRCR SERPLBLD CKD-EPI 2021: 98 ML/MIN/1.73M2
EPITHELIAL CELLS: 5 /HPF
ESTIMATED AVERAGE GLUCOSE: 203 MG/DL
FRUCTOSAMINE SERPL-MCNC: 292 UMOL/L
GLUCOSE QUALITATIVE U: NEGATIVE MG/DL
GLUCOSE SERPL-MCNC: 157 MG/DL
HBA1C MFR BLD HPLC: 8.7 %
HDLC SERPL-MCNC: 48 MG/DL
KETONES URINE: NEGATIVE MG/DL
LDLC SERPL-MCNC: 87 MG/DL
LEUKOCYTE ESTERASE URINE: ABNORMAL
MAGNESIUM SERPL-MCNC: 2.2 MG/DL
MICROALBUMIN/CREAT 24H UR-RTO: 13 MG/G
MICROSCOPIC-UA: NORMAL
NITRITE URINE: NEGATIVE
NONHDLC SERPL-MCNC: 100 MG/DL
PH URINE: 6
POTASSIUM SERPL-SCNC: 4.7 MMOL/L
PROT SERPL-MCNC: 6.6 G/DL
PROTEIN URINE: NEGATIVE MG/DL
RED BLOOD CELLS URINE: 4 /HPF
SODIUM SERPL-SCNC: 138 MMOL/L
SPECIFIC GRAVITY URINE: 1.01
T4 FREE SERPL-MCNC: 1.2 NG/DL
TRIGL SERPL-MCNC: 64 MG/DL
TSH SERPL-ACNC: 1.88 UIU/ML
UROBILINOGEN URINE: 0.2 MG/DL
WHITE BLOOD CELLS URINE: 39 /HPF

## 2025-08-21 RX ORDER — ERGOCALCIFEROL 1.25 MG/1
1.25 MG CAPSULE ORAL
Qty: 6 | Refills: 0 | Status: ACTIVE | COMMUNITY
Start: 2025-08-21 | End: 1900-01-01

## 2025-08-21 RX ORDER — EZETIMIBE 10 MG/1
10 TABLET ORAL DAILY
Qty: 90 | Refills: 1 | Status: ACTIVE | COMMUNITY
Start: 2025-08-21 | End: 1900-01-01

## 2025-08-27 ENCOUNTER — APPOINTMENT (OUTPATIENT)
Dept: CT IMAGING | Facility: CLINIC | Age: 70
End: 2025-08-27
Payer: MEDICARE

## 2025-08-27 ENCOUNTER — APPOINTMENT (OUTPATIENT)
Dept: RADIOLOGY | Facility: CLINIC | Age: 70
End: 2025-08-27
Payer: MEDICARE

## 2025-08-27 ENCOUNTER — OUTPATIENT (OUTPATIENT)
Dept: OUTPATIENT SERVICES | Facility: HOSPITAL | Age: 70
LOS: 1 days | End: 2025-08-27
Payer: MEDICARE

## 2025-08-27 DIAGNOSIS — Z90.49 ACQUIRED ABSENCE OF OTHER SPECIFIED PARTS OF DIGESTIVE TRACT: Chronic | ICD-10-CM

## 2025-08-27 DIAGNOSIS — Z90.710 ACQUIRED ABSENCE OF BOTH CERVIX AND UTERUS: Chronic | ICD-10-CM

## 2025-08-27 DIAGNOSIS — R06.9 UNSPECIFIED ABNORMALITIES OF BREATHING: ICD-10-CM

## 2025-08-27 PROCEDURE — 71046 X-RAY EXAM CHEST 2 VIEWS: CPT | Mod: 26

## 2025-08-27 PROCEDURE — 75574 CT ANGIO HRT W/3D IMAGE: CPT

## 2025-08-27 PROCEDURE — 75574 CT ANGIO HRT W/3D IMAGE: CPT | Mod: 26

## 2025-08-27 PROCEDURE — 71046 X-RAY EXAM CHEST 2 VIEWS: CPT

## 2025-09-02 ENCOUNTER — OUTPATIENT (OUTPATIENT)
Dept: OUTPATIENT SERVICES | Facility: HOSPITAL | Age: 70
LOS: 1 days | End: 2025-09-02
Payer: MEDICARE

## 2025-09-02 ENCOUNTER — RESULT REVIEW (OUTPATIENT)
Age: 70
End: 2025-09-02

## 2025-09-02 DIAGNOSIS — Z90.710 ACQUIRED ABSENCE OF BOTH CERVIX AND UTERUS: Chronic | ICD-10-CM

## 2025-09-02 DIAGNOSIS — Z00.8 ENCOUNTER FOR OTHER GENERAL EXAMINATION: ICD-10-CM

## 2025-09-02 DIAGNOSIS — Z90.49 ACQUIRED ABSENCE OF OTHER SPECIFIED PARTS OF DIGESTIVE TRACT: Chronic | ICD-10-CM

## 2025-09-02 PROCEDURE — 75580 N-INVAS EST C FFR SW ALY CTA: CPT

## 2025-09-02 PROCEDURE — 75580 N-INVAS EST C FFR SW ALY CTA: CPT | Mod: 26

## 2025-09-04 ENCOUNTER — APPOINTMENT (OUTPATIENT)
Dept: ORTHOPEDIC SURGERY | Facility: CLINIC | Age: 70
End: 2025-09-04

## 2025-09-04 ENCOUNTER — INPATIENT (INPATIENT)
Facility: HOSPITAL | Age: 70
LOS: 0 days | Discharge: HOME | DRG: 303 | End: 2025-09-05
Attending: INTERNAL MEDICINE | Admitting: INTERNAL MEDICINE
Payer: MEDICARE

## 2025-09-04 ENCOUNTER — RESULT REVIEW (OUTPATIENT)
Age: 70
End: 2025-09-04

## 2025-09-04 ENCOUNTER — TRANSCRIPTION ENCOUNTER (OUTPATIENT)
Age: 70
End: 2025-09-04

## 2025-09-04 VITALS
HEART RATE: 64 BPM | RESPIRATION RATE: 16 BRPM | DIASTOLIC BLOOD PRESSURE: 74 MMHG | SYSTOLIC BLOOD PRESSURE: 162 MMHG | TEMPERATURE: 98 F | OXYGEN SATURATION: 99 %

## 2025-09-04 DIAGNOSIS — I25.10 ATHEROSCLEROTIC HEART DISEASE OF NATIVE CORONARY ARTERY WITHOUT ANGINA PECTORIS: ICD-10-CM

## 2025-09-04 DIAGNOSIS — Z90.49 ACQUIRED ABSENCE OF OTHER SPECIFIED PARTS OF DIGESTIVE TRACT: Chronic | ICD-10-CM

## 2025-09-04 DIAGNOSIS — Z90.710 ACQUIRED ABSENCE OF BOTH CERVIX AND UTERUS: Chronic | ICD-10-CM

## 2025-09-04 LAB
ANION GAP SERPL CALC-SCNC: 12 MMOL/L — SIGNIFICANT CHANGE UP (ref 5–17)
BUN SERPL-MCNC: 12 MG/DL — SIGNIFICANT CHANGE UP (ref 7–23)
CALCIUM SERPL-MCNC: 9.5 MG/DL — SIGNIFICANT CHANGE UP (ref 8.4–10.5)
CHLORIDE SERPL-SCNC: 103 MMOL/L — SIGNIFICANT CHANGE UP (ref 96–108)
CO2 SERPL-SCNC: 25 MMOL/L — SIGNIFICANT CHANGE UP (ref 22–31)
CREAT SERPL-MCNC: 0.6 MG/DL — SIGNIFICANT CHANGE UP (ref 0.5–1.3)
EGFR: 98 ML/MIN/1.73M2 — SIGNIFICANT CHANGE UP
EGFR: 98 ML/MIN/1.73M2 — SIGNIFICANT CHANGE UP
GLUCOSE BLDC GLUCOMTR-MCNC: 116 MG/DL — HIGH (ref 70–99)
GLUCOSE BLDC GLUCOMTR-MCNC: 154 MG/DL — HIGH (ref 70–99)
GLUCOSE BLDC GLUCOMTR-MCNC: 254 MG/DL — HIGH (ref 70–99)
GLUCOSE BLDC GLUCOMTR-MCNC: 266 MG/DL — HIGH (ref 70–99)
GLUCOSE SERPL-MCNC: 121 MG/DL — HIGH (ref 70–99)
HCT VFR BLD CALC: 42.8 % — SIGNIFICANT CHANGE UP (ref 34.5–45)
HGB BLD-MCNC: 13.2 G/DL — SIGNIFICANT CHANGE UP (ref 11.5–15.5)
MCHC RBC-ENTMCNC: 26.3 PG — LOW (ref 27–34)
MCHC RBC-ENTMCNC: 30.8 G/DL — LOW (ref 32–36)
MCV RBC AUTO: 85.4 FL — SIGNIFICANT CHANGE UP (ref 80–100)
NRBC # BLD AUTO: 0 K/UL — SIGNIFICANT CHANGE UP (ref 0–0)
NRBC # FLD: 0 K/UL — SIGNIFICANT CHANGE UP (ref 0–0)
NRBC BLD AUTO-RTO: 0 /100 WBCS — SIGNIFICANT CHANGE UP (ref 0–0)
PLATELET # BLD AUTO: 280 K/UL — SIGNIFICANT CHANGE UP (ref 150–400)
PMV BLD: 10.5 FL — SIGNIFICANT CHANGE UP (ref 7–13)
POTASSIUM SERPL-MCNC: 3.7 MMOL/L — SIGNIFICANT CHANGE UP (ref 3.5–5.3)
POTASSIUM SERPL-SCNC: 3.7 MMOL/L — SIGNIFICANT CHANGE UP (ref 3.5–5.3)
RBC # BLD: 5.01 M/UL — SIGNIFICANT CHANGE UP (ref 3.8–5.2)
RBC # FLD: 12.4 % — SIGNIFICANT CHANGE UP (ref 10.3–14.5)
SODIUM SERPL-SCNC: 140 MMOL/L — SIGNIFICANT CHANGE UP (ref 135–145)
WBC # BLD: 7.24 K/UL — SIGNIFICANT CHANGE UP (ref 3.8–10.5)
WBC # FLD AUTO: 7.24 K/UL — SIGNIFICANT CHANGE UP (ref 3.8–10.5)

## 2025-09-04 PROCEDURE — 93010 ELECTROCARDIOGRAM REPORT: CPT | Mod: 77

## 2025-09-04 PROCEDURE — 80048 BASIC METABOLIC PNL TOTAL CA: CPT

## 2025-09-04 PROCEDURE — 92928 PRQ TCAT PLMT NTRAC ST 1 LES: CPT | Mod: LD

## 2025-09-04 PROCEDURE — 36415 COLL VENOUS BLD VENIPUNCTURE: CPT

## 2025-09-04 PROCEDURE — 82962 GLUCOSE BLOOD TEST: CPT

## 2025-09-04 PROCEDURE — 93010 ELECTROCARDIOGRAM REPORT: CPT | Mod: 76

## 2025-09-04 PROCEDURE — 93454 CORONARY ARTERY ANGIO S&I: CPT | Mod: 26,59

## 2025-09-04 PROCEDURE — 93308 TTE F-UP OR LMTD: CPT | Mod: 26

## 2025-09-04 PROCEDURE — 99152 MOD SED SAME PHYS/QHP 5/>YRS: CPT

## 2025-09-04 PROCEDURE — 85027 COMPLETE CBC AUTOMATED: CPT

## 2025-09-04 RX ORDER — SODIUM CHLORIDE 9 G/1000ML
1000 INJECTION, SOLUTION INTRAVENOUS
Refills: 0 | Status: DISCONTINUED | OUTPATIENT
Start: 2025-09-04 | End: 2025-09-05

## 2025-09-04 RX ORDER — NITROGLYCERIN 20 MG/G
1 OINTMENT TOPICAL ONCE
Refills: 0 | Status: COMPLETED | OUTPATIENT
Start: 2025-09-04 | End: 2025-09-04

## 2025-09-04 RX ORDER — INSULIN LISPRO 100 U/ML
16 INJECTION, SOLUTION INTRAVENOUS; SUBCUTANEOUS
Refills: 0 | Status: DISCONTINUED | OUTPATIENT
Start: 2025-09-05 | End: 2025-09-05

## 2025-09-04 RX ORDER — SIMETHICONE 80 MG
1 TABLET,CHEWABLE ORAL
Qty: 0 | Refills: 0 | DISCHARGE
Start: 2025-09-04

## 2025-09-04 RX ORDER — DEXTROSE 50 % IN WATER 50 %
25 SYRINGE (ML) INTRAVENOUS ONCE
Refills: 0 | Status: DISCONTINUED | OUTPATIENT
Start: 2025-09-04 | End: 2025-09-05

## 2025-09-04 RX ORDER — DEXTROSE 50 % IN WATER 50 %
15 SYRINGE (ML) INTRAVENOUS ONCE
Refills: 0 | Status: DISCONTINUED | OUTPATIENT
Start: 2025-09-04 | End: 2025-09-05

## 2025-09-04 RX ORDER — COLCHICINE 0.6 MG/1
0.6 TABLET, FILM COATED ORAL ONCE
Refills: 0 | Status: COMPLETED | OUTPATIENT
Start: 2025-09-04 | End: 2025-09-04

## 2025-09-04 RX ORDER — METOPROLOL SUCCINATE 50 MG/1
25 TABLET, EXTENDED RELEASE ORAL DAILY
Refills: 0 | Status: DISCONTINUED | OUTPATIENT
Start: 2025-09-04 | End: 2025-09-05

## 2025-09-04 RX ORDER — INSULIN GLARGINE-YFGN 100 [IU]/ML
17 INJECTION, SOLUTION SUBCUTANEOUS AT BEDTIME
Refills: 0 | Status: DISCONTINUED | OUTPATIENT
Start: 2025-09-04 | End: 2025-09-05

## 2025-09-04 RX ORDER — INSULIN GLARGINE-YFGN 100 [IU]/ML
22 INJECTION, SOLUTION SUBCUTANEOUS AT BEDTIME
Refills: 0 | Status: DISCONTINUED | OUTPATIENT
Start: 2025-09-04 | End: 2025-09-04

## 2025-09-04 RX ORDER — DEXTROSE 50 % IN WATER 50 %
12.5 SYRINGE (ML) INTRAVENOUS ONCE
Refills: 0 | Status: DISCONTINUED | OUTPATIENT
Start: 2025-09-04 | End: 2025-09-05

## 2025-09-04 RX ORDER — ATORVASTATIN CALCIUM 80 MG/1
40 TABLET, FILM COATED ORAL AT BEDTIME
Refills: 0 | Status: DISCONTINUED | OUTPATIENT
Start: 2025-09-04 | End: 2025-09-05

## 2025-09-04 RX ORDER — ASPIRIN 325 MG
81 TABLET ORAL DAILY
Refills: 0 | Status: DISCONTINUED | OUTPATIENT
Start: 2025-09-05 | End: 2025-09-05

## 2025-09-04 RX ORDER — CLOPIDOGREL BISULFATE 75 MG/1
1 TABLET, FILM COATED ORAL
Qty: 90 | Refills: 3
Start: 2025-09-04 | End: 2026-08-29

## 2025-09-04 RX ORDER — AMLODIPINE BESYLATE 10 MG/1
5 TABLET ORAL DAILY
Refills: 0 | Status: DISCONTINUED | OUTPATIENT
Start: 2025-09-04 | End: 2025-09-05

## 2025-09-04 RX ORDER — COLCHICINE 0.6 MG/1
1 TABLET, FILM COATED ORAL
Qty: 4 | Refills: 0
Start: 2025-09-04 | End: 2025-09-05

## 2025-09-04 RX ORDER — INSULIN LISPRO 100 U/ML
INJECTION, SOLUTION INTRAVENOUS; SUBCUTANEOUS AT BEDTIME
Refills: 0 | Status: DISCONTINUED | OUTPATIENT
Start: 2025-09-04 | End: 2025-09-05

## 2025-09-04 RX ORDER — ASPIRIN 325 MG
81 TABLET ORAL DAILY
Refills: 0 | Status: DISCONTINUED | OUTPATIENT
Start: 2025-09-04 | End: 2025-09-04

## 2025-09-04 RX ORDER — FENTANYL CITRATE-0.9 % NACL/PF 100MCG/2ML
12.5 SYRINGE (ML) INTRAVENOUS ONCE
Refills: 0 | Status: DISCONTINUED | OUTPATIENT
Start: 2025-09-04 | End: 2025-09-04

## 2025-09-04 RX ORDER — MELATONIN 5 MG
5 TABLET ORAL AT BEDTIME
Refills: 0 | Status: DISCONTINUED | OUTPATIENT
Start: 2025-09-04 | End: 2025-09-05

## 2025-09-04 RX ORDER — ONDANSETRON HCL/PF 4 MG/2 ML
4 VIAL (ML) INJECTION ONCE
Refills: 0 | Status: COMPLETED | OUTPATIENT
Start: 2025-09-04 | End: 2025-09-04

## 2025-09-04 RX ORDER — SIMETHICONE 80 MG
80 TABLET,CHEWABLE ORAL EVERY 4 HOURS
Refills: 0 | Status: DISCONTINUED | OUTPATIENT
Start: 2025-09-04 | End: 2025-09-05

## 2025-09-04 RX ORDER — FENTANYL CITRATE-0.9 % NACL/PF 100MCG/2ML
25 SYRINGE (ML) INTRAVENOUS ONCE
Refills: 0 | Status: DISCONTINUED | OUTPATIENT
Start: 2025-09-04 | End: 2025-09-04

## 2025-09-04 RX ORDER — DILTIAZEM HYDROCHLORIDE 240 MG/1
120 TABLET, EXTENDED RELEASE ORAL DAILY
Refills: 0 | Status: DISCONTINUED | OUTPATIENT
Start: 2025-09-04 | End: 2025-09-05

## 2025-09-04 RX ORDER — GLUCAGON 3 MG/1
1 POWDER NASAL ONCE
Refills: 0 | Status: DISCONTINUED | OUTPATIENT
Start: 2025-09-04 | End: 2025-09-05

## 2025-09-04 RX ORDER — LOSARTAN POTASSIUM 100 MG/1
50 TABLET, FILM COATED ORAL DAILY
Refills: 0 | Status: DISCONTINUED | OUTPATIENT
Start: 2025-09-04 | End: 2025-09-05

## 2025-09-04 RX ORDER — CLOPIDOGREL BISULFATE 75 MG/1
75 TABLET, FILM COATED ORAL DAILY
Refills: 0 | Status: DISCONTINUED | OUTPATIENT
Start: 2025-09-05 | End: 2025-09-05

## 2025-09-04 RX ORDER — LOSARTAN POTASSIUM 100 MG/1
1 TABLET, FILM COATED ORAL
Refills: 0 | DISCHARGE

## 2025-09-04 RX ORDER — COLCHICINE 0.6 MG/1
0.6 TABLET, FILM COATED ORAL
Refills: 0 | Status: DISCONTINUED | OUTPATIENT
Start: 2025-09-04 | End: 2025-09-05

## 2025-09-04 RX ORDER — INSULIN LISPRO 100 U/ML
INJECTION, SOLUTION INTRAVENOUS; SUBCUTANEOUS
Refills: 0 | Status: DISCONTINUED | OUTPATIENT
Start: 2025-09-04 | End: 2025-09-05

## 2025-09-04 RX ADMIN — COLCHICINE 0.6 MILLIGRAM(S): 0.6 TABLET, FILM COATED ORAL at 19:38

## 2025-09-04 RX ADMIN — INSULIN LISPRO 1: 100 INJECTION, SOLUTION INTRAVENOUS; SUBCUTANEOUS at 20:53

## 2025-09-04 RX ADMIN — INSULIN GLARGINE-YFGN 17 UNIT(S): 100 INJECTION, SOLUTION SUBCUTANEOUS at 20:56

## 2025-09-04 RX ADMIN — NITROGLYCERIN 1 INCH(S): 20 OINTMENT TOPICAL at 22:11

## 2025-09-04 RX ADMIN — Medication 75 MILLILITER(S): at 10:06

## 2025-09-04 RX ADMIN — ATORVASTATIN CALCIUM 40 MILLIGRAM(S): 80 TABLET, FILM COATED ORAL at 19:39

## 2025-09-04 RX ADMIN — Medication 25 MICROGRAM(S): at 11:15

## 2025-09-04 RX ADMIN — Medication 12.5 MICROGRAM(S): at 13:00

## 2025-09-04 RX ADMIN — LOSARTAN POTASSIUM 50 MILLIGRAM(S): 100 TABLET, FILM COATED ORAL at 17:43

## 2025-09-04 RX ADMIN — Medication 5 MILLIGRAM(S): at 20:56

## 2025-09-04 RX ADMIN — Medication 500 MILLILITER(S): at 07:41

## 2025-09-04 RX ADMIN — Medication 75 MILLILITER(S): at 07:42

## 2025-09-04 RX ADMIN — DILTIAZEM HYDROCHLORIDE 120 MILLIGRAM(S): 240 TABLET, EXTENDED RELEASE ORAL at 14:11

## 2025-09-04 RX ADMIN — AMLODIPINE BESYLATE 5 MILLIGRAM(S): 10 TABLET ORAL at 17:42

## 2025-09-04 RX ADMIN — METOPROLOL SUCCINATE 25 MILLIGRAM(S): 50 TABLET, EXTENDED RELEASE ORAL at 17:42

## 2025-09-04 RX ADMIN — Medication 12.5 MICROGRAM(S): at 12:17

## 2025-09-04 RX ADMIN — NITROGLYCERIN 1 INCH(S): 20 OINTMENT TOPICAL at 13:10

## 2025-09-04 RX ADMIN — Medication 25 MICROGRAM(S): at 20:55

## 2025-09-04 RX ADMIN — Medication 25 MICROGRAM(S): at 21:20

## 2025-09-04 RX ADMIN — INSULIN LISPRO 3: 100 INJECTION, SOLUTION INTRAVENOUS; SUBCUTANEOUS at 17:42

## 2025-09-04 RX ADMIN — NITROGLYCERIN 1 INCH(S): 20 OINTMENT TOPICAL at 13:30

## 2025-09-04 RX ADMIN — Medication 25 MICROGRAM(S): at 10:49

## 2025-09-04 RX ADMIN — Medication 4 MILLIGRAM(S): at 23:02

## 2025-09-04 RX ADMIN — COLCHICINE 0.6 MILLIGRAM(S): 0.6 TABLET, FILM COATED ORAL at 13:55

## 2025-09-05 ENCOUNTER — TRANSCRIPTION ENCOUNTER (OUTPATIENT)
Age: 70
End: 2025-09-05

## 2025-09-05 VITALS
RESPIRATION RATE: 18 BRPM | SYSTOLIC BLOOD PRESSURE: 143 MMHG | TEMPERATURE: 99 F | HEART RATE: 66 BPM | OXYGEN SATURATION: 100 % | DIASTOLIC BLOOD PRESSURE: 65 MMHG

## 2025-09-05 DIAGNOSIS — E78.5 HYPERLIPIDEMIA, UNSPECIFIED: ICD-10-CM

## 2025-09-05 DIAGNOSIS — I10 ESSENTIAL (PRIMARY) HYPERTENSION: ICD-10-CM

## 2025-09-05 DIAGNOSIS — I25.10 ATHEROSCLEROTIC HEART DISEASE OF NATIVE CORONARY ARTERY WITHOUT ANGINA PECTORIS: ICD-10-CM

## 2025-09-05 LAB
A1C WITH ESTIMATED AVERAGE GLUCOSE RESULT: 8.6 % — HIGH (ref 4–5.6)
CHOLEST SERPL-MCNC: 117 MG/DL — SIGNIFICANT CHANGE UP
ESTIMATED AVERAGE GLUCOSE: 200 MG/DL — HIGH (ref 68–114)
GLUCOSE BLDC GLUCOMTR-MCNC: 168 MG/DL — HIGH (ref 70–99)
GLUCOSE BLDC GLUCOMTR-MCNC: 201 MG/DL — HIGH (ref 70–99)
GLUCOSE BLDC GLUCOMTR-MCNC: 290 MG/DL — HIGH (ref 70–99)
HDLC SERPL-MCNC: 48 MG/DL — LOW
LDLC SERPL-MCNC: 55 MG/DL — SIGNIFICANT CHANGE UP
LIPID PNL WITH DIRECT LDL SERPL: 55 MG/DL — SIGNIFICANT CHANGE UP
NONHDLC SERPL-MCNC: 69 MG/DL — SIGNIFICANT CHANGE UP
TRIGL SERPL-MCNC: 63 MG/DL — SIGNIFICANT CHANGE UP

## 2025-09-05 PROCEDURE — 82962 GLUCOSE BLOOD TEST: CPT

## 2025-09-05 PROCEDURE — 97161 PT EVAL LOW COMPLEX 20 MIN: CPT

## 2025-09-05 PROCEDURE — 83036 HEMOGLOBIN GLYCOSYLATED A1C: CPT

## 2025-09-05 PROCEDURE — 93308 TTE F-UP OR LMTD: CPT

## 2025-09-05 PROCEDURE — 36415 COLL VENOUS BLD VENIPUNCTURE: CPT

## 2025-09-05 PROCEDURE — 99232 SBSQ HOSP IP/OBS MODERATE 35: CPT

## 2025-09-05 PROCEDURE — 80061 LIPID PANEL: CPT

## 2025-09-05 PROCEDURE — 80048 BASIC METABOLIC PNL TOTAL CA: CPT

## 2025-09-05 PROCEDURE — 85027 COMPLETE CBC AUTOMATED: CPT

## 2025-09-05 RX ORDER — COLCHICINE 0.6 MG/1
1 TABLET, FILM COATED ORAL
Qty: 14 | Refills: 0
Start: 2025-09-05 | End: 2025-09-11

## 2025-09-05 RX ORDER — DILTIAZEM HYDROCHLORIDE 240 MG/1
1 TABLET, EXTENDED RELEASE ORAL
Qty: 30 | Refills: 1
Start: 2025-09-05 | End: 2025-11-03

## 2025-09-05 RX ORDER — ACETAMINOPHEN 500 MG/5ML
1000 LIQUID (ML) ORAL ONCE
Refills: 0 | Status: COMPLETED | OUTPATIENT
Start: 2025-09-05 | End: 2025-09-05

## 2025-09-05 RX ORDER — MAGNESIUM, ALUMINUM HYDROXIDE 200-200 MG
30 TABLET,CHEWABLE ORAL ONCE
Refills: 0 | Status: COMPLETED | OUTPATIENT
Start: 2025-09-05 | End: 2025-09-05

## 2025-09-05 RX ORDER — ISOSORBDIE DINITRATE 30 MG/1
10 TABLET ORAL ONCE
Refills: 0 | Status: COMPLETED | OUTPATIENT
Start: 2025-09-05 | End: 2025-09-05

## 2025-09-05 RX ORDER — ISOSORBIDE MONONITRATE 60 MG/1
30 TABLET, EXTENDED RELEASE ORAL DAILY
Refills: 0 | Status: DISCONTINUED | OUTPATIENT
Start: 2025-09-05 | End: 2025-09-05

## 2025-09-05 RX ORDER — FENTANYL CITRATE-0.9 % NACL/PF 100MCG/2ML
12.5 SYRINGE (ML) INTRAVENOUS ONCE
Refills: 0 | Status: DISCONTINUED | OUTPATIENT
Start: 2025-09-05 | End: 2025-09-05

## 2025-09-05 RX ORDER — ZALEPLON 10 MG/1
5 CAPSULE ORAL AT BEDTIME
Refills: 0 | Status: DISCONTINUED | OUTPATIENT
Start: 2025-09-05 | End: 2025-09-05

## 2025-09-05 RX ORDER — INFLUENZA A VIRUS A/IDAHO/07/2018 (H1N1) ANTIGEN (MDCK CELL DERIVED, PROPIOLACTONE INACTIVATED, INFLUENZA A VIRUS A/INDIANA/08/2018 (H3N2) ANTIGEN (MDCK CELL DERIVED, PROPIOLACTONE INACTIVATED), INFLUENZA B VIRUS B/SINGAPORE/INFTT-16-0610/2016 ANTIGEN (MDCK CELL DERIVED, PROPIOLACTONE INACTIVATED), INFLUENZA B VIRUS B/IOWA/06/2017 ANTIGEN (MDCK CELL DERIVED, PROPIOLACTONE INACTIVATED) 15; 15; 15; 15 UG/.5ML; UG/.5ML; UG/.5ML; UG/.5ML
0.5 INJECTION, SUSPENSION INTRAMUSCULAR ONCE
Refills: 0 | Status: DISCONTINUED | OUTPATIENT
Start: 2025-09-05 | End: 2025-09-05

## 2025-09-05 RX ORDER — METOPROLOL SUCCINATE 50 MG/1
1 TABLET, EXTENDED RELEASE ORAL
Refills: 0 | DISCHARGE

## 2025-09-05 RX ADMIN — Medication 1000 MILLIGRAM(S): at 02:20

## 2025-09-05 RX ADMIN — COLCHICINE 0.6 MILLIGRAM(S): 0.6 TABLET, FILM COATED ORAL at 17:17

## 2025-09-05 RX ADMIN — INSULIN LISPRO 16 UNIT(S): 100 INJECTION, SOLUTION INTRAVENOUS; SUBCUTANEOUS at 16:38

## 2025-09-05 RX ADMIN — Medication 30 MILLILITER(S): at 09:00

## 2025-09-05 RX ADMIN — CLOPIDOGREL BISULFATE 75 MILLIGRAM(S): 75 TABLET, FILM COATED ORAL at 06:13

## 2025-09-05 RX ADMIN — ISOSORBDIE DINITRATE 10 MILLIGRAM(S): 30 TABLET ORAL at 06:13

## 2025-09-05 RX ADMIN — DILTIAZEM HYDROCHLORIDE 120 MILLIGRAM(S): 240 TABLET, EXTENDED RELEASE ORAL at 08:06

## 2025-09-05 RX ADMIN — INSULIN LISPRO 2: 100 INJECTION, SOLUTION INTRAVENOUS; SUBCUTANEOUS at 08:05

## 2025-09-05 RX ADMIN — INSULIN LISPRO 16 UNIT(S): 100 INJECTION, SOLUTION INTRAVENOUS; SUBCUTANEOUS at 11:33

## 2025-09-05 RX ADMIN — METOPROLOL SUCCINATE 25 MILLIGRAM(S): 50 TABLET, EXTENDED RELEASE ORAL at 08:06

## 2025-09-05 RX ADMIN — INSULIN LISPRO 1: 100 INJECTION, SOLUTION INTRAVENOUS; SUBCUTANEOUS at 16:39

## 2025-09-05 RX ADMIN — Medication 81 MILLIGRAM(S): at 06:13

## 2025-09-05 RX ADMIN — NITROGLYCERIN 1 INCH(S): 20 OINTMENT TOPICAL at 10:00

## 2025-09-05 RX ADMIN — Medication 400 MILLIGRAM(S): at 01:48

## 2025-09-05 RX ADMIN — Medication 80 MILLIGRAM(S): at 06:13

## 2025-09-05 RX ADMIN — AMLODIPINE BESYLATE 5 MILLIGRAM(S): 10 TABLET ORAL at 08:06

## 2025-09-05 RX ADMIN — COLCHICINE 0.6 MILLIGRAM(S): 0.6 TABLET, FILM COATED ORAL at 06:13

## 2025-09-05 RX ADMIN — LOSARTAN POTASSIUM 50 MILLIGRAM(S): 100 TABLET, FILM COATED ORAL at 08:06

## 2025-09-05 RX ADMIN — INSULIN LISPRO 3: 100 INJECTION, SOLUTION INTRAVENOUS; SUBCUTANEOUS at 11:33

## 2025-09-05 RX ADMIN — ZALEPLON 5 MILLIGRAM(S): 10 CAPSULE ORAL at 01:01

## 2025-09-05 RX ADMIN — INSULIN LISPRO 16 UNIT(S): 100 INJECTION, SOLUTION INTRAVENOUS; SUBCUTANEOUS at 08:05

## 2025-09-06 ENCOUNTER — APPOINTMENT (OUTPATIENT)
Dept: CARDIOLOGY | Facility: CLINIC | Age: 70
End: 2025-09-06

## 2025-09-19 ENCOUNTER — APPOINTMENT (OUTPATIENT)
Age: 70
End: 2025-09-19

## 2025-09-22 PROBLEM — I31.39 PERICARDIAL EFFUSION: Status: ACTIVE | Noted: 2025-09-22

## 2025-09-22 PROBLEM — R00.2 PALPITATIONS: Status: ACTIVE | Noted: 2025-09-22
